# Patient Record
Sex: FEMALE | Race: WHITE | NOT HISPANIC OR LATINO | Employment: OTHER | URBAN - METROPOLITAN AREA
[De-identification: names, ages, dates, MRNs, and addresses within clinical notes are randomized per-mention and may not be internally consistent; named-entity substitution may affect disease eponyms.]

---

## 2017-02-20 ENCOUNTER — LAB (OUTPATIENT)
Dept: LAB | Facility: CLINIC | Age: 82
End: 2017-02-20
Payer: MEDICARE

## 2017-02-20 ENCOUNTER — TRANSCRIBE ORDERS (OUTPATIENT)
Dept: LAB | Facility: CLINIC | Age: 82
End: 2017-02-20

## 2017-02-20 DIAGNOSIS — I48.91 ATRIAL FIBRILLATION, UNSPECIFIED TYPE (HCC): Primary | ICD-10-CM

## 2017-02-20 DIAGNOSIS — I25.110 ATHEROSCLEROSIS OF NATIVE CORONARY ARTERY WITH UNSTABLE ANGINA PECTORIS, UNSPECIFIED WHETHER NATIVE OR TRANSPLANTED HEART (HCC): ICD-10-CM

## 2017-02-20 DIAGNOSIS — I63.9 IMPENDING CEREBROVASCULAR ACCIDENT (HCC): ICD-10-CM

## 2017-02-20 DIAGNOSIS — I50.9 HEART FAILURE, UNSPECIFIED (HCC): ICD-10-CM

## 2017-02-20 DIAGNOSIS — J45.40 ASTHMATIC BRONCHITIS, MODERATE PERSISTENT, UNCOMPLICATED: ICD-10-CM

## 2017-02-20 DIAGNOSIS — I48.91 ATRIAL FIBRILLATION, UNSPECIFIED TYPE (HCC): ICD-10-CM

## 2017-02-20 LAB
ALBUMIN SERPL BCP-MCNC: 3.8 G/DL (ref 3.5–5)
ALP SERPL-CCNC: 116 U/L (ref 46–116)
ALT SERPL W P-5'-P-CCNC: 47 U/L (ref 12–78)
ANION GAP SERPL CALCULATED.3IONS-SCNC: 6 MMOL/L (ref 4–13)
AST SERPL W P-5'-P-CCNC: 41 U/L (ref 5–45)
BASOPHILS # BLD AUTO: 0.03 THOUSANDS/ΜL (ref 0–0.1)
BASOPHILS NFR BLD AUTO: 1 % (ref 0–1)
BILIRUB SERPL-MCNC: 0.9 MG/DL (ref 0.2–1)
BUN SERPL-MCNC: 38 MG/DL (ref 5–25)
CALCIUM SERPL-MCNC: 9.2 MG/DL (ref 8.3–10.1)
CHLORIDE SERPL-SCNC: 105 MMOL/L (ref 100–108)
CO2 SERPL-SCNC: 34 MMOL/L (ref 21–32)
CREAT SERPL-MCNC: 1.44 MG/DL (ref 0.6–1.3)
DIGOXIN SERPL-MCNC: 0.9 NG/ML (ref 0.8–2)
EOSINOPHIL # BLD AUTO: 0.2 THOUSAND/ΜL (ref 0–0.61)
EOSINOPHIL NFR BLD AUTO: 3 % (ref 0–6)
ERYTHROCYTE [DISTWIDTH] IN BLOOD BY AUTOMATED COUNT: 14.9 % (ref 11.6–15.1)
ERYTHROCYTE [SEDIMENTATION RATE] IN BLOOD: 10 MM/HOUR (ref 0–20)
GFR SERPL CREATININE-BSD FRML MDRD: 34.3 ML/MIN/1.73SQ M
GLUCOSE SERPL-MCNC: 89 MG/DL (ref 65–140)
HCT VFR BLD AUTO: 44.1 % (ref 34.8–46.1)
HGB BLD-MCNC: 13.8 G/DL (ref 11.5–15.4)
LDLC SERPL DIRECT ASSAY-MCNC: 81 MG/DL (ref 0–100)
LYMPHOCYTES # BLD AUTO: 2.27 THOUSANDS/ΜL (ref 0.6–4.47)
LYMPHOCYTES NFR BLD AUTO: 35 % (ref 14–44)
MCH RBC QN AUTO: 30.6 PG (ref 26.8–34.3)
MCHC RBC AUTO-ENTMCNC: 31.3 G/DL (ref 31.4–37.4)
MCV RBC AUTO: 98 FL (ref 82–98)
MONOCYTES # BLD AUTO: 0.88 THOUSAND/ΜL (ref 0.17–1.22)
MONOCYTES NFR BLD AUTO: 14 % (ref 4–12)
NEUTROPHILS # BLD AUTO: 3.05 THOUSANDS/ΜL (ref 1.85–7.62)
NEUTS SEG NFR BLD AUTO: 47 % (ref 43–75)
NT-PROBNP SERPL-MCNC: 2516 PG/ML
PLATELET # BLD AUTO: 251 THOUSANDS/UL (ref 149–390)
PMV BLD AUTO: 10.7 FL (ref 8.9–12.7)
POTASSIUM SERPL-SCNC: 3.7 MMOL/L (ref 3.5–5.3)
PROT SERPL-MCNC: 7.5 G/DL (ref 6.4–8.2)
RBC # BLD AUTO: 4.51 MILLION/UL (ref 3.81–5.12)
SODIUM SERPL-SCNC: 145 MMOL/L (ref 136–145)
WBC # BLD AUTO: 6.43 THOUSAND/UL (ref 4.31–10.16)

## 2017-02-20 PROCEDURE — 83721 ASSAY OF BLOOD LIPOPROTEIN: CPT

## 2017-02-20 PROCEDURE — 80162 ASSAY OF DIGOXIN TOTAL: CPT

## 2017-02-20 PROCEDURE — 85652 RBC SED RATE AUTOMATED: CPT

## 2017-02-20 PROCEDURE — 36415 COLL VENOUS BLD VENIPUNCTURE: CPT

## 2017-02-20 PROCEDURE — 80053 COMPREHEN METABOLIC PANEL: CPT

## 2017-02-20 PROCEDURE — 83880 ASSAY OF NATRIURETIC PEPTIDE: CPT

## 2017-02-20 PROCEDURE — 85025 COMPLETE CBC W/AUTO DIFF WBC: CPT

## 2017-03-02 ENCOUNTER — APPOINTMENT (OUTPATIENT)
Dept: LAB | Facility: CLINIC | Age: 82
End: 2017-03-02
Payer: MEDICARE

## 2017-03-02 DIAGNOSIS — J45.40 ASTHMATIC BRONCHITIS, MODERATE PERSISTENT, UNCOMPLICATED: ICD-10-CM

## 2017-03-02 DIAGNOSIS — I63.9 IMPENDING CEREBROVASCULAR ACCIDENT (HCC): ICD-10-CM

## 2017-03-02 DIAGNOSIS — I50.9 HEART FAILURE, UNSPECIFIED (HCC): ICD-10-CM

## 2017-03-02 DIAGNOSIS — I48.91 ATRIAL FIBRILLATION, UNSPECIFIED TYPE (HCC): ICD-10-CM

## 2017-03-02 DIAGNOSIS — I25.110 ATHEROSCLEROSIS OF NATIVE CORONARY ARTERY WITH UNSTABLE ANGINA PECTORIS, UNSPECIFIED WHETHER NATIVE OR TRANSPLANTED HEART (HCC): ICD-10-CM

## 2017-03-02 LAB
ANION GAP SERPL CALCULATED.3IONS-SCNC: 7 MMOL/L (ref 4–13)
BUN SERPL-MCNC: 34 MG/DL (ref 5–25)
CALCIUM SERPL-MCNC: 9.1 MG/DL (ref 8.3–10.1)
CHLORIDE SERPL-SCNC: 104 MMOL/L (ref 100–108)
CO2 SERPL-SCNC: 33 MMOL/L (ref 21–32)
CREAT SERPL-MCNC: 1.23 MG/DL (ref 0.6–1.3)
GFR SERPL CREATININE-BSD FRML MDRD: 41.1 ML/MIN/1.73SQ M
GLUCOSE SERPL-MCNC: 82 MG/DL (ref 65–140)
NT-PROBNP SERPL-MCNC: 1842 PG/ML
POTASSIUM SERPL-SCNC: 3.7 MMOL/L (ref 3.5–5.3)
SODIUM SERPL-SCNC: 144 MMOL/L (ref 136–145)

## 2017-03-02 PROCEDURE — 36415 COLL VENOUS BLD VENIPUNCTURE: CPT

## 2017-03-02 PROCEDURE — 80048 BASIC METABOLIC PNL TOTAL CA: CPT

## 2017-03-02 PROCEDURE — 83880 ASSAY OF NATRIURETIC PEPTIDE: CPT

## 2017-03-10 ENCOUNTER — HOSPITAL ENCOUNTER (OUTPATIENT)
Dept: NON INVASIVE DIAGNOSTICS | Facility: CLINIC | Age: 82
Discharge: HOME | End: 2017-03-10
Payer: MEDICARE

## 2017-03-10 DIAGNOSIS — I48.91 ATRIAL FIBRILLATION (HCC): ICD-10-CM

## 2017-03-10 DIAGNOSIS — I10 ESSENTIAL (PRIMARY) HYPERTENSION: ICD-10-CM

## 2017-03-10 DIAGNOSIS — I35.1 NONRHEUMATIC AORTIC VALVE INSUFFICIENCY: ICD-10-CM

## 2017-03-10 PROCEDURE — 93306 TTE W/DOPPLER COMPLETE: CPT

## 2017-03-28 ENCOUNTER — LAB REQUISITION (OUTPATIENT)
Dept: LAB | Facility: HOSPITAL | Age: 82
End: 2017-03-28
Payer: MEDICARE

## 2017-03-28 DIAGNOSIS — I48.20 CHRONIC ATRIAL FIBRILLATION (HCC): ICD-10-CM

## 2017-03-28 DIAGNOSIS — I50.9 HEART FAILURE (HCC): ICD-10-CM

## 2017-03-28 LAB
ANION GAP SERPL CALCULATED.3IONS-SCNC: 9 MMOL/L (ref 4–13)
BUN SERPL-MCNC: 34 MG/DL (ref 5–25)
CALCIUM SERPL-MCNC: 9.8 MG/DL (ref 8.3–10.1)
CHLORIDE SERPL-SCNC: 103 MMOL/L (ref 100–108)
CO2 SERPL-SCNC: 31 MMOL/L (ref 21–32)
CREAT SERPL-MCNC: 1.15 MG/DL (ref 0.6–1.3)
GFR SERPL CREATININE-BSD FRML MDRD: 44.4 ML/MIN/1.73SQ M
GLUCOSE P FAST SERPL-MCNC: 97 MG/DL (ref 65–99)
MAGNESIUM SERPL-MCNC: 2.2 MG/DL (ref 1.6–2.6)
POTASSIUM SERPL-SCNC: 4.3 MMOL/L (ref 3.5–5.3)
SODIUM SERPL-SCNC: 143 MMOL/L (ref 136–145)

## 2017-03-28 PROCEDURE — 80048 BASIC METABOLIC PNL TOTAL CA: CPT | Performed by: INTERNAL MEDICINE

## 2017-03-28 PROCEDURE — 83735 ASSAY OF MAGNESIUM: CPT | Performed by: INTERNAL MEDICINE

## 2017-05-05 ENCOUNTER — ALLSCRIPTS OFFICE VISIT (OUTPATIENT)
Dept: OTHER | Facility: OTHER | Age: 82
End: 2017-05-05

## 2017-07-10 DIAGNOSIS — R26.89 OTHER ABNORMALITIES OF GAIT AND MOBILITY: ICD-10-CM

## 2017-07-20 ENCOUNTER — GENERIC CONVERSION - ENCOUNTER (OUTPATIENT)
Dept: OTHER | Facility: OTHER | Age: 82
End: 2017-07-20

## 2017-07-20 ENCOUNTER — APPOINTMENT (OUTPATIENT)
Dept: PHYSICAL THERAPY | Facility: CLINIC | Age: 82
End: 2017-07-20
Payer: MEDICARE

## 2017-07-20 DIAGNOSIS — R26.89 OTHER ABNORMALITIES OF GAIT AND MOBILITY: ICD-10-CM

## 2017-07-20 PROCEDURE — 97110 THERAPEUTIC EXERCISES: CPT

## 2017-07-20 PROCEDURE — G8991 OTHER PT/OT GOAL STATUS: HCPCS

## 2017-07-20 PROCEDURE — 97162 PT EVAL MOD COMPLEX 30 MIN: CPT

## 2017-07-20 PROCEDURE — G8990 OTHER PT/OT CURRENT STATUS: HCPCS

## 2017-07-25 ENCOUNTER — APPOINTMENT (OUTPATIENT)
Dept: PHYSICAL THERAPY | Facility: CLINIC | Age: 82
End: 2017-07-25
Payer: MEDICARE

## 2017-07-25 PROCEDURE — 97110 THERAPEUTIC EXERCISES: CPT

## 2017-07-25 PROCEDURE — 97112 NEUROMUSCULAR REEDUCATION: CPT

## 2017-08-01 ENCOUNTER — APPOINTMENT (OUTPATIENT)
Dept: PHYSICAL THERAPY | Facility: CLINIC | Age: 82
End: 2017-08-01
Payer: MEDICARE

## 2017-08-01 PROCEDURE — 97112 NEUROMUSCULAR REEDUCATION: CPT

## 2017-08-01 PROCEDURE — 97110 THERAPEUTIC EXERCISES: CPT

## 2017-08-04 ENCOUNTER — APPOINTMENT (OUTPATIENT)
Dept: PHYSICAL THERAPY | Facility: CLINIC | Age: 82
End: 2017-08-04
Payer: MEDICARE

## 2017-08-04 PROCEDURE — 97112 NEUROMUSCULAR REEDUCATION: CPT

## 2017-08-04 PROCEDURE — 97530 THERAPEUTIC ACTIVITIES: CPT

## 2017-08-08 ENCOUNTER — APPOINTMENT (OUTPATIENT)
Dept: PHYSICAL THERAPY | Facility: CLINIC | Age: 82
End: 2017-08-08
Payer: MEDICARE

## 2017-08-08 PROCEDURE — 97112 NEUROMUSCULAR REEDUCATION: CPT

## 2017-08-08 PROCEDURE — 97110 THERAPEUTIC EXERCISES: CPT

## 2017-08-10 ENCOUNTER — APPOINTMENT (OUTPATIENT)
Dept: PHYSICAL THERAPY | Facility: CLINIC | Age: 82
End: 2017-08-10
Payer: MEDICARE

## 2017-08-10 PROCEDURE — 97110 THERAPEUTIC EXERCISES: CPT

## 2017-08-10 PROCEDURE — 97112 NEUROMUSCULAR REEDUCATION: CPT

## 2017-08-11 ENCOUNTER — APPOINTMENT (OUTPATIENT)
Dept: PHYSICAL THERAPY | Facility: CLINIC | Age: 82
End: 2017-08-11
Payer: MEDICARE

## 2017-08-14 ENCOUNTER — APPOINTMENT (OUTPATIENT)
Dept: PHYSICAL THERAPY | Facility: CLINIC | Age: 82
End: 2017-08-14
Payer: MEDICARE

## 2017-08-17 ENCOUNTER — APPOINTMENT (OUTPATIENT)
Dept: PHYSICAL THERAPY | Facility: CLINIC | Age: 82
End: 2017-08-17
Payer: MEDICARE

## 2017-08-21 ENCOUNTER — APPOINTMENT (OUTPATIENT)
Dept: PHYSICAL THERAPY | Facility: CLINIC | Age: 82
End: 2017-08-21
Payer: MEDICARE

## 2017-08-24 ENCOUNTER — APPOINTMENT (OUTPATIENT)
Dept: PHYSICAL THERAPY | Facility: CLINIC | Age: 82
End: 2017-08-24
Payer: MEDICARE

## 2017-08-28 ENCOUNTER — APPOINTMENT (OUTPATIENT)
Dept: PHYSICAL THERAPY | Facility: CLINIC | Age: 82
End: 2017-08-28
Payer: MEDICARE

## 2017-08-28 ENCOUNTER — GENERIC CONVERSION - ENCOUNTER (OUTPATIENT)
Dept: OTHER | Facility: OTHER | Age: 82
End: 2017-08-28

## 2017-08-28 PROCEDURE — G8991 OTHER PT/OT GOAL STATUS: HCPCS

## 2017-08-28 PROCEDURE — 97112 NEUROMUSCULAR REEDUCATION: CPT

## 2017-08-28 PROCEDURE — 97110 THERAPEUTIC EXERCISES: CPT

## 2017-08-28 PROCEDURE — G8990 OTHER PT/OT CURRENT STATUS: HCPCS

## 2017-09-05 ENCOUNTER — APPOINTMENT (OUTPATIENT)
Dept: PHYSICAL THERAPY | Facility: CLINIC | Age: 82
End: 2017-09-05
Payer: MEDICARE

## 2017-09-07 ENCOUNTER — APPOINTMENT (OUTPATIENT)
Dept: PHYSICAL THERAPY | Facility: CLINIC | Age: 82
End: 2017-09-07
Payer: MEDICARE

## 2017-09-11 ENCOUNTER — APPOINTMENT (OUTPATIENT)
Dept: PHYSICAL THERAPY | Facility: CLINIC | Age: 82
End: 2017-09-11
Payer: MEDICARE

## 2017-09-14 ENCOUNTER — APPOINTMENT (OUTPATIENT)
Dept: PHYSICAL THERAPY | Facility: CLINIC | Age: 82
End: 2017-09-14
Payer: MEDICARE

## 2017-09-18 ENCOUNTER — APPOINTMENT (OUTPATIENT)
Dept: PHYSICAL THERAPY | Facility: CLINIC | Age: 82
End: 2017-09-18
Payer: MEDICARE

## 2017-09-21 ENCOUNTER — APPOINTMENT (OUTPATIENT)
Dept: PHYSICAL THERAPY | Facility: CLINIC | Age: 82
End: 2017-09-21
Payer: MEDICARE

## 2017-09-25 ENCOUNTER — APPOINTMENT (OUTPATIENT)
Dept: PHYSICAL THERAPY | Facility: CLINIC | Age: 82
End: 2017-09-25
Payer: MEDICARE

## 2017-09-25 PROCEDURE — G8990 OTHER PT/OT CURRENT STATUS: HCPCS

## 2017-09-25 PROCEDURE — 97110 THERAPEUTIC EXERCISES: CPT

## 2017-09-25 PROCEDURE — G8991 OTHER PT/OT GOAL STATUS: HCPCS

## 2017-09-28 ENCOUNTER — APPOINTMENT (OUTPATIENT)
Dept: PHYSICAL THERAPY | Facility: CLINIC | Age: 82
End: 2017-09-28
Payer: MEDICARE

## 2017-10-03 ENCOUNTER — LAB REQUISITION (OUTPATIENT)
Dept: LAB | Facility: HOSPITAL | Age: 82
End: 2017-10-03
Payer: MEDICARE

## 2017-10-03 DIAGNOSIS — I48.20 CHRONIC ATRIAL FIBRILLATION (HCC): ICD-10-CM

## 2017-10-03 DIAGNOSIS — I50.9 HEART FAILURE (HCC): ICD-10-CM

## 2017-10-03 DIAGNOSIS — R53.83 OTHER FATIGUE: ICD-10-CM

## 2017-10-03 LAB
25(OH)D3 SERPL-MCNC: 48.2 NG/ML (ref 30–100)
ALBUMIN SERPL BCP-MCNC: 3.8 G/DL (ref 3.5–5)
ALP SERPL-CCNC: 110 U/L (ref 46–116)
ALT SERPL W P-5'-P-CCNC: 33 U/L (ref 12–78)
ANION GAP SERPL CALCULATED.3IONS-SCNC: 8 MMOL/L (ref 4–13)
AST SERPL W P-5'-P-CCNC: 38 U/L (ref 5–45)
BILIRUB SERPL-MCNC: 0.51 MG/DL (ref 0.2–1)
BUN SERPL-MCNC: 29 MG/DL (ref 5–25)
CALCIUM SERPL-MCNC: 9.2 MG/DL (ref 8.3–10.1)
CHLORIDE SERPL-SCNC: 105 MMOL/L (ref 100–108)
CO2 SERPL-SCNC: 29 MMOL/L (ref 21–32)
CREAT SERPL-MCNC: 1.05 MG/DL (ref 0.6–1.3)
ERYTHROCYTE [DISTWIDTH] IN BLOOD BY AUTOMATED COUNT: 14.8 % (ref 11.6–15.1)
GFR SERPL CREATININE-BSD FRML MDRD: 47 ML/MIN/1.73SQ M
GLUCOSE SERPL-MCNC: 101 MG/DL (ref 65–140)
HCT VFR BLD AUTO: 42.5 % (ref 34.8–46.1)
HGB BLD-MCNC: 14.2 G/DL (ref 11.5–15.4)
MAGNESIUM SERPL-MCNC: 2.4 MG/DL (ref 1.6–2.6)
MCH RBC QN AUTO: 33 PG (ref 26.8–34.3)
MCHC RBC AUTO-ENTMCNC: 33.4 G/DL (ref 31.4–37.4)
MCV RBC AUTO: 99 FL (ref 82–98)
PLATELET # BLD AUTO: 264 THOUSANDS/UL (ref 149–390)
PMV BLD AUTO: 11.2 FL (ref 8.9–12.7)
POTASSIUM SERPL-SCNC: 4.2 MMOL/L (ref 3.5–5.3)
PROT SERPL-MCNC: 7.3 G/DL (ref 6.4–8.2)
RBC # BLD AUTO: 4.3 MILLION/UL (ref 3.81–5.12)
SODIUM SERPL-SCNC: 142 MMOL/L (ref 136–145)
TSH SERPL DL<=0.05 MIU/L-ACNC: 2.9 UIU/ML (ref 0.36–3.74)
WBC # BLD AUTO: 7.51 THOUSAND/UL (ref 4.31–10.16)

## 2017-10-03 PROCEDURE — 80053 COMPREHEN METABOLIC PANEL: CPT | Performed by: INTERNAL MEDICINE

## 2017-10-03 PROCEDURE — 85027 COMPLETE CBC AUTOMATED: CPT | Performed by: INTERNAL MEDICINE

## 2017-10-03 PROCEDURE — 82306 VITAMIN D 25 HYDROXY: CPT | Performed by: INTERNAL MEDICINE

## 2017-10-03 PROCEDURE — 83735 ASSAY OF MAGNESIUM: CPT | Performed by: INTERNAL MEDICINE

## 2017-10-03 PROCEDURE — 84443 ASSAY THYROID STIM HORMONE: CPT | Performed by: INTERNAL MEDICINE

## 2017-11-27 ENCOUNTER — ALLSCRIPTS OFFICE VISIT (OUTPATIENT)
Dept: OTHER | Facility: OTHER | Age: 82
End: 2017-11-27

## 2017-11-28 ENCOUNTER — GENERIC CONVERSION - ENCOUNTER (OUTPATIENT)
Dept: OTHER | Facility: OTHER | Age: 82
End: 2017-11-28

## 2017-11-28 NOTE — PROGRESS NOTES
Assessment  Assessed    1  Atrial fibrillation (427 31) (I48 91)   2  Hypertension (401 9) (I10)   3  Nonrheumatic aortic valve insufficiency (424 1) (I35 1)    Plan  Atrial fibrillation    · EKG/ECG- POC; Status:Complete;   Done: 55KUU6627 10:45AM   Perform: In Office; Last Updated Kalyan Roque; 11/27/2017 10:45:41 AM;Ordered;fibrillation; Ordered By:Erick Lake; Atrial fibrillation, Hypertension, Nonrheumatic aortic valve insufficiency    · Follow-up visit in 6 months Evaluation and Treatment  Follow-up  Status: Hold For -Scheduling  Requested for: 27Nov2017   Ordered;Atrial fibrillation, Hypertension, Nonrheumatic aortic valve insufficiency; Ordered By: Herman Rater Performed:  Due: 81PUR1916    Discussion/Summary  Cardiology Discussion Summary Free Text Note Form [facility]:   I will continue her present medical regimen  She appears well compensated  I've asked her to call if there is a problem in the interim otherwise I will see her again in six months time  At her next visit I will likely schedule follow-up echocardiogram in reference to her aortic valve disease  Chief Complaint  Chief Complaint Free Text Note Form: 6 month F/U with EKG      History of Present Illness  Cardiology HPI Free Text Note Form [facility]: Patient is here for a follow-up visit  She was last seen by me in May 2017  Since that time she has been well  She has had no chest pain or significant dyspnea  Her blood pressure is up a little bit today but in general she tells me it has been well controlled  She does have a white coat component  Her EKG today demonstrates atrial fibrillation with a controlled ventricular response with voltage for LVH  There was no change compared to a prior EKG done November 2016   Aortic Regurgitation (Brief): The patient is being seen for a routine clinic follow-up of aortic valve regurgitation  The patient is currently asymptomatic  Atrial Fibrillation (Follow-Up):  The patient presents with permanent atrial fibrillation  The treatment strategy for this patient is rate control  She states her atrial fibrillation has been well controlled since the last visit  Symptoms:   Hypertension (Follow-Up): The patient presents for follow-up of essential hypertension  The patient states she has been doing well with her blood pressure control since the last visit  Symptoms:      Review of Systems  Cardiology Female ROS:    Cardiac: No complaints of chest pain, no palpitations, no fainting  Skin: No complaints of nonhealing sores or skin rash  Genitourinary: No complaints of recurrent urinary tract infections, frequent urination at night, difficult urination, blood in urine, kidney stones, loss of bladder control, kidney problems, denies any birth control or hormone replacement, is not post menopausal, not currently pregnant  Psychological: No complaints of feeling depressed, anxiety, panic attacks, or difficulty concentrating  General: No complaints of trouble sleeping, lack of energy, fatigue, appetite changes, weight changes, fever, frequent infections, or night sweats  Respiratory: No complaints of shortness of breath, cough with sputum, or wheezing  HEENT: No complaints of serious problems, hearing problems, nose problems, throat problems, or snoring  Gastrointestinal: No complaints of liver problems, nausea, vomiting, heartburn, constipation, bloody stools, diarrhea, problems swallowing, adbominal pain, or rectal bleeding  Hematologic: No complaints of bleeding disorders, anemia, blood clots, or excessive brusing  Neurological: No complaints of numbness, tingling, dizziness, weakness, seizures, headaches, syncope or fainting, AM fatigue, daytime sleepiness, no witnessed apnea episodes  -- Memory difficulty  Musculoskeletal: No complaints of arthritis, back pain, or painfull swelling  Active Problems  Problems    1  Abnormal blood sugar (790 29) (R73 09)   2  Anxiety (300 00) (F41 9)   3  Atrial fibrillation (427 31) (I48 91)   4  Balance problem (781 99) (R26 89)   5  Cerebrovascular accident, 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)   6  Concussion without loss of consciousness (850 0) (S06 0X0A)   7  Dementia without behavioral disturbance (294 20) (F03 90)   8  Flashing lights seen (368 8) (H53 8)   9  Heart disorder (429 9) (I51 9)   10  History of hypertension (V12 59) (Z86 79)   11  Hypertension (401 9) (I10)   12  Localized Primary Osteoarthritis Of Right Thumb (715 14)   13  Low vitamin D level (268 9) (E55 9)   14  Mild cognitive impairment (331 83) (G31 84)   15  Nonrheumatic aortic valve insufficiency (424 1) (I35 1)   16  Primary osteoarthritis of right shoulder (715 11) (M19 011)   17  Vascular dementia without behavioral disturbance (290 40) (F01 50)   18  Visual disturbances (368 9) (H53 9)    Past Medical History  Problems    1  History of Arthritis (V13 4)   2  History of cardiac disorder (V12 50) (Z86 79)   3  History of hypertension (V12 59) (Z86 79)  Active Problems And Past Medical History Reviewed: The active problems and past medical history were reviewed and updated today  Surgical History  Problems    1  History of Cath Stent Placement   2  History of Previous Stent Placement   3  History of Thyroid Surgery    Family History  Mother    1  No pertinent family history  Family History    2  Family history of Arthritis (V17 7)   3  Family history of Lung Cancer (V16 1)    Social History  Problems    · Daily Coffee Consumption (___ Cups/Day)   · Never A Smoker    Current Meds   1  Caltrate 600 TABS; Therapy: (Recorded:38Plg2367) to Recorded   2  Coenzyme Q10 100 MG Oral Capsule; TAKE CAPSULE Daily; Therapy: (Recorded:23Nov2015) to Recorded   3  Digoxin 125 MCG Oral Tablet; Take 1 tablet daily; Therapy: 82HQL2686 to (Last Rx:09Sep2017)  Requested for: 09Sep2017 Ordered   4  Donepezil HCl - 10 MG Oral Tablet; TAKE 1 TABLET DAILY;  Therapy: 37NVD6126 to (Evaluate:04Nov2017)  Requested for: 07KUD2669; Last Rx:09Nov2016 Ordered   5  Eliquis 2 5 MG Oral Tablet; take 1 tablet twice a day; Therapy: 88BWW8175 to (Last Rx:74Phh6459)  Requested for: 29Jtr2054 Ordered   6  Furosemide 40 MG Oral Tablet; TAKE 1 TABLET DAILY; Therapy: (Recorded:23Nov2015) to Recorded   7  Imdur 30 MG TB24; TAKE 1 TABLET DAILY; Therapy: (Recorded:23Nov2015) to Recorded   8  Ipratropium Bromide 0 06 % Nasal Solution; USE 2 SPRAYS IN EACH NOSTRIL 2 TO 3 TIMES DAILY; Therapy: 48TTB2914 to Recorded   9  Lipitor 20 MG Oral Tablet; TAKE 1 TABLET DAILY; Therapy: (Recorded:23Nov2015) to Recorded   10  Magnesium 200 MG Oral Tablet; Take 1 tablet daily; Therapy: (Recorded:10Nov2016) to Recorded   11  Metoprolol Tartrate 25 MG Oral Tablet; TAKE 1 TABLET AT NOON AND 2 TABLETS ( 50 MG  )  IN THE EVENING; Therapy: 89OVG8374 to (Last Rx:99Yrw0806)  Requested for: 28Dec2015 Ordered   12  Mirtazapine 15 MG Oral Tablet; Take 1 tablet daily; Therapy: (Recorded:05May2017) to Recorded   13  Multivitamins CAPS; Therapy: (Recorded:18May2015) to Recorded   14  Pantoprazole Sodium 40 MG Oral Tablet Delayed Release; TAKE 1 TABLET DAILY; Therapy: (Recorded:23Nov2015) to Recorded  Medication List Reviewed: The medication list was reviewed and updated today  Allergies  Medication    1  Aspirin TABS   2  Thiazide Diuretics  Non-Medication    3  IV Dye    Vitals  Vital Signs    Recorded: 23SFY9673 11:06AM   Heart Rate 84, Apical   Pulse Quality Irregular, Apical   Respiration 16   Systolic 829, RUE, Sitting   Diastolic 74, RUE, Sitting   Height 5 ft 2 in   Weight 111 lb 3 oz   BMI Calculated 20 34   BSA Calculated 1 49       Physical Exam   Constitutional  General appearance: No acute distress, well appearing and well nourished  Eyes  Conjunctiva and Sclera examination: Conjunctiva pink, sclera anicteric     Ears, Nose, Mouth, and Throat - Oropharynx: Clear, nares are clear, mucous membranes are moist   Neck  Neck and thyroid: Normal, supple, trachea midline, no thyromegaly  Pulmonary  Respiratory effort: No increased work of breathing or signs of respiratory distress  Auscultation of lungs: Clear to auscultation, no rales, no rhonchi, no wheezing, good air movement  Cardiovascular  Palpation of heart: Normal PMI, no thrills  Auscultation of heart: Normal rate and rhythm, normal S1 and S2, no murmurs  Carotid pulses: Normal, 2+ bilaterally  Pedal pulses: Normal, 2+ bilaterally  Examination of extremities for edema and/or varicosities: Normal    Chest - Chest: Normal   Musculoskeletal Gait and station: Normal gait  -- Digits and nails: Normal without clubbing or cyanosis  Skin - Skin and subcutaneous tissue: Normal without rashes or lesions  Skin is warm and well perfused, normal turgor  Neurologic - Speech: Normal    Psychiatric - Orientation to person, place, and time: Normal -- Mood and affect: Normal       Results/Data  ECG Report: Atrial fibrillation with LVH by voltage and nonspecific ST segment changes with no significant change compared to a prior study done November 2016      Future Appointments    Date/Time Provider Specialty Site   12/07/2017 10:00 AM CUONG Lamb  [address] [facility]    04/30/2018 10:00 AM CUONG Gardner   Family Medicine [address] [address]       Signatures   Electronically signed by : CUONG Cagle ; Nov 27 2017 11:31AM EST                       (Author)

## 2017-12-08 ENCOUNTER — ALLSCRIPTS OFFICE VISIT (OUTPATIENT)
Dept: OTHER | Facility: OTHER | Age: 82
End: 2017-12-08

## 2017-12-08 DIAGNOSIS — F03.90 DEMENTIA WITHOUT BEHAVIORAL DISTURBANCE (HCC): ICD-10-CM

## 2017-12-09 NOTE — PROGRESS NOTES
Assessment    1  Cerebrovascular accident, embolic (576 17) (Y17 9)   2  Dementia without behavioral disturbance (294 20) (F03 90)   3  Restless legs syndrome (333 94) (G25 81)    Discussion/Summary  Discussion Summary:   1) Dementia - likely vascular type, MOCA today 20/30 (previously 11/16 25/30, 6/16 20/30); reviewed MRI from 2015, acute infarct, global atrophy, not consistent with Alzheimer's; at this time no need for repeat head imaging  Will check B12 and Folate levels, advised she return to clinic for [facility] testing  TSH recently done wnl  Continue with Donepezil 10 mg at bedtime, tolerating well  Pt remains very functional   Lower ext cramping - likely 2/2 restless leg syndrome, will do trial of Requip at bedtime, 0 5 mg at bedtime for 2 weeks, if tolerating well increase to 2 tablets at bedtime (1mg); pt is currently on Furosemide 40 mg daily for lower ext edema, EF on recent ECHO 60%, advised monitoring of electrolytes and potassium levels  Allergies - recommend Flonase nasal spray and Loratadine 10 mg daily  Continue to monitor  On inhaler as prescribed by Pulmonology  Polypharmacy- medication list reviewed, followed by cardiology for A  fib on Eliquis, digoxin, furosemide, Imdur, metoprolol; she is allergic to ASA  Also followed by Neurology for CVA, on statin for CVA prevention, taking Coenzyme q 10  Medications appear to be appropriate at this time  Will discuss use of vitamins and supplements at family conference  in 2-3 weeks for family conference  Patient seen and examined with gerkrystalirc fellow, Dr Yoli Guthrie  I have reviewed the note and I am agreement in findings and plan  In addition to above I recommend PT evaluation and treatment  Counseling Documentation With Imm: The patient, patient's family was counseled regarding instructions for management,-- risk factor reductions,-- patient and family education,-- impressions,-- importance of compliance with treatment   total time of encounter was 60 minutes-- and-- 35 minutes was spent counseling  Chief Complaint  Chief Complaint Free Text Note Form: The patient is here for a geriatric evaluation with balance issues, falls, and cognitive decline of concern      History of Present Illness  HPI: Pt here with her grandson and  for evaluation of memory concerns  with grandson and  - leg cramps worse at night cant sleep, happening over a year  Tried molasses and topical cream, Co-enzyme Q10  Has allergies, uses nasal spray for that  Shes pulmonology, gets short of breath with short distances cant catch her breath  Heart doctor is Jacquie Hoff, for irregular heart beat  Less exercise tolerance  Memory not to bad for her age, fell 3 yrs ago, hit her head and since that fall had some peculiar behavior  Started on Donepezil 10 mg daily  Thinks she is on a lot of medications  Enjoys doing physical therapy, short term PT previously; if she doesnât do PT she remains sedentary at home  Last fall 2014  Has one bad knee  They have grab bars at home   cant pick her up if she falls  2015 findings: 1  Tiny acute cortical infarct high left parietal lobe  No hemorrhage or mass effect  Extensive advanced generalized volume loss consistent with global neuro degenerative process  Alzheimer's type dementia cannot be supported based upon this exam   with Ms Librado Mitchell - hard of hearing, grandson helps her a lot, set up caregiver to be with them, has a   Grandson wants them to move to [facility]  She lives in [facility]  States she is using a nasal spray for her allergies, Atrovent, has excessively runny nose  Thinks the care taker is a little overwhelming, she would rather she not be there  Has a rub to use on her legs, states it is helpful, she isn't taking any medication for her leg pain   She is independent for ADLs, still pays her own bills, they care of their finances, no mistakes, have not forgotten to pay any bills, no late charges  She has her niece come over to help with organizing her pill box  Caretaker does the grocery shopping, not out of necessity but because it has been tasked to the caretaker  Caretaker also does the laundry but the pt is concerned about it because she enjoys doing it   list: Digoxin, Metoprolol, Eliquis, Pantoprazole, Magnesium, Isosorbide, ipratropium, eye gtts, Lipitor, donepezil, chewable calcium, one a day multivitamin  Allergic to contrast, ASA, Sulfa  Review of Systems  Complete-Female:  Constitutional: no fever,-- no chills-- and-- no fatigue  Eyes: no eye pain-- and-- eyes not red  ENT: hearing loss,-- nasal congestion-- and-- nasal discharge, but-- no earache,-- no sneezing,-- no sore throat-- and-- no hoarseness  Cardiovascular: no chest pain,-- the heart is not racing-- and-- no lower extremity edema  Respiratory: no shortness of breath,-- no cough-- and-- no productive cough  Gastrointestinal: no abdominal pain,-- no nausea,-- no vomiting,-- no diarrhea-- and-- no constipation  Musculoskeletal: diffuse joint pain  Integumentary and Breasts: no rashes-- and-- no edema  Neurological: memory loss-- and-- bilateral lower ext cramping, but-- no headache,-- no confusion,-- no dizziness,-- no fainting,-- no paresthesias,-- no saddle paresthesia,-- no leg numbness,-- no leg weakness,-- no difficulty walking,-- no delusions-- and-- no hallucinations--   no falls  Psychiatric: no insomnia,-- no anxiety-- and-- no depression  Endocrine: no muscle weakness-- and-- no generalized weakness  Hematologic and Lymphatic: no tendency for easy bleeding-- and-- no tendency for easy bruising  Geriatric Syndrome Elly Lockharton:  the memory was impaired  --   has no depression  --   no falls  --   no recent weight loss  --   vision impairment  --   hearing impairment  --   gait is abnormal --   with no delirium  --   polypharmacy  Active Problems  1  Abnormal blood sugar (790 29) (R73 09)   2   Anxiety (300 00) (F41 9)   3  Atrial fibrillation (427 31) (I48 91)   4  Balance problem (781 99) (R26 89)   5  Cerebrovascular accident, embolic (259 91) (O58 2)   6  Concussion without loss of consciousness (850 0) (S06 0X0A)   7  Dementia without behavioral disturbance (294 20) (F03 90)   8  Flashing lights seen (368 8) (H53 8)   9  Heart disorder (429 9) (I51 9)   10  History of hypertension (V12 59) (Z86 79)   11  Hypertension (401 9) (I10)   12  Localized Primary Osteoarthritis Of Right Thumb (715 14)   13  Low vitamin D level (268 9) (E55 9)   14  Mild cognitive impairment (331 83) (G31 84)   15  Nonrheumatic aortic valve insufficiency (424 1) (I35 1)   16  Primary osteoarthritis of right shoulder (715 11) (M19 011)   17  Vascular dementia without behavioral disturbance (290 40) (F01 50)   18  Visual disturbances (368 9) (H53 9)    Past Medical History  1  History of Arthritis (V13 4)   2  History of cardiac disorder (V12 50) (Z86 79)   3  History of hypertension (V12 59) (Z86 79)    Surgical History  1  History of Cath Stent Placement   2  History of Previous Stent Placement   3  History of Thyroid Surgery    Family History  Mother    1  No pertinent family history  Family History    2  Family history of Arthritis (V17 7)   3  Family history of Lung Cancer (V16 1)    Social History     · Daily Coffee Consumption (___ Cups/Day)   · Never A Smoker    Current Meds   1  Caltrate 600 TABS; Therapy: (Recorded:31Seb8864) to Recorded   2  Coenzyme Q10 100 MG Oral Capsule; TAKE CAPSULE Daily; Therapy: (Recorded:23Nov2015) to Recorded   3  Digoxin 125 MCG Oral Tablet; Take 1 tablet daily; Therapy: 90SCU4330 to (Last Rx:09Sep2017)  Requested for: 09Sep2017 Ordered   4  Donepezil HCl - 10 MG Oral Tablet; TAKE 1 TABLET DAILY; Therapy: 34TLV5453 to (Uzair Person)  Requested for: 32OFA7419; Last Rx:09Nov2016 Ordered   5  Eliquis 2 5 MG Oral Tablet; take 1 tablet twice a day;  Therapy: 07DZS3410 to (Last Rx:09Sep2017)  Requested for: 54GKK3601 Ordered   6  Furosemide 40 MG Oral Tablet; TAKE 1 TABLET DAILY; Therapy: (Recorded:23Nov2015) to Recorded   7  Imdur 30 MG TB24; TAKE 1 TABLET DAILY; Therapy: (Recorded:23Nov2015) to Recorded   8  Ipratropium Bromide 0 06 % Nasal Solution; USE 2 SPRAYS IN EACH NOSTRIL 2 TO 3 TIMES DAILY; Therapy: 16RVN3974 to Recorded   9  Lipitor 20 MG Oral Tablet; TAKE 1 TABLET DAILY; Therapy: (Recorded:23Nov2015) to Recorded   10  Magnesium 200 MG Oral Tablet; Take 1 tablet daily; Therapy: (Recorded:10Nov2016) to Recorded   11  Metoprolol Tartrate 25 MG Oral Tablet; TAKE 1 TABLET AT NOON AND 2 TABLETS ( 50 MG  )  IN THE EVENING; Therapy: 80IPB2147 to (Last Rx:25Tpm1555)  Requested for: 28Dec2015 Ordered   12  Mirtazapine 15 MG Oral Tablet; Take 1 tablet daily; Therapy: (Recorded:93Atm6550) to Recorded   13  Multivitamins CAPS; Therapy: (Recorded:18May2015) to Recorded   14  Pantoprazole Sodium 40 MG Oral Tablet Delayed Release; TAKE 1 TABLET DAILY; Therapy: (Recorded:23Nov2015) to Recorded    Allergies  1  Aspirin TABS   2  Thiazide Diuretics    3  IV Dye    Vitals  Signs   Recorded: 10ZWD5451 30:81BP   Systolic: 503, LUE, Standing  Diastolic: 70, LUE, Standing  Recorded: 74OVG6680 01:27PM   Heart Rate: 78, R Radial  Respiration: 8  Systolic: 197, LUE, Sitting  Diastolic: 60, LUE, Sitting  Height: 5 ft   Weight: 111 lb 0 5 oz  BMI Calculated: 21 68  BSA Calculated: 1 45    Physical Exam   Constitutional  General appearance: No acute distress, well appearing and well nourished  Head and Face  Head and face: Normal    Eyes  Conjunctiva and lids: No erythema, swelling or discharge  Pupils and irises: Equal, round, reactive to light  -- wears glasses  --   EOM: normal   Ears, Nose, Mouth, and Throat  External inspection of ears and nose: Normal    Palpation of the face and sinuses: No sinus tenderness  Conversational Hearing: Abnormal  -- rhinorrhea  -- moist oral mucosa    Pulmonary  Respiratory effort: No increased work of breathing or signs of respiratory distress  Auscultation of lungs: Clear to auscultation  Cardiovascular irregular, normal rate  Examination of extremities for edema and/or varicosities: Normal    Musculoskeletal  Gait and station: Normal  -- TUGS 10 seconds  -- arthritic changes  -- arthritic changes, kyphosis  Range of motion: Normal    Stability: Normal    Skin age related skin changes  Neurologic  Cranial nerves: Cranial nerves 2-12 intact  Reflexes: 2+ and symmetric  Sensation: No sensory loss  Psychiatric  Orientation to person, place and time: Normal  -- MOCA 20/30  Mood and affect: Normal        Future Appointments    Date/Time Provider Specialty Site   04/30/2018 10:00 AM CUONG Parada   Family Medicine [address] [facility] [place]   12/20/2017 10:00  [address] [place] [address], Nurse Schedule  [address] [facility] OFFICE       Signatures   Electronically signed by : Anuj Beckett DO; Dec  8 2017  4:25PM EST                       (Author)

## 2017-12-13 ENCOUNTER — APPOINTMENT (OUTPATIENT)
Dept: LAB | Facility: CLINIC | Age: 82
End: 2017-12-13
Payer: MEDICARE

## 2017-12-13 DIAGNOSIS — F03.90 DEMENTIA WITHOUT BEHAVIORAL DISTURBANCE (HCC): ICD-10-CM

## 2017-12-13 LAB
FOLATE SERPL-MCNC: >20 NG/ML (ref 3.1–17.5)
VIT B12 SERPL-MCNC: 1123 PG/ML (ref 100–900)

## 2017-12-13 PROCEDURE — 36415 COLL VENOUS BLD VENIPUNCTURE: CPT

## 2017-12-13 PROCEDURE — 82746 ASSAY OF FOLIC ACID SERUM: CPT

## 2017-12-13 PROCEDURE — 82607 VITAMIN B-12: CPT

## 2017-12-31 ENCOUNTER — HOSPITAL ENCOUNTER (INPATIENT)
Facility: HOSPITAL | Age: 82
LOS: 4 days | Discharge: NURSING, REHAB OR LONG-TERM CARE | DRG: 194 | End: 2018-01-04
Attending: EMERGENCY MEDICINE | Admitting: FAMILY MEDICINE
Payer: MEDICARE

## 2017-12-31 ENCOUNTER — APPOINTMENT (EMERGENCY)
Dept: RADIOLOGY | Facility: HOSPITAL | Age: 82
DRG: 194 | End: 2017-12-31
Payer: MEDICARE

## 2017-12-31 DIAGNOSIS — B97.89 VIRAL RESPIRATORY ILLNESS: Primary | ICD-10-CM

## 2017-12-31 DIAGNOSIS — J44.1 COPD EXACERBATION (HCC): ICD-10-CM

## 2017-12-31 DIAGNOSIS — J98.8 VIRAL RESPIRATORY ILLNESS: Primary | ICD-10-CM

## 2017-12-31 PROBLEM — E78.5 HYPERLIPIDEMIA: Status: ACTIVE | Noted: 2017-12-31

## 2017-12-31 PROBLEM — I48.20 CHRONIC ATRIAL FIBRILLATION (HCC): Status: ACTIVE | Noted: 2017-12-31

## 2017-12-31 PROBLEM — I10 ESSENTIAL HYPERTENSION: Status: ACTIVE | Noted: 2017-12-31

## 2017-12-31 LAB
ALBUMIN SERPL BCP-MCNC: 3.7 G/DL (ref 3.5–5)
ALP SERPL-CCNC: 104 U/L (ref 46–116)
ALT SERPL W P-5'-P-CCNC: 48 U/L (ref 12–78)
ANION GAP SERPL CALCULATED.3IONS-SCNC: 10 MMOL/L (ref 4–13)
APTT PPP: 30 SECONDS (ref 23–35)
AST SERPL W P-5'-P-CCNC: 48 U/L (ref 5–45)
ATRIAL RATE: 300 BPM
BASOPHILS # BLD AUTO: 0.03 THOUSANDS/ΜL (ref 0–0.1)
BASOPHILS NFR BLD AUTO: 0 % (ref 0–1)
BILIRUB SERPL-MCNC: 1 MG/DL (ref 0.2–1)
BUN SERPL-MCNC: 23 MG/DL (ref 5–25)
CALCIUM SERPL-MCNC: 9 MG/DL (ref 8.3–10.1)
CHLORIDE SERPL-SCNC: 100 MMOL/L (ref 100–108)
CO2 SERPL-SCNC: 29 MMOL/L (ref 21–32)
CREAT SERPL-MCNC: 1.03 MG/DL (ref 0.6–1.3)
DIGOXIN SERPL-MCNC: 1.4 NG/ML (ref 0.8–2)
EOSINOPHIL # BLD AUTO: 0.03 THOUSAND/ΜL (ref 0–0.61)
EOSINOPHIL NFR BLD AUTO: 0 % (ref 0–6)
ERYTHROCYTE [DISTWIDTH] IN BLOOD BY AUTOMATED COUNT: 14.5 % (ref 11.6–15.1)
GFR SERPL CREATININE-BSD FRML MDRD: 48 ML/MIN/1.73SQ M
GLUCOSE SERPL-MCNC: 120 MG/DL (ref 65–140)
HCT VFR BLD AUTO: 44.3 % (ref 34.8–46.1)
HGB BLD-MCNC: 14.3 G/DL (ref 11.5–15.4)
INR PPP: 1.04 (ref 0.86–1.16)
LYMPHOCYTES # BLD AUTO: 1.49 THOUSANDS/ΜL (ref 0.6–4.47)
LYMPHOCYTES NFR BLD AUTO: 19 % (ref 14–44)
MCH RBC QN AUTO: 31.7 PG (ref 26.8–34.3)
MCHC RBC AUTO-ENTMCNC: 32.3 G/DL (ref 31.4–37.4)
MCV RBC AUTO: 98 FL (ref 82–98)
MONOCYTES # BLD AUTO: 0.89 THOUSAND/ΜL (ref 0.17–1.22)
MONOCYTES NFR BLD AUTO: 12 % (ref 4–12)
NEUTROPHILS # BLD AUTO: 5.26 THOUSANDS/ΜL (ref 1.85–7.62)
NEUTS SEG NFR BLD AUTO: 69 % (ref 43–75)
PLATELET # BLD AUTO: 258 THOUSANDS/UL (ref 149–390)
PMV BLD AUTO: 10.9 FL (ref 8.9–12.7)
POTASSIUM SERPL-SCNC: 4 MMOL/L (ref 3.5–5.3)
PROT SERPL-MCNC: 7.6 G/DL (ref 6.4–8.2)
PROTHROMBIN TIME: 13.9 SECONDS (ref 12.1–14.4)
QRS AXIS: -15 DEGREES
QRSD INTERVAL: 90 MS
QT INTERVAL: 396 MS
QTC INTERVAL: 408 MS
RBC # BLD AUTO: 4.51 MILLION/UL (ref 3.81–5.12)
SODIUM SERPL-SCNC: 139 MMOL/L (ref 136–145)
T WAVE AXIS: 11 DEGREES
TROPONIN I SERPL-MCNC: 0.02 NG/ML
VENTRICULAR RATE: 64 BPM
WBC # BLD AUTO: 7.7 THOUSAND/UL (ref 4.31–10.16)

## 2017-12-31 PROCEDURE — 94760 N-INVAS EAR/PLS OXIMETRY 1: CPT

## 2017-12-31 PROCEDURE — 87798 DETECT AGENT NOS DNA AMP: CPT | Performed by: EMERGENCY MEDICINE

## 2017-12-31 PROCEDURE — 93005 ELECTROCARDIOGRAM TRACING: CPT | Performed by: EMERGENCY MEDICINE

## 2017-12-31 PROCEDURE — 94644 CONT INHLJ TX 1ST HOUR: CPT

## 2017-12-31 PROCEDURE — 85610 PROTHROMBIN TIME: CPT | Performed by: EMERGENCY MEDICINE

## 2017-12-31 PROCEDURE — 85730 THROMBOPLASTIN TIME PARTIAL: CPT | Performed by: EMERGENCY MEDICINE

## 2017-12-31 PROCEDURE — 94150 VITAL CAPACITY TEST: CPT

## 2017-12-31 PROCEDURE — 85025 COMPLETE CBC W/AUTO DIFF WBC: CPT | Performed by: EMERGENCY MEDICINE

## 2017-12-31 PROCEDURE — 93005 ELECTROCARDIOGRAM TRACING: CPT

## 2017-12-31 PROCEDURE — 80162 ASSAY OF DIGOXIN TOTAL: CPT | Performed by: EMERGENCY MEDICINE

## 2017-12-31 PROCEDURE — 80053 COMPREHEN METABOLIC PANEL: CPT | Performed by: EMERGENCY MEDICINE

## 2017-12-31 PROCEDURE — 36415 COLL VENOUS BLD VENIPUNCTURE: CPT | Performed by: EMERGENCY MEDICINE

## 2017-12-31 PROCEDURE — 84484 ASSAY OF TROPONIN QUANT: CPT | Performed by: EMERGENCY MEDICINE

## 2017-12-31 PROCEDURE — 71020 HB X-RAY EXAM CHEST 2 VIEWS: CPT

## 2017-12-31 PROCEDURE — 99285 EMERGENCY DEPT VISIT HI MDM: CPT

## 2017-12-31 PROCEDURE — 94664 DEMO&/EVAL PT USE INHALER: CPT

## 2017-12-31 PROCEDURE — 87633 RESP VIRUS 12-25 TARGETS: CPT | Performed by: FAMILY MEDICINE

## 2017-12-31 PROCEDURE — 94640 AIRWAY INHALATION TREATMENT: CPT

## 2017-12-31 RX ORDER — METOPROLOL TARTRATE 50 MG/1
50 TABLET, FILM COATED ORAL EVERY EVENING
Status: DISCONTINUED | OUTPATIENT
Start: 2017-12-31 | End: 2018-01-04 | Stop reason: HOSPADM

## 2017-12-31 RX ORDER — METHYLPREDNISOLONE SODIUM SUCCINATE 40 MG/ML
40 INJECTION, POWDER, LYOPHILIZED, FOR SOLUTION INTRAMUSCULAR; INTRAVENOUS EVERY 8 HOURS
Status: DISCONTINUED | OUTPATIENT
Start: 2017-12-31 | End: 2018-01-02

## 2017-12-31 RX ORDER — ISOSORBIDE DINITRATE 30 MG/1
30 TABLET ORAL DAILY
COMMUNITY
End: 2020-01-01 | Stop reason: ALTCHOICE

## 2017-12-31 RX ORDER — PANTOPRAZOLE SODIUM 40 MG/1
40 TABLET, DELAYED RELEASE ORAL
Status: DISCONTINUED | OUTPATIENT
Start: 2018-01-01 | End: 2018-01-04 | Stop reason: HOSPADM

## 2017-12-31 RX ORDER — DONEPEZIL HYDROCHLORIDE 5 MG/1
10 TABLET, FILM COATED ORAL
Status: DISCONTINUED | OUTPATIENT
Start: 2017-12-31 | End: 2018-01-04 | Stop reason: HOSPADM

## 2017-12-31 RX ORDER — LEVALBUTEROL 1.25 MG/.5ML
1.25 SOLUTION, CONCENTRATE RESPIRATORY (INHALATION)
Status: DISCONTINUED | OUTPATIENT
Start: 2017-12-31 | End: 2018-01-01

## 2017-12-31 RX ORDER — DIGOXIN 125 MCG
125 TABLET ORAL DAILY
COMMUNITY
End: 2020-01-01 | Stop reason: HOSPADM

## 2017-12-31 RX ORDER — MIRTAZAPINE 7.5 MG/1
7.5 TABLET, FILM COATED ORAL
COMMUNITY
End: 2020-01-01 | Stop reason: HOSPADM

## 2017-12-31 RX ORDER — ATORVASTATIN CALCIUM 20 MG/1
20 TABLET, FILM COATED ORAL DAILY
COMMUNITY
End: 2020-01-01 | Stop reason: HOSPADM

## 2017-12-31 RX ORDER — MIRTAZAPINE 15 MG/1
7.5 TABLET, FILM COATED ORAL
Status: DISCONTINUED | OUTPATIENT
Start: 2017-12-31 | End: 2018-01-04 | Stop reason: HOSPADM

## 2017-12-31 RX ORDER — DIGOXIN 125 MCG
125 TABLET ORAL DAILY
Status: DISCONTINUED | OUTPATIENT
Start: 2018-01-01 | End: 2018-01-04 | Stop reason: HOSPADM

## 2017-12-31 RX ORDER — SODIUM CHLORIDE FOR INHALATION 0.9 %
3 VIAL, NEBULIZER (ML) INHALATION
Status: DISCONTINUED | OUTPATIENT
Start: 2017-12-31 | End: 2018-01-01

## 2017-12-31 RX ORDER — PREDNISONE 20 MG/1
40 TABLET ORAL ONCE
Status: COMPLETED | OUTPATIENT
Start: 2017-12-31 | End: 2017-12-31

## 2017-12-31 RX ORDER — ATORVASTATIN CALCIUM 20 MG/1
20 TABLET, FILM COATED ORAL
Status: DISCONTINUED | OUTPATIENT
Start: 2017-12-31 | End: 2018-01-04 | Stop reason: HOSPADM

## 2017-12-31 RX ORDER — ISOSORBIDE MONONITRATE 30 MG/1
30 TABLET, EXTENDED RELEASE ORAL DAILY
Status: DISCONTINUED | OUTPATIENT
Start: 2018-01-01 | End: 2018-01-04 | Stop reason: HOSPADM

## 2017-12-31 RX ORDER — DONEPEZIL HYDROCHLORIDE 10 MG/1
10 TABLET, FILM COATED ORAL
COMMUNITY
End: 2020-01-01 | Stop reason: HOSPADM

## 2017-12-31 RX ORDER — LEVOFLOXACIN 500 MG/1
500 TABLET, FILM COATED ORAL EVERY 24 HOURS
Status: DISCONTINUED | OUTPATIENT
Start: 2017-12-31 | End: 2018-01-04 | Stop reason: HOSPADM

## 2017-12-31 RX ORDER — SODIUM CHLORIDE 9 MG/ML
20 INJECTION, SOLUTION INTRAVENOUS CONTINUOUS
Status: DISCONTINUED | OUTPATIENT
Start: 2017-12-31 | End: 2018-01-02

## 2017-12-31 RX ORDER — METOPROLOL TARTRATE 50 MG/1
50 TABLET, FILM COATED ORAL EVERY EVENING
COMMUNITY
End: 2020-01-01 | Stop reason: HOSPADM

## 2017-12-31 RX ORDER — PANTOPRAZOLE SODIUM 40 MG/1
40 TABLET, DELAYED RELEASE ORAL DAILY
COMMUNITY
End: 2020-01-01 | Stop reason: ALTCHOICE

## 2017-12-31 RX ORDER — MAGNESIUM OXIDE/MAG AA CHELATE 133 MG
1 TABLET ORAL DAILY
COMMUNITY
End: 2020-01-01 | Stop reason: HOSPADM

## 2017-12-31 RX ORDER — SODIUM CHLORIDE FOR INHALATION 0.9 %
3 VIAL, NEBULIZER (ML) INHALATION ONCE
Status: COMPLETED | OUTPATIENT
Start: 2017-12-31 | End: 2017-12-31

## 2017-12-31 RX ADMIN — LEVOFLOXACIN 500 MG: 500 TABLET, FILM COATED ORAL at 16:15

## 2017-12-31 RX ADMIN — DONEPEZIL HYDROCHLORIDE 10 MG: 5 TABLET, FILM COATED ORAL at 21:27

## 2017-12-31 RX ADMIN — ISODIUM CHLORIDE 3 ML: 0.03 SOLUTION RESPIRATORY (INHALATION) at 12:30

## 2017-12-31 RX ADMIN — APIXABAN 2.5 MG: 2.5 TABLET, FILM COATED ORAL at 18:15

## 2017-12-31 RX ADMIN — SODIUM CHLORIDE 20 ML/HR: 0.9 INJECTION, SOLUTION INTRAVENOUS at 17:43

## 2017-12-31 RX ADMIN — ALBUTEROL SULFATE 10 MG: 2.5 SOLUTION RESPIRATORY (INHALATION) at 12:29

## 2017-12-31 RX ADMIN — LEVALBUTEROL 1.25 MG: 1.25 SOLUTION, CONCENTRATE RESPIRATORY (INHALATION) at 20:09

## 2017-12-31 RX ADMIN — IPRATROPIUM BROMIDE 1 MG: 0.5 SOLUTION RESPIRATORY (INHALATION) at 12:29

## 2017-12-31 RX ADMIN — ISODIUM CHLORIDE 3 ML: 0.03 SOLUTION RESPIRATORY (INHALATION) at 20:09

## 2017-12-31 RX ADMIN — ATORVASTATIN CALCIUM 20 MG: 20 TABLET, FILM COATED ORAL at 18:15

## 2017-12-31 RX ADMIN — METOPROLOL TARTRATE 50 MG: 50 TABLET ORAL at 18:15

## 2017-12-31 RX ADMIN — PREDNISONE 40 MG: 20 TABLET ORAL at 16:15

## 2017-12-31 RX ADMIN — MIRTAZAPINE 7.5 MG: 15 TABLET, FILM COATED ORAL at 21:28

## 2017-12-31 RX ADMIN — METHYLPREDNISOLONE SODIUM SUCCINATE 40 MG: 40 INJECTION, POWDER, FOR SOLUTION INTRAMUSCULAR; INTRAVENOUS at 16:15

## 2017-12-31 NOTE — ED NOTES
Walked through the department with myself, O2 on dropped to 88-89 occasionally  When we returned to room patient felt slightly out of breath        Silvestre Dey  12/31/17 2003

## 2017-12-31 NOTE — PROGRESS NOTES
Patient's grandson Ronen Acosta) requested me to call the doctor to see if we could get the patient ordered something other than Remeron for sleep  SLIM pavan  [facility]) advised that we will keep her on her normal routine   If she has trouble sleeping, I can page later tonight to get something else ordered

## 2017-12-31 NOTE — ED PROCEDURE NOTE
PROCEDURE  ECG 12 Lead Documentation  Date/Time: 12/31/2017 12:52 PM  Performed by: Melissa Stevenson  Authorized by: Huang Case D     Indications / Diagnosis:  Sob  ECG reviewed by me, the ED Provider: yes    Patient location:  ED and bedside  Previous ECG:     Previous ECG:  Unavailable  Interpretation:     Interpretation: non-specific    Rate:     ECG rate:  64    ECG rate assessment: normal    Rhythm:     Rhythm: atrial fibrillation    Ectopy:     Ectopy: none    QRS:     QRS axis:  Normal    QRS intervals:  Normal  Conduction:     Conduction: normal    ST segments:     ST segments:  Normal  T waves:     T waves: flattening      Flattening:  III, aVF, V1, V5 and V6  Q waves:     Q waves:  III, aVF, V1 and V3  Other findings:     Other findings: poor R wave progression and U wave    Comments:      No ecg signs of ischemia/ injury

## 2017-12-31 NOTE — ASSESSMENT & PLAN NOTE
Positive physical exam for acute COPD exacerbation  Negative chest x-ray  un documented fever,  Fail p o amoxicillin prescribed by PCP  PCR influenza  Respiratory pathogen profile   Start on IV steroid  Schedule nebulizer   Incentive spirometry

## 2017-12-31 NOTE — ED PROVIDER NOTES
History  Chief Complaint   Patient presents with    Cough     cough x1wk, no other complaints     80 yr  female with hx of copd- does not take inhalers as per grandson  1 week of midl ansal cognestion- productive cough and wheezing/sob/metzger- husbadn has flu-- no other comps- pt has been on oral antibx for last several days -         History provided by:  Patient   used: No        Prior to Admission Medications   Prescriptions Last Dose Informant Patient Reported? Taking?    Multiple Vitamins-Minerals (PRESERVISION AREDS 2+MULTI VIT PO)   Yes Yes   Sig: Take 1 capsule by mouth 2 (two) times a day   Specialty Vitamins Products (MAGNESIUM, AMINO ACID CHELATE,) 133 MG tablet   Yes Yes   Sig: Take 1 tablet by mouth daily   apixaban (ELIQUIS) 2 5 mg   Yes Yes   Sig: Take 2 5 mg by mouth 2 (two) times a day   atorvastatin (LIPITOR) 20 mg tablet   Yes Yes   Sig: Take 20 mg by mouth daily   digoxin (LANOXIN) 0 125 mg tablet   Yes Yes   Sig: Take 125 mcg by mouth daily   donepezil (ARICEPT) 10 mg tablet   Yes Yes   Sig: Take 10 mg by mouth daily at bedtime   ipratropium-albuterol (COMBIVENT)  mcg/act inhaler   Yes Yes   Sig: Inhale 1 puff 2 (two) times a day   isosorbide dinitrate (ISORDIL) 30 mg tablet   Yes Yes   Sig: Take 30 mg by mouth daily   metoprolol tartrate (LOPRESSOR) 25 mg tablet   Yes Yes   Sig: Take 25 mg by mouth every morning   metoprolol tartrate (LOPRESSOR) 50 mg tablet   Yes Yes   Sig: Take 50 mg by mouth every evening   mirtazapine (REMERON) 7 5 MG tablet   Yes Yes   Sig: Take 7 5 mg by mouth daily at bedtime   pantoprazole (PROTONIX) 40 mg tablet   Yes Yes   Sig: Take 40 mg by mouth daily      Facility-Administered Medications: None       Past Medical History:   Diagnosis Date    Arrhythmia     Asthma     Atrial fibrillation (HCC)     COPD (chronic obstructive pulmonary disease) (HCC)     Dementia     Depression     GERD (gastroesophageal reflux disease)     History of heart artery stent     Hyperlipidemia     Hypertension        History reviewed  No pertinent surgical history  History reviewed  No pertinent family history  I have reviewed and agree with the history as documented  Social History   Substance Use Topics    Smoking status: Never Smoker    Smokeless tobacco: Never Used    Alcohol use No        Review of Systems   Constitutional: Negative  HENT: Positive for congestion  Negative for dental problem, drooling, ear discharge, ear pain, facial swelling, hearing loss, mouth sores, nosebleeds, postnasal drip, rhinorrhea, sinus pain, sinus pressure, sneezing, sore throat, tinnitus, trouble swallowing and voice change  Eyes: Negative  Respiratory: Positive for cough, shortness of breath and wheezing  Negative for apnea, choking, chest tightness and stridor  Cardiovascular: Negative  Gastrointestinal: Negative  Endocrine: Negative  Genitourinary: Negative  Musculoskeletal: Negative  Skin: Negative  Allergic/Immunologic: Negative  Neurological: Negative  Hematological: Negative  Psychiatric/Behavioral: Negative  Physical Exam  ED Triage Vitals   Temperature Pulse Respirations Blood Pressure SpO2   12/31/17 1136 12/31/17 1115 12/31/17 1115 12/31/17 1115 12/31/17 1115   98 4 °F (36 9 °C) 63 18 (!) 236/103 95 %      Temp Source Heart Rate Source Patient Position - Orthostatic VS BP Location FiO2 (%)   12/31/17 1136 12/31/17 1115 12/31/17 1115 12/31/17 1115 --   Oral Monitor Lying Right arm       Pain Score       12/31/17 1115       No Pain           Orthostatic Vital Signs  Vitals:    12/31/17 1115 12/31/17 1131 12/31/17 1230   BP: (!) 236/103 (!) 190/78 (!) 200/81   Pulse: 63 76 86   Patient Position - Orthostatic VS: Lying Lying Sitting       Physical Exam   Constitutional: She is oriented to person, place, and time  No distress     htnsive- pulse ox 94 % on ra- intepretation is low- normal-  Pt given enb tx    HENT: Head: Normocephalic and atraumatic  Right Ear: External ear normal    Left Ear: External ear normal    Nose: Nose normal    Mouth/Throat: Oropharynx is clear and moist  No oropharyngeal exudate  Eyes: Conjunctivae and EOM are normal  Pupils are equal, round, and reactive to light  Right eye exhibits no discharge  Left eye exhibits no discharge  No scleral icterus  Mm pink   Neck: Normal range of motion  Neck supple  No JVD present  No tracheal deviation present  No thyromegaly present  Cardiovascular: Normal heart sounds and intact distal pulses  Exam reveals no gallop and no friction rub  No murmur heard  ireg irreg    Pulmonary/Chest: No stridor  No respiratory distress  She has wheezes  She has no rales  She exhibits no tenderness  tachpneic- bialterl;arhonci/expir wheezes   Abdominal: Soft  Bowel sounds are normal  She exhibits no distension and no mass  There is no tenderness  There is no rebound and no guarding  No hernia  Musculoskeletal: She exhibits edema  She exhibits no tenderness or deformity  Trace ble pretibial edema- no assym / erythema/ tenderness- equal bilateral radial/dp pulses   Lymphadenopathy:     She has no cervical adenopathy  Neurological: She is alert and oriented to person, place, and time  No cranial nerve deficit or sensory deficit  She exhibits normal muscle tone  Coordination normal    Skin: Skin is warm  Capillary refill takes less than 2 seconds  No rash noted  She is not diaphoretic  No erythema  No pallor  Psychiatric: She has a normal mood and affect  Her behavior is normal  Judgment and thought content normal    Nursing note and vitals reviewed        ED Medications  Medications   albuterol inhalation solution 10 mg (10 mg Nebulization Given 12/31/17 1229)   ipratropium (ATROVENT) 0 02 % inhalation solution 1 mg (1 mg Nebulization Given 12/31/17 1229)   sodium chloride 0 9 % inhalation solution 3 mL (3 mL Nebulization Given 12/31/17 1230)       Diagnostic Studies  Results Reviewed     Procedure Component Value Units Date/Time    Digoxin level [[id number]] Collected:  12/31/17 1134    Lab Status: In process Specimen:  Blood from Arm, Right Updated:  12/31/17 1246    Troponin I [[id number]]  (Normal) Collected:  12/31/17 1134    Lab Status:  Final result Specimen:  Blood from Arm, Right Updated:  12/31/17 1203     Troponin I 0 02 ng/mL     Narrative:         [employer] Chemistry analyzer 99% cutoff is > 0 04 ng/mL in network labs    o cTnI 99% cutoff is useful only when applied to patients in the clinical setting of myocardial ischemia  o cTnI 99% cutoff should be interpreted in the context of clinical history, ECG findings and possibly cardiac imaging to establish correct diagnosis  o cTnI 99% cutoff may be suggestive but clearly not indicative of a coronary event without the clinical setting of myocardial ischemia  Comprehensive metabolic panel [[id number]]  (Abnormal) Collected:  12/31/17 1134    Lab Status:  Final result Specimen:  Blood from Arm, Right Updated:  12/31/17 1201     Sodium 139 mmol/L      Potassium 4 0 mmol/L      Chloride 100 mmol/L      CO2 29 mmol/L      Anion Gap 10 mmol/L      BUN 23 mg/dL      Creatinine 1 03 mg/dL      Glucose 120 mg/dL      Calcium 9 0 mg/dL      AST 48 (H) U/L      ALT 48 U/L      Alkaline Phosphatase 104 U/L      Total Protein 7 6 g/dL      Albumin 3 7 g/dL      Total Bilirubin 1 00 mg/dL      eGFR 48 ml/min/1 73sq m     Narrative:         National Kidney Disease Education Program recommendations are as follows:  GFR calculation is accurate only with a steady state creatinine  Chronic Kidney disease less than 60 ml/min/1 73 sq  meters  Kidney failure less than 15 ml/min/1 73 sq  meters      Protime-INR [[id number]]  (Normal) Collected:  12/31/17 1134    Lab Status:  Final result Specimen:  Blood from Arm, Right Updated:  12/31/17 1155     Protime 13 9 seconds      INR 1 04    APTT [[id number]]  (Normal) Collected:  12/31/17 1134 Lab Status:  Final result Specimen:  Blood from Arm, Right Updated:  12/31/17 1155     PTT 30 seconds     Narrative:          Therapeutic Heparin Range = 60-90 seconds    CBC and differential [[id number]]  (Normal) Collected:  12/31/17 1134    Lab Status:  Final result Specimen:  Blood from Arm, Right Updated:  12/31/17 1145     WBC 7 70 Thousand/uL      RBC 4 51 Million/uL      Hemoglobin 14 3 g/dL      Hematocrit 44 3 %      MCV 98 fL      MCH 31 7 pg      MCHC 32 3 g/dL      RDW 14 5 %      MPV 10 9 fL      Platelets 860 Thousands/uL      Neutrophils Relative 69 %      Lymphocytes Relative 19 %      Monocytes Relative 12 %      Eosinophils Relative 0 %      Basophils Relative 0 %      Neutrophils Absolute 5 26 Thousands/µL      Lymphocytes Absolute 1 49 Thousands/µL      Monocytes Absolute 0 89 Thousand/µL      Eosinophils Absolute 0 03 Thousand/µL      Basophils Absolute 0 03 Thousands/µL                  X-ray chest 2 views   ED Interpretation by Madan Suggs MD (12/31 1250)   No change from previous                 Procedures  Procedures       Phone Contacts  ED Phone Contact    ED Course  ED Course as of Dec 31 1532   Sun Dec 31, 2017   1221 Er md note- recent labs reviewed by er md    56 Cxr pa/lat-  no sign changes from previous 8/16/15-- cardiomegaly -- no change in mediastinum- no evidence of infiltrate/ ptx/ pulm edema/ pleural effusion/ free/sq air    1526 Er md note- pt did tolerate ambulation - btu did desat into high 80's-- disposition d/w pt and grandson -- both feel that pt should be admitted for breathing/copd- aware of lkely influenza as  currently tested pos for flu- also aware that there is no tx at this time for flu with 5-7 days of symptoms-- pt and gs agree to hosp admit                                MDM  The patient presented with a condition in which there was a high probability of imminent or life-threatening deterioration, and critical care services (excluding separately billable procedures) totalled 30-74 minutes  Disposition  Final diagnoses:   None     ED Disposition     None      Follow-up Information    None       Patient's Medications   Discharge Prescriptions    No medications on file     No discharge procedures on file      ED Provider  Electronically Signed by           Abel Xiao MD  12/31/17 6770

## 2017-12-31 NOTE — H&P
H&P- [place] 1/15/1928, 80 y o  female MRN: [id number]    Unit/Bed#: ED 23 Encounter: [id number]    Primary Care Provider: Lynn Rodriguez MD   Date and time admitted to hospital: 12/31/2017 11:02 AM        * COPD with acute exacerbation [facility])   Assessment & Plan    Positive physical exam for acute COPD exacerbation  Negative chest x-ray  un documented fever,  Fail p o amoxicillin prescribed by PCP  Start on IV steroid  Schedule nebulizer   Incentive spirometry          Hyperlipidemia   Assessment & Plan    Continue statin        Chronic atrial fibrillation (HCC)   Assessment & Plan    Stable  Continue Eliquis   Continue digoxin  Continue beta-blocker        Essential hypertension   Assessment & Plan    Elevated  Start on home medication              VTE Prophylaxis: Apixaban (Eliquis)  / sequential compression device   Code Status:  Full code  POLST: There is no POLST form on file for this patient (pre-hospital)    Anticipated Length of Stay:  Patient will be admitted on an Inpatient basis with an anticipated length of stay of  >2 midnights  Justification for Hospital Stay:  Acute COPD exacerbation  Total Time for Visit, including Counseling / Coordination of Care: 45 minutes  Greater than 50% of this total time spent on direct patient counseling and coordination of care  Chief Complaint:   Short of breath  History of Present Illness:    [place] is a 80 y o  female significant past medical history of COPD, Atrial  fibrillation, HTN, who was brought today by his granddaughter because patient has been complaining short of breath which is getting worse for the last 24 hours, patient reported it started 3 days ago reason why she went to the PCP who prescribed her amoxicillin but patient did not improved reason why the grandson brought her here to the ED patient reports short of breath accompanied with dry cough and wheezing   Patient has been in contact with  which is in a nursing home with the flu  Patient denied any chest pain, palpitation, abdominal pain, urinary changes  ADLS independent   Review of Systems:    Review of Systems   Constitutional: Negative for activity change, appetite change and chills  HENT: Negative for congestion, dental problem and drooling  Eyes: Negative for discharge and itching  Respiratory: Positive for cough, shortness of breath and wheezing  Negative for apnea, choking, chest tightness and stridor  Gastrointestinal: Negative for abdominal distention, abdominal pain, anal bleeding and blood in stool  Endocrine: Negative for cold intolerance and heat intolerance  Genitourinary: Negative for difficulty urinating, dyspareunia and dysuria  Musculoskeletal: Negative for arthralgias, back pain and gait problem  Skin: Negative for color change, pallor and rash  Neurological: Negative for dizziness, facial asymmetry and headaches  Psychiatric/Behavioral: Negative for agitation and behavioral problems  Past Medical and Surgical History:     Past Medical History:   Diagnosis Date    Arrhythmia     Asthma     Atrial fibrillation ([facility] )     COPD (chronic obstructive pulmonary disease) ([facility] )     Dementia     Depression     GERD (gastroesophageal reflux disease)     History of heart artery stent     Hyperlipidemia     Hypertension        History reviewed  No pertinent surgical history  Meds/Allergies:    Prior to Admission medications    Medication Sig Start Date End Date Taking?  Authorizing Provider   apixaban (ELIQUIS) 2 5 mg Take 2 5 mg by mouth 2 (two) times a day   Yes Historical Provider, MD   atorvastatin (LIPITOR) 20 mg tablet Take 20 mg by mouth daily   Yes Historical Provider, MD   digoxin (LANOXIN) 0 125 mg tablet Take 125 mcg by mouth daily   Yes Historical Provider, MD   donepezil (ARICEPT) 10 mg tablet Take 10 mg by mouth daily at bedtime   Yes Historical Provider, MD   ipratropium-albuterol (COMBIVENT)  mcg/act inhaler Inhale 1 puff 2 (two) times a day   Yes Historical Provider, MD   isosorbide dinitrate (ISORDIL) 30 mg tablet Take 30 mg by mouth daily   Yes Historical Provider, MD   metoprolol tartrate (LOPRESSOR) 25 mg tablet Take 25 mg by mouth every morning   Yes Historical Provider, MD   metoprolol tartrate (LOPRESSOR) 50 mg tablet Take 50 mg by mouth every evening   Yes Historical Provider, MD   mirtazapine (REMERON) 7 5 MG tablet Take 7 5 mg by mouth daily at bedtime   Yes Historical Provider, MD   Multiple Vitamins-Minerals (PRESERVISION AREDS 2+MULTI VIT PO) Take 1 capsule by mouth 2 (two) times a day   Yes Historical Provider, MD   pantoprazole (PROTONIX) 40 mg tablet Take 40 mg by mouth daily   Yes Historical Provider, MD   Specialty Vitamins Products (MAGNESIUM, AMINO ACID CHELATE,) 133 MG tablet Take 1 tablet by mouth daily   Yes Historical Provider, MD     I have reviewed home medications with patient personally  Allergies: Allergies   Allergen Reactions    Asa [Aspirin]     Shellfish-Derived Products      IV dye    Thiazide-Type Diuretics        Social History:     Marital Status: /Civil Union   Occupation:   Patient Pre-hospital Living Situation:   Patient Pre-hospital Level of Mobility:   Patient Pre-hospital Diet Restrictions:   Substance Use History:   History   Alcohol Use No     History   Smoking Status    Never Smoker   Smokeless Tobacco    Never Used     History   Drug Use No       Family History:    non-contributory    Physical Exam:     Vitals:   Blood Pressure: 151/72 (12/31/17 1417)  Pulse: 96 (12/31/17 1417)  Temperature: 98 4 °F (36 9 °C) (12/31/17 1136)  Temp Source: Oral (12/31/17 1136)  Respirations: 18 (12/31/17 1417)  Weight - Scale: 51 8 kg (114 lb 3 2 oz) (12/31/17 1115)  SpO2: 91 % (12/31/17 1417)    Physical Exam   Constitutional: She is oriented to person, place, and time  No distress  Neck: No JVD present  No tracheal deviation present  No thyromegaly present     Cardiovascular: Normal rate and regular rhythm  Exam reveals no gallop and no friction rub  No murmur heard  Pulmonary/Chest: No respiratory distress  She has wheezes  She has no rales  She exhibits no tenderness  Lymphadenopathy:     She has no cervical adenopathy  Neurological: She is alert and oriented to person, place, and time  She has normal reflexes  She displays normal reflexes  No cranial nerve deficit  She exhibits normal muscle tone  Coordination normal    Skin: Skin is warm  She is not diaphoretic  Psychiatric: She has a normal mood and affect  Additional Data:     Lab Results: I have personally reviewed pertinent reports  Results from last 7 days  Lab Units 12/31/17  1134   WBC Thousand/uL 7 70   HEMOGLOBIN g/dL 14 3   HEMATOCRIT % 44 3   PLATELETS Thousands/uL 258   NEUTROS PCT % 69   LYMPHS PCT % 19   MONOS PCT % 12   EOS PCT % 0       Results from last 7 days  Lab Units 12/31/17  1134   SODIUM mmol/L 139   POTASSIUM mmol/L 4 0   CHLORIDE mmol/L 100   CO2 mmol/L 29   BUN mg/dL 23   CREATININE mg/dL 1 03   CALCIUM mg/dL 9 0   TOTAL PROTEIN g/dL 7 6   BILIRUBIN TOTAL mg/dL 1 00   ALK PHOS U/L 104   ALT U/L 48   AST U/L 48*   GLUCOSE RANDOM mg/dL 120       Results from last 7 days  Lab Units 12/31/17  1134   INR  1 04       Imaging: I have personally reviewed pertinent reports  X-ray Chest 2 Views    Result Date: 12/31/2017  Narrative: CHEST - DUAL ENERGY INDICATION:  Cough and congestion COMPARISON:  Chest radiograph 8/16/15 VIEWS:  PA (including soft tissue/bone algorithms) and lateral projections IMAGES:  4 FINDINGS:     Heart shadow is enlarged but unchanged from prior exam  No focal consolidation  Mild blunting of the left costophrenic angle  No pneumothorax  Visualized osseous structures again demonstrate multiple thoracic compression deformities  Impression: 1  Stable cardiomegaly  2   Blunting of the left costophrenic angle compatible with small effusion versus scarring  Workstation performed: [id number]       EKG, Pathology, and Other Studies Reviewed on Admission:   · EKG:    Allscripts / Epic Records Reviewed: Yes     ** Please Note: This note has been constructed using a voice recognition system   **

## 2017-12-31 NOTE — ED PROCEDURE NOTE
PROCEDURE  CriticalCare Time  Performed by: Izabel Rios  Authorized by: Izabel Rios     Critical care provider statement:     Critical care time (minutes):  35    Critical care start time:  12/31/2017 2:15 PM    Critical care end time:  12/31/2017 2:50 PM    Critical care time was exclusive of:  Separately billable procedures and treating other patients and teaching time    Critical care was time spent personally by me on the following activities:  Examination of patient, development of treatment plan with patient or surrogate, obtaining history from patient or surrogate, evaluation of patient's response to treatment, review of old charts, re-evaluation of patient's condition, ordering and review of radiographic studies and ordering and review of laboratory studies    I assumed direction of critical care for this patient from another provider in my specialty: no

## 2017-12-31 NOTE — SEPSIS NOTE
Sepsis Note   [place] 80 y o  female MRN: [id number]  Unit/Bed#: ED 23 Encounter: [id number]            Initial Sepsis Screening     Row Name 12/31/17 2845                Is the patient's history suggestive of a new or worsening infection? (!)  Yes (Proceed)  -MB        Suspected source of infection pneumonia  -MB        Are two or more of the following signs & symptoms of infection both present and new to the patient? No  -MB        Indicate SIRS criteria Tachycardia > 90 bpm  -MB        If the answer is yes to both questions, suspicion of sepsis is present          If severe sepsis is present AND tissue hypoperfusion perists in the hour after fluid resuscitation or lactate > 4, the patient meets criteria for SEPTIC SHOCK          Are any of the following organ dysfunction criteria present within 6 hours of suspected infection and SIRS criteria that are NOT considered to be chronic conditions?         Organ dysfunction          Date of presentation of severe sepsis          Time of presentation of severe sepsis          Tissue hypoperfusion persists in the hour after crystalloid fluid administration, evidenced, by either:          Was hypotension present within one hour of the conclusion of crystalloid fluid administration?           Date of presentation of septic shock          Time of presentation of septic shock            User Key  (r) = Recorded By, (t) = Taken By, (c) = Cosigned By    [address] Name Provider Type    RAMÍREZ Hayes MD Physician

## 2018-01-01 PROBLEM — A41.9 SEPSIS (HCC): Status: ACTIVE | Noted: 2018-01-01

## 2018-01-01 PROBLEM — A41.9 SEPSIS (HCC): Status: RESOLVED | Noted: 2018-01-01 | Resolved: 2018-01-01

## 2018-01-01 PROBLEM — J10.1 INFLUENZA A H1N1 INFECTION: Status: ACTIVE | Noted: 2018-01-01

## 2018-01-01 LAB
ADENOVIRUS: NOT DETECTED
ANION GAP SERPL CALCULATED.3IONS-SCNC: 10 MMOL/L (ref 4–13)
BUN SERPL-MCNC: 19 MG/DL (ref 5–25)
C PNEUM DNA SPEC QL NAA+PROBE: NOT DETECTED
CALCIUM SERPL-MCNC: 8.8 MG/DL (ref 8.3–10.1)
CHLORIDE SERPL-SCNC: 101 MMOL/L (ref 100–108)
CO2 SERPL-SCNC: 28 MMOL/L (ref 21–32)
CREAT SERPL-MCNC: 0.98 MG/DL (ref 0.6–1.3)
ERYTHROCYTE [DISTWIDTH] IN BLOOD BY AUTOMATED COUNT: 14.6 % (ref 11.6–15.1)
FLUAV AG SPEC QL: DETECTED
FLUAV H1 RNA SPEC QL NAA+PROBE: NOT DETECTED
FLUAV H3 RNA SPEC QL NAA+PROBE: NOT DETECTED
FLUAV RNA SPEC QL NAA+PROBE: NOT DETECTED
FLUBV AG SPEC QL: ABNORMAL
FLUBV RNA SPEC QL NAA+PROBE: NOT DETECTED
GFR SERPL CREATININE-BSD FRML MDRD: 51 ML/MIN/1.73SQ M
GLUCOSE SERPL-MCNC: 149 MG/DL (ref 65–140)
HBOV DNA SPEC QL NAA+PROBE: NOT DETECTED
HCOV 229E RNA SPEC QL NAA+PROBE: NOT DETECTED
HCOV HKU1 RNA SPEC QL NAA+PROBE: NOT DETECTED
HCOV NL63 RNA SPEC QL NAA+PROBE: NOT DETECTED
HCOV OC43 RNA SPEC QL NAA+PROBE: NOT DETECTED
HCT VFR BLD AUTO: 45 % (ref 34.8–46.1)
HGB BLD-MCNC: 14.1 G/DL (ref 11.5–15.4)
HPIV1 RNA SPEC QL NAA+PROBE: NOT DETECTED
HPIV2 RNA SPEC QL NAA+PROBE: NOT DETECTED
HPIV3 RNA SPEC QL NAA+PROBE: NOT DETECTED
HPIV4 RNA SPEC QL NAA+PROBE: NOT DETECTED
M PNEUMO DNA SPEC QL NAA+PROBE: NOT DETECTED
MCH RBC QN AUTO: 30.8 PG (ref 26.8–34.3)
MCHC RBC AUTO-ENTMCNC: 31.3 G/DL (ref 31.4–37.4)
MCV RBC AUTO: 98 FL (ref 82–98)
METAPNEUMOVIRUS: NOT DETECTED
PLATELET # BLD AUTO: 290 THOUSANDS/UL (ref 149–390)
PMV BLD AUTO: 10.7 FL (ref 8.9–12.7)
POTASSIUM SERPL-SCNC: 3.9 MMOL/L (ref 3.5–5.3)
RBC # BLD AUTO: 4.58 MILLION/UL (ref 3.81–5.12)
RHINOVIRUS RNA SPEC QL NAA+PROBE: NOT DETECTED
RSV A RNA SPEC QL NAA+PROBE: NOT DETECTED
RSV B RNA SPEC QL NAA+PROBE: ABNORMAL
RSV B RNA SPEC QL NAA+PROBE: NOT DETECTED
SODIUM SERPL-SCNC: 139 MMOL/L (ref 136–145)
WBC # BLD AUTO: 3.55 THOUSAND/UL (ref 4.31–10.16)

## 2018-01-01 PROCEDURE — 94760 N-INVAS EAR/PLS OXIMETRY 1: CPT

## 2018-01-01 PROCEDURE — 85027 COMPLETE CBC AUTOMATED: CPT | Performed by: FAMILY MEDICINE

## 2018-01-01 PROCEDURE — 94640 AIRWAY INHALATION TREATMENT: CPT

## 2018-01-01 PROCEDURE — 80048 BASIC METABOLIC PNL TOTAL CA: CPT | Performed by: FAMILY MEDICINE

## 2018-01-01 RX ORDER — ALBUTEROL SULFATE 2.5 MG/3ML
2.5 SOLUTION RESPIRATORY (INHALATION) EVERY 6 HOURS PRN
Status: DISCONTINUED | OUTPATIENT
Start: 2018-01-01 | End: 2018-01-04 | Stop reason: HOSPADM

## 2018-01-01 RX ORDER — OSELTAMIVIR PHOSPHATE 30 MG/1
30 CAPSULE ORAL EVERY 12 HOURS SCHEDULED
Status: DISCONTINUED | OUTPATIENT
Start: 2018-01-01 | End: 2018-01-04 | Stop reason: HOSPADM

## 2018-01-01 RX ADMIN — LEVALBUTEROL 1.25 MG: 1.25 SOLUTION, CONCENTRATE RESPIRATORY (INHALATION) at 20:02

## 2018-01-01 RX ADMIN — PANTOPRAZOLE SODIUM 40 MG: 40 TABLET, DELAYED RELEASE ORAL at 06:00

## 2018-01-01 RX ADMIN — LEVOFLOXACIN 500 MG: 500 TABLET, FILM COATED ORAL at 17:27

## 2018-01-01 RX ADMIN — OSELTAMIVIR PHOSPHATE 30 MG: 30 CAPSULE ORAL at 21:12

## 2018-01-01 RX ADMIN — MIRTAZAPINE 7.5 MG: 15 TABLET, FILM COATED ORAL at 21:11

## 2018-01-01 RX ADMIN — APIXABAN 2.5 MG: 2.5 TABLET, FILM COATED ORAL at 17:27

## 2018-01-01 RX ADMIN — APIXABAN 2.5 MG: 2.5 TABLET, FILM COATED ORAL at 09:09

## 2018-01-01 RX ADMIN — ISODIUM CHLORIDE 3 ML: 0.03 SOLUTION RESPIRATORY (INHALATION) at 20:02

## 2018-01-01 RX ADMIN — DIGOXIN 125 MCG: 125 TABLET ORAL at 09:09

## 2018-01-01 RX ADMIN — ISODIUM CHLORIDE 3 ML: 0.03 SOLUTION RESPIRATORY (INHALATION) at 13:47

## 2018-01-01 RX ADMIN — OSELTAMIVIR PHOSPHATE 30 MG: 30 CAPSULE ORAL at 11:33

## 2018-01-01 RX ADMIN — ISOSORBIDE MONONITRATE 30 MG: 30 TABLET, EXTENDED RELEASE ORAL at 09:09

## 2018-01-01 RX ADMIN — ATORVASTATIN CALCIUM 20 MG: 20 TABLET, FILM COATED ORAL at 17:27

## 2018-01-01 RX ADMIN — DONEPEZIL HYDROCHLORIDE 10 MG: 5 TABLET, FILM COATED ORAL at 21:11

## 2018-01-01 RX ADMIN — ISODIUM CHLORIDE 3 ML: 0.03 SOLUTION RESPIRATORY (INHALATION) at 08:59

## 2018-01-01 RX ADMIN — METHYLPREDNISOLONE SODIUM SUCCINATE 40 MG: 40 INJECTION, POWDER, FOR SOLUTION INTRAMUSCULAR; INTRAVENOUS at 00:42

## 2018-01-01 RX ADMIN — LEVALBUTEROL 1.25 MG: 1.25 SOLUTION, CONCENTRATE RESPIRATORY (INHALATION) at 13:47

## 2018-01-01 RX ADMIN — METHYLPREDNISOLONE SODIUM SUCCINATE 40 MG: 40 INJECTION, POWDER, FOR SOLUTION INTRAMUSCULAR; INTRAVENOUS at 09:09

## 2018-01-01 RX ADMIN — METOPROLOL TARTRATE 50 MG: 50 TABLET ORAL at 17:27

## 2018-01-01 RX ADMIN — METOPROLOL TARTRATE 25 MG: 25 TABLET ORAL at 09:09

## 2018-01-01 RX ADMIN — LEVALBUTEROL 1.25 MG: 1.25 SOLUTION, CONCENTRATE RESPIRATORY (INHALATION) at 08:59

## 2018-01-01 RX ADMIN — METHYLPREDNISOLONE SODIUM SUCCINATE 40 MG: 40 INJECTION, POWDER, FOR SOLUTION INTRAMUSCULAR; INTRAVENOUS at 17:28

## 2018-01-01 NOTE — PLAN OF CARE
Problem: Potential for Falls  Goal: Patient will remain free of falls  INTERVENTIONS:  - Assess patient frequently for physical needs  -  Identify cognitive and physical deficits and behaviors that affect risk of falls    -  [place] fall precautions as indicated by assessment   - Educate patient/family on patient safety including physical limitations  - Instruct patient to call for assistance with activity based on assessment  - Modify environment to reduce risk of injury  - Consider OT/PT consult to assist with strengthening/mobility   Outcome: Progressing

## 2018-01-01 NOTE — ASSESSMENT & PLAN NOTE
· Failed oral outpatient amoxicillin prescribed by PCP  · Still with bronchospasms today  · Respiratory protocol  · Continue IV steroids   · Schedule nebulizer   · Incentive spirometry

## 2018-01-01 NOTE — PROGRESS NOTES
Progress Note - [place] 1/15/1928, 80 y o  female MRN: [id number]    Unit/Bed#: -01 Encounter: [id number]    Primary Care Provider: Perry Boss MD   Date and time admitted to hospital: 12/31/2017 11:02 AM        * COPD with acute exacerbation ([facility] [address] )   Assessment & Plan    · Failed oral outpatient amoxicillin prescribed by PCP  · Still with bronchospasms today  · Respiratory protocol  · Continue IV steroids   · Schedule nebulizer   · Incentive spirometry          Influenza A H1N1 infection   Assessment & Plan    · Will start tamiflu today  · supportive care         Chronic atrial fibrillation (HCC)   Assessment & Plan    · Stable  · Continue Eliquis   · Continue current medical treatment         Essential hypertension   Assessment & Plan    · Continue blood pressure medications as outlined  · Blood pressure is acceptable         Hyperlipidemia   Assessment & Plan    · Continue statin          VTE Pharmacologic Prophylaxis:   Pharmacologic: Apixaban (Eliquis)  Mechanical VTE Prophylaxis in Place: Yes    Patient Centered Rounds: I have performed bedside rounds with nursing staff today  Discussions with Specialists or Other Care Team Provider:  Primary RN    Education and Discussions with Family / Patient:  Patient and her family    Time Spent for Care: 20 minutes  More than 50% of total time spent on counseling and coordination of care as described above  Current Length of Stay: 1 day(s)    Current Patient Status: Inpatient   Certification Statement: The patient will continue to require additional inpatient hospital stay due to Bronchospasm and COPD exacerbation secondary to the flu  Discharge Plan / Estimated Discharge Date:  Possibly tomorrow      Code Status: Level 1 - Full Code      Subjective:   Complains of a cough  Was not productive  No chest pain or shortness of breath at rest   Does get winded when she is up walking around  Does not wear oxygen at home  No nausea vomiting  Fever has improved  Is overall feeling better  Objective:     Vitals:   Temp (24hrs), Av 1 °F (36 7 °C), Min:97 9 °F (36 6 °C), Max:98 4 °F (36 9 °C)    HR:  [] 81  Resp:  [16-18] 18  BP: (132-236)/() 171/80  SpO2:  [91 %-100 %] 97 %  Body mass index is 22 3 kg/m²  Input and Output Summary (last 24 hours):     No intake or output data in the 24 hours ending 18 1030    Physical Exam:     Physical Exam   Constitutional: She is oriented to person, place, and time  She appears well-nourished  No distress  HENT:   Head: Normocephalic  Eyes: Pupils are equal, round, and reactive to light  Neck: Normal range of motion  Neck supple  Cardiovascular: Regular rhythm  Tachycardic   Pulmonary/Chest:   Very decreased bilaterally with scattered wheezing  Mild conversational dyspnea with exertion  Abdominal: Soft  Bowel sounds are normal    Musculoskeletal: Normal range of motion  She exhibits no edema  Neurological: She is alert and oriented to person, place, and time  Skin: Skin is warm  Psychiatric: She has a normal mood and affect  Her behavior is normal  Thought content normal    Nursing note and vitals reviewed        Additional Data:     Labs:      Results from last 7 days  Lab Units 18  0603 17  1134   WBC Thousand/uL 3 55* 7 70   HEMOGLOBIN g/dL 14 1 14 3   HEMATOCRIT % 45 0 44 3   PLATELETS Thousands/uL 290 258   NEUTROS PCT %  --  69   LYMPHS PCT %  --  19   MONOS PCT %  --  12   EOS PCT %  --  0       Results from last 7 days  Lab Units 18  0603 17  1134   SODIUM mmol/L 139 139   POTASSIUM mmol/L 3 9 4 0   CHLORIDE mmol/L 101 100   CO2 mmol/L 28 29   BUN mg/dL 19 23   CREATININE mg/dL 0 98 1 03   CALCIUM mg/dL 8 8 9 0   TOTAL PROTEIN g/dL  --  7 6   BILIRUBIN TOTAL mg/dL  --  1 00   ALK PHOS U/L  --  104   ALT U/L  --  48   AST U/L  --  48*   GLUCOSE RANDOM mg/dL 149* 120       Results from last 7 days  Lab Units 17  1134   INR  1 04       * I Have Reviewed All Lab Data Listed Above  Imaging:    Imaging Reports Reviewed Today Include:  Chest x-ray      Recent Cultures (last 7 days):       Results from last 7 days  Lab Units 12/31/17  1600   INFLUENZA A PCR  Detected*   INFLUENZA B PCR  None Detected   RSV PCR  None Detected       Last 24 Hours Medication List:     apixaban 2 5 mg Oral BID   atorvastatin 20 mg Oral Daily With Dinner   digoxin 125 mcg Oral Daily   donepezil 10 mg Oral HS   isosorbide mononitrate 30 mg Oral Daily   levalbuterol 1 25 mg Nebulization TID   And      sodium chloride 3 mL Nebulization TID   levofloxacin 500 mg Oral Q24H   methylPREDNISolone sodium succinate 40 mg Intravenous Q8H   metoprolol tartrate 25 mg Oral QAM   metoprolol tartrate 50 mg Oral QPM   mirtazapine 7 5 mg Oral HS   pantoprazole 40 mg Oral Daily Before Breakfast        Today, Patient Was Seen By: JUAN Steen    ** Please Note: Dragon 360 Dictation voice to text software may have been used in the creation of this document   **

## 2018-01-02 PROCEDURE — 97167 OT EVAL HIGH COMPLEX 60 MIN: CPT

## 2018-01-02 PROCEDURE — 97163 PT EVAL HIGH COMPLEX 45 MIN: CPT

## 2018-01-02 PROCEDURE — G8979 MOBILITY GOAL STATUS: HCPCS

## 2018-01-02 PROCEDURE — G8988 SELF CARE GOAL STATUS: HCPCS

## 2018-01-02 PROCEDURE — G8987 SELF CARE CURRENT STATUS: HCPCS

## 2018-01-02 PROCEDURE — 97116 GAIT TRAINING THERAPY: CPT

## 2018-01-02 PROCEDURE — G8978 MOBILITY CURRENT STATUS: HCPCS

## 2018-01-02 RX ORDER — ACETAMINOPHEN 325 MG/1
650 TABLET ORAL EVERY 6 HOURS PRN
Status: DISCONTINUED | OUTPATIENT
Start: 2018-01-02 | End: 2018-01-04 | Stop reason: HOSPADM

## 2018-01-02 RX ORDER — METHYLPREDNISOLONE SODIUM SUCCINATE 40 MG/ML
40 INJECTION, POWDER, LYOPHILIZED, FOR SOLUTION INTRAMUSCULAR; INTRAVENOUS DAILY
Status: DISCONTINUED | OUTPATIENT
Start: 2018-01-03 | End: 2018-01-04 | Stop reason: HOSPADM

## 2018-01-02 RX ADMIN — MIRTAZAPINE 7.5 MG: 15 TABLET, FILM COATED ORAL at 21:26

## 2018-01-02 RX ADMIN — LEVOFLOXACIN 500 MG: 500 TABLET, FILM COATED ORAL at 15:37

## 2018-01-02 RX ADMIN — ISOSORBIDE MONONITRATE 30 MG: 30 TABLET, EXTENDED RELEASE ORAL at 09:20

## 2018-01-02 RX ADMIN — DONEPEZIL HYDROCHLORIDE 10 MG: 5 TABLET, FILM COATED ORAL at 21:26

## 2018-01-02 RX ADMIN — APIXABAN 2.5 MG: 2.5 TABLET, FILM COATED ORAL at 18:26

## 2018-01-02 RX ADMIN — METHYLPREDNISOLONE SODIUM SUCCINATE 40 MG: 40 INJECTION, POWDER, FOR SOLUTION INTRAMUSCULAR; INTRAVENOUS at 09:20

## 2018-01-02 RX ADMIN — PANTOPRAZOLE SODIUM 40 MG: 40 TABLET, DELAYED RELEASE ORAL at 06:35

## 2018-01-02 RX ADMIN — APIXABAN 2.5 MG: 2.5 TABLET, FILM COATED ORAL at 09:20

## 2018-01-02 RX ADMIN — ATORVASTATIN CALCIUM 20 MG: 20 TABLET, FILM COATED ORAL at 15:36

## 2018-01-02 RX ADMIN — DIGOXIN 125 MCG: 125 TABLET ORAL at 09:21

## 2018-01-02 RX ADMIN — METOPROLOL TARTRATE 25 MG: 25 TABLET ORAL at 09:21

## 2018-01-02 RX ADMIN — METHYLPREDNISOLONE SODIUM SUCCINATE 40 MG: 40 INJECTION, POWDER, FOR SOLUTION INTRAMUSCULAR; INTRAVENOUS at 00:04

## 2018-01-02 RX ADMIN — OSELTAMIVIR PHOSPHATE 30 MG: 30 CAPSULE ORAL at 09:21

## 2018-01-02 RX ADMIN — SODIUM CHLORIDE 20 ML/HR: 0.9 INJECTION, SOLUTION INTRAVENOUS at 09:25

## 2018-01-02 RX ADMIN — OSELTAMIVIR PHOSPHATE 30 MG: 30 CAPSULE ORAL at 21:26

## 2018-01-02 RX ADMIN — ACETAMINOPHEN 650 MG: 325 TABLET, FILM COATED ORAL at 21:26

## 2018-01-02 RX ADMIN — METOPROLOL TARTRATE 50 MG: 50 TABLET ORAL at 18:26

## 2018-01-02 NOTE — PLAN OF CARE
Problem: OCCUPATIONAL THERAPY ADULT  Goal: Performs self-care activities at highest level of function for planned discharge setting  See evaluation for individualized goals  Treatment Interventions: ADL retraining, Functional transfer training, Endurance training, Cognitive reorientation, Patient/family training, Continued evaluation          See flowsheet documentation for full assessment, interventions and recommendations  Limitation: Decreased ADL status, Decreased Safe judgement during ADL, Decreased cognition, Decreased high-level ADLs     Assessment: Pt is an 79 yo female [facility]  Pt presents w/ COPD w/ acute exacerbation and significant PMH impacting her occupational performance including a-fib, HTN, dementia  Pt reports living in 1 [facility] w/ 3 REINIER PTA  Pt reports living w/ her  and having caretaker few days a week  Pt added that her  had a fall recently and in not home  Pt questionable historian upon evaluation, and oriented to person  Pt able to report her birthday, and aware that it is coming up  Pt able to report month and year, but unable to recall recent events  Pt disoriented to situation  Pt completed ADL's w/ S and VC to increase insight / awareness after set- up  Pt engaged in functional mobility w/ out AD and S  Pt presents w/ decreased recall, decreased orientation, decreased dynamic standing balance, and decreased endurance impacting her I w/ dressing, bathing, functional mobility, functional transfers, clothing mgmt, food prep / clean up  Pt would benefit from OT while in acute care to address deficits  From an OT perspective, pt woudl benefit from post acute rehab when medically stable for discharge from acute care  Anticiapte that pt will need additional supervision / assistance in order to safely return to OF  Will continue to follow pt while in acute care  Recommend continued evalution functional cognitive skills next session       OT Discharge Recommendation: Other (Comment) (post acute rehab)  OT - OK to Discharge:  (to post acute rehab when medically stable)

## 2018-01-02 NOTE — PROGRESS NOTES
Received report on patient, patient awake and alert  No complaints of pain or discomfort at this time  Call bell within reach, bed alarm on  Continue to monitor

## 2018-01-02 NOTE — PLAN OF CARE
DISCHARGE PLANNING     Discharge to home or other facility with appropriate resources Progressing        DISCHARGE PLANNING - CARE MANAGEMENT     Discharge to post-acute care or home with appropriate resources Progressing        INFECTION - ADULT     Absence or prevention of progression during hospitalization Progressing        Knowledge Deficit     Patient/family/caregiver demonstrates understanding of disease process, treatment plan, medications, and discharge instructions Progressing        PAIN - ADULT     Verbalizes/displays adequate comfort level or baseline comfort level Progressing        Potential for Falls     Patient will remain free of falls Progressing        RESPIRATORY - ADULT     Achieves optimal ventilation and oxygenation Progressing        SAFETY ADULT     Maintain or return to baseline ADL function Progressing     Maintain or return mobility status to optimal level Progressing

## 2018-01-02 NOTE — PLAN OF CARE
Problem: PHYSICAL THERAPY ADULT  Goal: Performs mobility at highest level of function for planned discharge setting  See evaluation for individualized goals  Treatment/Interventions: Functional transfer training, LE strengthening/ROM, Elevations, Therapeutic exercise, Endurance training, Patient/family training, Equipment eval/education, Bed mobility, Gait training, Spoke to nursing, OT          See flowsheet documentation for full assessment, interventions and recommendations  Prognosis: Good  Problem List: Decreased strength, Impaired balance, Decreased endurance, Decreased mobility, Decreased cognition, Decreased safety awareness  Assessment: pt is an 88y/o f who presents to [facility] c COPD exacerbation  currently on droplet precautions to r/o influenza  also on 1L O2 which pt does not require at bseline  PMH significant for HTN, a-fib, hyperlipdemia, + dementia  at baseline, pt lives alone in ranch style home c 2-3 REINIER  pt's spouse currently residing at [facility] s/p fall  pt reports being (I) c functional mobility  oriented only to person during PT session  curerntly presents c deficits in strength, balance, gait quality, cognition + activity tolerance noted in PT exam above  Barthel Index 55/100  ambulated 20' c (S) c min verbal cues for safety 2* multiple lines  able to sit OOB in chair at end of session c chair alarm activated  would benefit from skilled PT to maximize functional mobility + improve quality of life  upon d/c, recommend STR 2* pt living alone + high fall risk  PT eval of high complexity 2* unstable med status c pt requiring ongoing medical management 2* COPD exacerbation  remains on droplet precautions + 1 L O2  presents c multiple co-morbidities + mobility deficits noted above  pt lives alone in ranch home c 2-3 REINIER + at high risk for falls    Barriers to Discharge: Inaccessible home environment, Decreased caregiver support     Recommendation: Short-term skilled PT     PT - OK to Discharge: Yes (to STR )    See flowsheet documentation for full assessment

## 2018-01-02 NOTE — OCCUPATIONAL THERAPY NOTE
[address] Evaluation     Patient Name: Emelyn KEATING Date: 1/2/2018  Problem List  Patient Active Problem List   Diagnosis    [facility] exacerbation ([facility]    Essential hypertension    Chronic atrial fibrillation ([facility] )    Hyperlipidemia    Influenza A H1N1 infection     Past Medical History  Past Medical History:   Diagnosis Date    Arrhythmia     Asthma     Atrial fibrillation ([facility] )     COPD (chronic obstructive pulmonary disease) ([facility] )     Dementia     Depression     GERD (gastroesophageal reflux disease)     History of heart artery stent     Hyperlipidemia     Hypertension      Past Surgical History  History reviewed  No pertinent surgical history  01/02/18 0823   Note Type   Note type Eval/Treat   Restrictions/Precautions   Weight Bearing Precautions Per Order No   Other Precautions Droplet precautions;O2;Fall Risk;Multiple lines;Cognitive; Chair Alarm   Pain Assessment   Pain Assessment No/denies pain   Pain Score No Pain   Home Living   Type of Home House  (3 REINIER)   Home Layout One level   Bathroom Shower/Tub Tub/shower unit   Bathroom Toilet Standard   Bathroom Equipment Shower chair;Grab bars in shower   Bathroom Accessibility Accessible   Home Equipment Walker;Grab bars; Life alert; Other (Comment)  (pt reports walker if her husbands)   Additional Comments Pt reports living in [facility] w/ 3 REINIER  Pt reports living w/ her  and having caretaker a few days a week to assist w/ IADL  Pt questionable historian upon evaluation  Per chart, pt's  in SNF  Pt reports that her hsuband had a fall about one week ago   Prior Function   Level of [place] Independent with ADLs and functional mobility   Lives With [employer] Help From Family; Other (Comment)  (caretaker few days a week)   ADL Assistance Independent   IADLs Needs assistance   Falls in the last 6 months 0   Vocational Retired   Comments Pt reports no O2 use at home, and I w/ ADL PTA   Pt reports ambulating w/ out AD  Lifestyle   Autonomy Pt reports I w/ ADL PTA, and basic IADL  Pt does not drive   Reciprocal Relationships Pt reports living w/  in 1 SH  Per chart, pt's  at SNF  Pt reports caretaker a few days a week   Service to Others Pt reports retired, and added that she worked in [employer] [place] [facility] [place] [employer] [employer] Pt reports enjoying watching her soap operas, and cleaning the house   ADL   Eating Assistance 6  Modified independent   Eating Deficit Setup   Grooming Assistance 5  Supervision/Setup   Grooming Deficit Setup;Supervision/safety   [id number] 5  Supervision/Setup   LB [address]  [address]  6  Modified independent   [facility] 5  Supervision/Setup   LB Dressing Deficit Setup   Additional Comments on 1L O2   Bed Mobility   Supine to Sit Unable to assess   Sit to Supine Unable to assess   Additional Comments Pt in the bathroom upon therapist arrival w/ O2 twisted around IV pole, and NC not in place   Transfers   Sit to Stand 5  Supervision   Additional items Assist x 1; Armrests; Verbal cues   Stand to Sit 5  Supervision   Additional items Verbal cues;Armrests;Assist x 1   Functional Mobility   Functional Mobility 5  Supervision   Additional Comments benefits from VC's for obstacle negotiation, and safety   Additional items (no AD)   Balance   Static Sitting Good   Dynamic Sitting Fair +   Static Standing Fair   Ambulatory Fair   Activity Tolerance   Activity Tolerance Patient limited by fatigue; Other (Comment)  (decreased cognition)   Medical Staff Made Aware spoke to Clarence CHRIS   Nurse Made Aware spoke to RN, Agustín Luevano   RUE Assessment   RUE Assessment X  (able to complete ADL, limited end range shoulder)   RUE Strength   RUE Overall Strength Within Functional Limits - able to perform ADL tasks with strength; Other (Comment)  (shoulder 3/5)   RNAI Assessment   LUE Assessment X  (able to complete ADL, limited end range shoulder)   LUE Strength   LUE Overall Strength Within Functional Limits - able to perform ADL tasks with strength; Other (Comment)  (shoulder 3/5)   Hand Function   Fine Motor Coordination Functional   Vision-Basic Assessment   Current Vision Wears glasses only for reading   Cognition   Overall Cognitive Status Impaired   Arousal/Participation Alert; Cooperative   Attention Attends with cues to redirect  (hard of hearing)   Orientation Level Oriented to person;Disoriented to situation;Disoriented to time;Disoriented to place   Memory Decreased recall of recent events;Decreased short term memory;Decreased recall of biographical information   Following Commands Follows one step commands with increased time or repetition   Comments Pt agreed to participate in OT eval  Pt able to provide limited social history (? accuracy)  Pt oriented to person, and disoriented to time, place, and situation  Pt benefits from consistent eye contact, and minimal distractions due to hard of hearing  Assessment   Limitation Decreased ADL status; Decreased Safe judgement during ADL;Decreased cognition;Decreased high-level ADLs   Assessment Pt is an 81 yo female [facility] on 12/[facility]2017  Pt presents w/ COPD w/ acute exacerbation and significant PMH impacting her occupational performance including a-fib, HTN, dementia  Pt reports living in [facility] w/ 3 REINIER PTA  Pt reports living w/ her  and having caretaker few days a week  Pt added that her  had a fall recently and in not home  Pt questionable historian upon evaluation, and oriented to person  Pt able to report her birthday, and aware that it is coming up  Pt able to report month and year, but unable to recall recent events  Pt disoriented to situation  Pt completed ADL's w/ S and VC to increase insight / awareness after set- up  Pt engaged in functional mobility w/ out AD and S   Pt presents w/ decreased recall, decreased orientation, decreased dynamic standing balance, and decreased endurance impacting her I w/ dressing, bathing, functional mobility, functional transfers, clothing mgmt, food prep / clean up  Pt would benefit from OT while in acute care to address deficits  From an OT perspective, pt woudl benefit from post acute rehab when medically stable for discharge from acute care  Anticiapte that pt will need additional supervision / assistance in order to safely return to PLOF  Will continue to follow pt while in acute care  Recommend continued evalution functional cognitive skills next session  Goals   Patient Goals to go home   Plan   Treatment Interventions ADL retraining;Functional transfer training; Endurance training;Cognitive reorientation;Patient/family training;Continued evaluation   Goal Expiration Date 01/09/18   OT Frequency 2-3x/wk   Recommendation   OT Discharge Recommendation Other (Comment)  (post acute rehab)   OT - OK to Discharge (to post acute rehab when medically stable)   Barthel Index   Feeding 10   Bathing 0   Grooming Score 5   Dressing Score 5   Bladder Score 10   Bowels Score 10   Toilet Use Score 10   Transfers (Bed/Chair) Score 10   Mobility (Level Surface) Score 0   Stairs Score 0   Barthel Index Score 60   Pt goals to be met in 7 days:  1  Pt will complete functional transfers to bed, chair, and toilet w/ mod I  2  Pt will demonstrate good attention and participation in continued evaluation to assess functional cognitive skills to assist in safe discharge planning  3  Pt will demonstrate good attention and verbalize understanding of safety precautions for ADL performance  4  Pt will consistently demonstrate understanding of safety precautions during ADL performance w/ min VC's  5  Pt will complete UB ADL w/ mod I after set- up  6  Pt will complete LB ADL w/ mod I after set- up  7  Pt will complete bed mobility supine <> sit w/ mod I  8   Pt will demonstrate good attention and participation in continued evaluation to assess for DME needs upon discharge from acute care  9   Pt will complete grooming standing at sink w/ mod I after set- up

## 2018-01-02 NOTE — CASE MANAGEMENT
Initial Clinical Review    Admission: Date/Time/Statement: 12/31/17 @ 1534     Orders Placed This Encounter   Procedures    Inpatient Admission (expected length of stay for this patient is greater than two midnights)     Standing Status:   Standing     Number of Occurrences:   1     Order Specific Question:   Admitting Physician     Answer:   Thien Gutierrez [[id number]]     Order Specific Question:   Level of Care     Answer:   Med Surg [16]     Order Specific Question:   Estimated length of stay     Answer:   More than 2 Midnights     Order Specific Question:   Certification     Answer:   I certify that inpatient services are medically necessary for this patient for a duration of greater than two midnights  See H&P and MD Progress Notes for additional information about the patient's course of treatment  ED: Date/Time/Mode of Arrival:   ED Arrival Information     Expected Arrival Acuity Means of Arrival Escorted By Service Admission Type    - 12/31/2017 11:02 Urgent Ambulance [facility] General Medicine Urgent    Arrival Complaint    -          Chief Complaint:   Chief Complaint   Patient presents with    Cough     cough x1wk, no other complaints       History of Illness: 80 y o  female significant past medical history of COPD, Atrial  fibrillation, HTN, who was brought today by his granddaughter because patient has been complaining short of breath which is getting worse for the last 24 hours, patient reported it started 3 days ago reason why she went to the PCP who prescribed her amoxicillin but patient did not improved reason why the grandson brought her here to the ED patient reports short of breath accompanied with dry cough and wheezing  Patient has been in contact with  which is in a nursing home with the flu  Patient denied any chest pain, palpitation, abdominal pain, urinary changes   ADLS independent     ED Vital Signs:   ED Triage Vitals   Temperature Pulse Respirations Blood Pressure SpO2   12/31/17 1136 12/31/17 1115 12/31/17 1115 12/31/17 1115 12/31/17 1115   98 4 °F (36 9 °C) 63 18 (!) 236/103 95 %      Temp Source Heart Rate Source Patient Position - Orthostatic VS BP Location FiO2 (%)   12/31/17 1136 12/31/17 1115 12/31/17 1115 12/31/17 1115 --   Oral Monitor Lying Right arm       Pain Score       12/31/17 1115       No Pain        Wt Readings from Last 1 Encounters:   12/31/17 51 8 kg (114 lb 3 2 oz)       Vital Signs (abnormal): /103- 197/85    Abnormal Labs/Diagnostic Test Results:   influ a detected  ast 48  CxR-    Stable cardiomegaly  2   Blunting of the left costophrenic angle compatible with small effusion versus scarring  ED Treatment: oxygen 3 liters  Medication Administration from 12/31/2017 1102 to 12/31/2017 1632       Date/Time Order Dose Route Action Action by Comments     12/31/2017 1229 albuterol inhalation solution 10 mg 10 mg Nebulization Given Jensen Pruitt, RT      12/31/2017 1229 ipratropium (ATROVENT) 0 02 % inhalation solution 1 mg 1 mg Nebulization Given Jensen Pruitt, RT      12/31/2017 1230 sodium chloride 0 9 % inhalation solution 3 mL 3 mL Nebulization Given Jensen Pruitt, RT      12/31/2017 1615 predniSONE tablet 40 mg 40 mg Oral Given Aleks Felix RN      12/31/2017 1615 methylPREDNISolone sodium succinate (Solu-MEDROL) injection 40 mg 40 mg Intravenous Given Aleks Felix RN      12/31/2017 1615 levofloxacin (LEVAQUIN) tablet 500 mg 500 mg Oral Given Aleks Felix RN           Past Medical/Surgical History:    Active Ambulatory Problems     Diagnosis Date Noted    No Active Ambulatory Problems     Resolved Ambulatory Problems     Diagnosis Date Noted    No Resolved Ambulatory Problems     Past Medical History:   Diagnosis Date    Arrhythmia     Asthma     [facility]     COPD (chronic obstructive pulmonary disease) (HCC)     Dementia     Depression     GERD (gastroesophageal reflux disease)     History of heart artery stent     Hyperlipidemia     Hypertension        Admitting Diagnosis: Weakness [R53 1]  COPD exacerbation (HCC) [J44 1]  Viral respiratory illness [J98 8, B97 89]    Age/Sex: 80 y o  female    Assessment/Plan:   COPD with acute exacerbation ([facility] [address] )   Assessment & Plan     Positive physical exam for acute COPD exacerbation  Negative chest x-ray  un documented fever,  Fail p o amoxicillin prescribed by PCP  Start on IV steroid  Schedule nebulizer   Incentive spirometry          Hyperlipidemia   Assessment & Plan     Continue statin       Chronic atrial fibrillation (HCC)   Assessment & Plan     Stable  Continue Eliquis   Continue digoxin  Continue beta-blocker       Essential hypertension   Assessment & Plan     Elevated  Start on home medication         Admission Orders:  12/31/2017  1535 INPATIENT   Scheduled Meds:   apixaban 2 5 mg Oral BID   atorvastatin 20 mg Oral Daily With Dinner   digoxin 125 mcg Oral Daily   donepezil 10 mg Oral HS   isosorbide mononitrate 30 mg Oral Daily   levofloxacin 500 mg Oral Q24H   methylPREDNISolone sodium succinate 40 mg Intravenous Q8H   metoprolol tartrate 25 mg Oral QAM   metoprolol tartrate 50 mg Oral QPM   mirtazapine 7 5 mg Oral HS   oseltamivir 30 mg Oral Q12H DANITA   pantoprazole 40 mg Oral Daily Before Breakfast     Continuous Infusions:   sodium chloride 20 mL/hr Last Rate: 20 mL/hr (01/02/18 0981)     PRN Meds:   albuterol- not used    OTHER ORDERS: scds  Respiratory protocol- on simple face mask  PT/OT

## 2018-01-02 NOTE — PLAN OF CARE
Problem: DISCHARGE PLANNING - CARE MANAGEMENT  Goal: Discharge to post-acute care or home with appropriate resources  INTERVENTIONS:  - Conduct assessment to determine patient/family and health care team treatment goals, and need for post-acute services based on payer coverage, community resources, and patient preferences, and barriers to discharge  - Address psychosocial, clinical, and financial barriers to discharge as identified in assessment in conjunction with the patient/family and health care team  - Arrange appropriate level of post-acute services according to patients   needs and preference and payer coverage in collaboration with the physician and health care team  - Communicate with and update the patient/family, physician, and health care team regarding progress on the discharge plan  - Arrange appropriate transportation to post-acute venues  Outcome: Progressing  Referral sent to [facility], where pt's  is a resident

## 2018-01-02 NOTE — ASSESSMENT & PLAN NOTE
· Failed oral outpatient amoxicillin prescribed by PCP  · Still with bronchospasms today but improved overall  · Respiratory protocol  · Continue IV steroids but will decrease the amount and frequency   · Schedule nebulizer   · Incentive spirometry

## 2018-01-02 NOTE — PHYSICAL THERAPY NOTE
PT Evaluation (15min)  (7:55-8:10)    Past Medical History:   Diagnosis Date    Arrhythmia     Asthma     Atrial fibrillation (HCC)     COPD (chronic obstructive pulmonary disease) (HCC)     Dementia     Depression     GERD (gastroesophageal reflux disease)     History of heart artery stent     Hyperlipidemia     Hypertension         01/02/18 0810   Note Type   Note type Eval/Treat   Pain Assessment   Pain Assessment No/denies pain   Home Living   Type of [facility] One level;Stairs to enter without rails  (2-3 REINIER)   [address] chair   Home Equipment Other (Comment)  (rollator)   Prior Function   Level of [place] Independent with ADLs and functional mobility   Lives With Alone  (spouse currently lives at nursing home)   [address] [facility] in the last 6 months 0   Vocational Retired   Comments (-) drive   Restrictions/Precautions   Other Precautions Multiple lines;O2;Fall Risk;Cognitive; Chair Alarm; Bed Alarm;Droplet precautions   General   Additional Pertinent History pt presents to [facility] c SOB + cough  dx: COPD exacerbation  currently on 1L O2  PT consulted for mobility + d/c planning  up + OOB  Family/Caregiver Present No   Cognition   Orientation Level Oriented to person   RUE Assessment   RUE Assessment WFL   LUE Assessment   LUE Assessment WFL   RLE Assessment   RLE Assessment WFL  (4-/5)   LLE Assessment   LLE Assessment WFL  (4-/5)   Coordination   Sensation WFL   Transfers   Stand to Sit 5  Supervision   Additional items Armrests; Verbal cues   Ambulation/Elevation   Gait pattern Narrow DHARA; Decreased foot clearance   Gait Assistance 5  Supervision   Additional items Verbal cues   Assistive Device None   Distance 20'   Balance   Static Sitting Good   Dynamic Sitting Good   Static Standing Fair   Dynamic Standing Fair   Ambulatory Fair   Endurance Deficit   Endurance Deficit Yes   Endurance Deficit Description requires 1L O2   Activity Tolerance Activity Tolerance Patient limited by fatigue   Nurse Made Aware Agustín Luevano   Assessment   Prognosis Good   Problem List Decreased strength; Impaired balance;Decreased endurance;Decreased mobility; Decreased cognition;Decreased safety awareness   Assessment pt is an 89y/o f who presents to [facility] c COPD exacerbation  currently on droplet precautions to r/o influenza  also on 1L O2 which pt does not require at bseline  PMH significant for HTN, a-fib, hyperlipdemia, + dementia  at baseline, pt lives alone in ranch style home c 2-3 REINIER  pt's spouse currently residing at [facility] s/p fall  pt reports being (I) c functional mobility  oriented only to person during PT session  curerntly presents c deficits in strength, balance, gait quality, cognition + activity tolerance noted in PT exam above  Barthel Index 55/100  ambulated 20' c (S) c min verbal cues for safety 2* multiple lines  able to sit OOB in chair at end of session c chair alarm activated  would benefit from skilled PT to maximize functional mobility + improve quality of life  upon d/c, recommend STR 2* pt living alone + high fall risk  PT eval of high complexity 2* unstable med status c pt requiring ongoing medical management 2* COPD exacerbation  remains on droplet precautions + 1 L O2  presents c multiple co-morbidities + mobility deficits noted above  pt lives alone in ranch home c 2-3 REINIER + at high risk for falls  Barriers to Discharge Inaccessible home environment;Decreased caregiver support   Goals   Patient Goals "to go home"  [facility] Expiration Date 01/12/18   Short Term Goal #1 1  increase strength 1/2 grade to improve overall functional mobility, 2  perform transfers safely mod (I) to perform ADLs, 3  ambulate 300' mod (I)/(I) c no or least restrictive AD to safely navigate home environment, 4  negotiate 2 stairs mod (I) to safely enter home   Plan   Treatment/Interventions Functional transfer training;LE strengthening/ROM; Elevations; Therapeutic exercise; Endurance training;Patient/family training;Equipment eval/education; Bed mobility;Gait training;Spoke to nursing;OT   PT Frequency 5x/wk   Recommendation   Recommendation Short-term skilled PT   PT - OK to Discharge Yes  (to STR )   Barthel Index   Feeding 10   Bathing 0   Grooming Score 5   Dressing Score 5   Bladder Score 10   Bowels Score 10   Toilet Use Score 5   Transfers (Bed/Chair) Score 10   Mobility (Level Surface) Score 0   Stairs Score 0   Barthel Index Score 55     Candice Barrientos, PT

## 2018-01-02 NOTE — PROGRESS NOTES
Progress Note - Donnell 1/15/1928, 80 y o  female MRN: [id number]    Unit/Bed#: -01 Encounter: [id number]    Primary Care Provider: Yoli Valadez MD   Date and time admitted to hospital: 12/31/2017 11:02 AM        * COPD with acute exacerbation ([facility] [address] )   Assessment & Plan    · Failed oral outpatient amoxicillin prescribed by PCP  · Still with bronchospasms today but improved overall  · Respiratory protocol  · Continue IV steroids but will decrease the amount and frequency   · Schedule nebulizer   · Incentive spirometry          Influenza A H1N1 infection   Assessment & Plan    · Tamiflu started yesterday   · supportive care         Chronic atrial fibrillation (HCC)   Assessment & Plan    · Stable  · Continue Eliquis   · Continue current medical treatment         Essential hypertension   Assessment & Plan    · Continue blood pressure medications as outlined  · Blood pressure is acceptable         Hyperlipidemia   Assessment & Plan    · Continue statin          VTE Pharmacologic Prophylaxis:   Pharmacologic: Apixaban (Eliquis)  Mechanical VTE Prophylaxis in Place: Yes    Patient Centered Rounds: I have performed bedside rounds with nursing staff today  Discussions with Specialists or Other Care Team Provider:  Primary RN and case management    Education and Discussions with Family / Patient:  Patient and her grandson    Time Spent for Care: 30 minutes  More than 50% of total time spent on counseling and coordination of care as described above  Current Length of Stay: 2 day(s)    Current Patient Status: Inpatient   Certification Statement: The patient will continue to require additional inpatient hospital stay due to Bronchospasm and influenza  Discharge Plan / Estimated Discharge Date:  Hopefully within 48 hours    Code Status: Level 1 - Full Code      Subjective:   "Can't wait to home "  Offers no complaints of chest pain or shortness of breath  No nausea vomiting diarrhea constipation  Breathing is better  Objective:     Vitals:   Temp (24hrs), Av 9 °F (36 6 °C), Min:97 7 °F (36 5 °C), Max:98 °F (36 7 °C)    HR:  [] 83  Resp:  [17-18] 17  BP: (159-183)/(65-84) 183/76  SpO2:  [94 %-95 %] 95 %  Body mass index is 22 3 kg/m²  Input and Output Summary (last 24 hours): Intake/Output Summary (Last 24 hours) at 18 1455  Last data filed at 18 0925   Gross per 24 hour   Intake              960 ml   Output                0 ml   Net              960 ml       Physical Exam:     Physical Exam   Constitutional: She appears well-nourished  No distress  HENT:   Head: Normocephalic  Eyes: Pupils are equal, round, and reactive to light  Neck: Normal range of motion  Neck supple  Cardiovascular: Normal rate and regular rhythm  Pulmonary/Chest: Effort normal and breath sounds normal  No respiratory distress  Abdominal: Soft  Bowel sounds are normal    Musculoskeletal: Normal range of motion  She exhibits no edema  Neurological: She is alert  Skin: Skin is warm and dry  Psychiatric: She has a normal mood and affect  Her behavior is normal  Thought content normal    Nursing note and vitals reviewed          Additional Data:     Labs:      Results from last 7 days  Lab Units 18  0603 17  1134   WBC Thousand/uL 3 55* 7 70   HEMOGLOBIN g/dL 14 1 14 3   HEMATOCRIT % 45 0 44 3   PLATELETS Thousands/uL 290 258   NEUTROS PCT %  --  69   LYMPHS PCT %  --  19   MONOS PCT %  --  12   EOS PCT %  --  0       Results from last 7 days  Lab Units 18  0603 17  1134   SODIUM mmol/L 139 139   POTASSIUM mmol/L 3 9 4 0   CHLORIDE mmol/L 101 100   CO2 mmol/L 28 29   BUN mg/dL 19 23   CREATININE mg/dL 0 98 1 03   CALCIUM mg/dL 8 8 9 0   TOTAL PROTEIN g/dL  --  7 6   BILIRUBIN TOTAL mg/dL  --  1 00   ALK PHOS U/L  --  104   ALT U/L  --  48   AST U/L  --  48*   GLUCOSE RANDOM mg/dL 149* 120       Results from last 7 days  Lab Units 17  1134   INR  1 04       * I Have Reviewed All Lab Data Listed Above  Recent Cultures (last 7 days):       Results from last 7 days  Lab Units 12/31/17  1600   INFLUENZA A PCR  Detected*   INFLUENZA B PCR  None Detected   RSV PCR  None Detected       Last 24 Hours Medication List:     apixaban 2 5 mg Oral BID   atorvastatin 20 mg Oral Daily With Dinner   digoxin 125 mcg Oral Daily   donepezil 10 mg Oral HS   isosorbide mononitrate 30 mg Oral Daily   levofloxacin 500 mg Oral Q24H   methylPREDNISolone sodium succinate 40 mg Intravenous Q8H   metoprolol tartrate 25 mg Oral QAM   metoprolol tartrate 50 mg Oral QPM   mirtazapine 7 5 mg Oral HS   oseltamivir 30 mg Oral Q12H DANITA   pantoprazole 40 mg Oral Daily Before Breakfast        Today, Patient Was Seen By: JUAN Hewitt    ** Please Note: Dragon 360 Dictation voice to text software may have been used in the creation of this document   **

## 2018-01-02 NOTE — PHYSICAL THERAPY NOTE
PT Progress Note (12min)  (8:10-8:22)       01/02/18 0822   Pain Assessment   Pain Assessment No/denies pain   Restrictions/Precautions   Other Precautions Droplet precautions; Chair Alarm; Bed Alarm;Cognitive;Multiple lines;O2;Fall Risk   General   Chart Reviewed Yes   Response to Previous Treatment Patient with no complaints from previous session  Family/Caregiver Present No   Cognition   Orientation Level Oriented to person   Subjective   Subjective pt agreeable to PT session  "I'm looking for my clothes that my grandson brought me"  Transfers   Sit to Stand 5  Supervision   Additional items Armrests; Verbal cues   Stand to Sit 5  Supervision   Additional items Armrests; Verbal cues   Ambulation/Elevation   Gait pattern Narrow DHARA; Decreased foot clearance   Gait Assistance 5  Supervision   Additional items Verbal cues   Assistive Device None   Distance 40'; limited by fatigue   Balance   Static Sitting Good   Dynamic Sitting Good   Static Standing Fair   Dynamic Standing Fair   Ambulatory Fair   Endurance Deficit   Endurance Deficit Yes   Endurance Deficit Description requires 1L O2   Activity Tolerance   Activity Tolerance Patient limited by fatigue   Nurse Made Aware [place]   Exercises   Knee AROM Long Arc Quad Sitting;10 reps;AROM; Bilateral   Marching Sitting;10 reps;AROM; Bilateral   Assessment   Prognosis Good   Problem List Decreased strength;Decreased endurance; Impaired balance;Decreased mobility; Decreased cognition;Decreased safety awareness; Impaired hearing   Assessment pt demonstrating progress c PT  progressed ambulation distance to 40' c (S)  cont to require min verbal cues for safety 2* multiple lines to avoid loss of balance  initiated B/L LE ther ex in sitting x10 reps c AROM  remained OOB in chair at end of session  will cont skilled PT to further maximize functional mobility + return to PLOF  upon d/c, recommend STR 2* pt living alone + impaired cognitive status increasing risk for falls  Barriers to Discharge Inaccessible home environment;Decreased caregiver support   Goals   Patient Goals "to go home"  [facility] Expiration Date 01/12/18   Treatment Day 1   Plan   Treatment/Interventions Functional transfer training;Elevations;LE strengthening/ROM; Therapeutic exercise; Endurance training;Patient/family training;Equipment eval/education; Bed mobility;Gait training;Spoke to nursing;OT   Progress Progressing toward goals   PT Frequency 5x/wk   Recommendation   Recommendation Short-term skilled PT   PT - OK to Discharge Yes  (to STR )     Ashwin Wick PT

## 2018-01-02 NOTE — PLAN OF CARE
Problem: PHYSICAL THERAPY ADULT  Goal: Performs mobility at highest level of function for planned discharge setting  See evaluation for individualized goals  Treatment/Interventions: Functional transfer training, LE strengthening/ROM, Elevations, Therapeutic exercise, Endurance training, Patient/family training, Equipment eval/education, Bed mobility, Gait training, Spoke to nursing, OT          See flowsheet documentation for full assessment, interventions and recommendations  Outcome: Progressing  Prognosis: Good  Problem List: Decreased strength, Decreased endurance, Impaired balance, Decreased mobility, Decreased cognition, Decreased safety awareness, Impaired hearing  Assessment: pt demonstrating progress c PT  progressed ambulation distance to 40' c (S)  cont to require min verbal cues for safety 2* multiple lines to avoid loss of balance  initiated B/L LE ther ex in sitting x10 reps c AROM  remained OOB in chair at end of session  will cont skilled PT to further maximize functional mobility + return to PLOF  upon d/c, recommend STR 2* pt living alone + impaired cognitive status increasing risk for falls  Barriers to Discharge: Inaccessible home environment, Decreased caregiver support     Recommendation: Short-term skilled PT     PT - OK to Discharge: Yes (to STR )    See flowsheet documentation for full assessment

## 2018-01-02 NOTE — SOCIAL WORK
LOS 2 days  Not a bundle pt at this time and not a readmission  CM was notified that pt's grandson, Bernadette Arvizu, is very involved and was interested in discussing STR  CM called Bernadette Arvizu  He reported that pt lives alone, as  lives at [address]  Her home is a ranch style home with a few steps to enter  Has shower chair and RW  She is indp in ADLs, but Bernadette Arvizu reported that she's been more and more forgetful  She has been forgetting to take her medication and will put things in the toaster oven and forget about them, etc  She does have dementia and he thinks it is getting worse  Bernadette Arvizu would pt to go to rehab, which may turn into long term placement, and prefers [facility] to keep her with her   Bernadette Arvizu did bring in copy of pt's Durable POA, which CM put in medical records bin to be added to pt chart  Bernadette Arvizu is in the area for the time being, but lives in [place]  CM sent referral to [facility] and will follow for remainder of dcp

## 2018-01-03 RX ADMIN — APIXABAN 2.5 MG: 2.5 TABLET, FILM COATED ORAL at 08:17

## 2018-01-03 RX ADMIN — LEVOFLOXACIN 500 MG: 500 TABLET, FILM COATED ORAL at 17:17

## 2018-01-03 RX ADMIN — METOPROLOL TARTRATE 25 MG: 25 TABLET ORAL at 08:17

## 2018-01-03 RX ADMIN — DONEPEZIL HYDROCHLORIDE 10 MG: 5 TABLET, FILM COATED ORAL at 21:49

## 2018-01-03 RX ADMIN — DIGOXIN 125 MCG: 125 TABLET ORAL at 08:17

## 2018-01-03 RX ADMIN — OSELTAMIVIR PHOSPHATE 30 MG: 30 CAPSULE ORAL at 08:18

## 2018-01-03 RX ADMIN — METHYLPREDNISOLONE SODIUM SUCCINATE 40 MG: 40 INJECTION, POWDER, FOR SOLUTION INTRAMUSCULAR; INTRAVENOUS at 08:17

## 2018-01-03 RX ADMIN — MIRTAZAPINE 7.5 MG: 15 TABLET, FILM COATED ORAL at 21:50

## 2018-01-03 RX ADMIN — METOPROLOL TARTRATE 50 MG: 50 TABLET ORAL at 17:16

## 2018-01-03 RX ADMIN — APIXABAN 2.5 MG: 2.5 TABLET, FILM COATED ORAL at 17:17

## 2018-01-03 RX ADMIN — PANTOPRAZOLE SODIUM 40 MG: 40 TABLET, DELAYED RELEASE ORAL at 06:08

## 2018-01-03 RX ADMIN — ISOSORBIDE MONONITRATE 30 MG: 30 TABLET, EXTENDED RELEASE ORAL at 08:17

## 2018-01-03 RX ADMIN — OSELTAMIVIR PHOSPHATE 30 MG: 30 CAPSULE ORAL at 21:50

## 2018-01-03 RX ADMIN — ATORVASTATIN CALCIUM 20 MG: 20 TABLET, FILM COATED ORAL at 17:17

## 2018-01-03 NOTE — PROGRESS NOTES
Patient oob in chair  VSS, no signs of distress  Offering no complaints at this time  Call bell and comfort items within reach  Will continue to monitor

## 2018-01-03 NOTE — PHYSICIAN ADVISOR
Current patient class: Inpatient  The patient is currently on Hospital Day: 4      The patient was admitted to the hospital at [phone] on 12/31/17 for the following diagnosis:  Weakness [R53 1]  COPD exacerbation ([address] ) [J44 1]  Viral respiratory illness [J98 8, B97 89]       There is documentation in the medical record of an expected length of stay of at least 2 midnights  The patient is therefore expected to satisfy the 2 midnight benchmark and given the 2 midnight presumption is appropriate for INPATIENT ADMISSION  Given this expectation of a satisfying stay, CMS instructs us that the patient is most often appropriate for inpatient admission under part A provided medical necessity is documented in the chart  After review of the relevant documentation, labs, vital signs and test results, the patient is appropriate for INPATIENT ADMISSION  Admission to the hospital as an inpatient is a complex decision making process which requires the practitioner to consider the patients presenting complaint, history and physical examination and all relevant testing  With this in mind, in this case, the patient was deemed appropriate for INPATIENT ADMISSION  After review of the documentation and testing available at the time of the admission I concur with this clinical determination of medical necessity  Rationale is as follows:     The patient is a 80 yrs old Female who presented to the ED at 12/31/2017 11:02 AM with a chief complaint of Cough (cough x1wk, no other complaints)    The patients vitals on arrival were ED Triage Vitals   Temperature Pulse Respirations Blood Pressure SpO2   12/31/17 1136 12/31/17 1115 12/31/17 1115 12/31/17 1115 12/31/17 1115   98 4 °F (36 9 °C) 63 18 (!) 236/103 95 %      Temp Source Heart Rate Source Patient Position - Orthostatic VS BP Location FiO2 (%)   12/31/17 1136 12/31/17 1115 12/31/17 1115 12/31/17 1115 --   Oral Monitor Lying Right arm       Pain Score       12/31/17 1115 No Pain           Past Medical History:   Diagnosis Date    Arrhythmia     Asthma     Atrial fibrillation ([facility] [address] )     COPD (chronic obstructive pulmonary disease) (HCC)     Dementia     Depression     GERD (gastroesophageal reflux disease)     History of heart artery stent     Hyperlipidemia     Hypertension      History reviewed  No pertinent surgical history          Consults have been placed to:   None    Vitals:    01/01/18 2227 01/02/18 0755 01/02/18 1549 01/02/18 2357   BP: 159/65 (!) 183/76 160/84 150/68   Pulse: 80 83 84 66   Resp: 18 17 18 18   Temp: 97 7 °F (36 5 °C) 97 9 °F (36 6 °C) 97 6 °F (36 4 °C) 97 8 °F (36 6 °C)   TempSrc: Oral Oral Oral Oral   SpO2: 94% 95% 98% 96%   Weight:       Height:           Most recent labs:    Recent Labs      12/31/17   1134  01/01/18   0603   WBC  7 70  3 55*   HGB  14 3  14 1   HCT  44 3  45 0   PLT  258  290   K  4 0  3 9   NA  139  139   CALCIUM  9 0  8 8   BUN  23  19   CREATININE  1 03  0 98   INR  1 04   --    TROPONINI  0 02   --    AST  48*   --    ALT  48   --    ALKPHOS  104   --    BILITOT  1 00   --        Scheduled Meds:  apixaban 2 5 mg Oral BID   atorvastatin 20 mg Oral Daily With Dinner   digoxin 125 mcg Oral Daily   donepezil 10 mg Oral HS   isosorbide mononitrate 30 mg Oral Daily   levofloxacin 500 mg Oral Q24H   methylPREDNISolone sodium succinate 40 mg Intravenous Daily   metoprolol tartrate 25 mg Oral QAM   metoprolol tartrate 50 mg Oral QPM   mirtazapine 7 5 mg Oral HS   oseltamivir 30 mg Oral Q12H DANITA   pantoprazole 40 mg Oral Daily Before Breakfast     Continuous Infusions:   PRN Meds:   acetaminophen    albuterol    Surgical procedures (if appropriate):

## 2018-01-03 NOTE — PROGRESS NOTES
Progress Note - [place] 1/15/1928, 80 y o  female MRN: [id number]    Unit/Bed#: -01 Encounter: [id number]    Primary Care Provider: Rogelio Harry MD   Date and time admitted to hospital: 12/31/2017 11:02 AM        * COPD with acute exacerbation ([facility] [address] )   Assessment & Plan    · Failed oral outpatient amoxicillin prescribed by PCP  · Still with bronchospasms today but improved overall  · Respiratory protocol  · Continue IV steroids but will decreased over past 24 hours   · Schedule nebulizer   · Incentive spirometry          Influenza A H1N1 infection   Assessment & Plan    · Tamiflu started, complete 5 day course  · supportive care         Chronic atrial fibrillation (HCC)   Assessment & Plan    · Stable  · Continue Eliquis   · Continue current medical treatment         Essential hypertension   Assessment & Plan    · Continue blood pressure medications as outlined  · Blood pressure is acceptable         Hyperlipidemia   Assessment & Plan    · Continue statin          VTE Pharmacologic Prophylaxis:   Pharmacologic: Apixaban (Eliquis)  Mechanical VTE Prophylaxis in Place: Yes    Patient Centered Rounds: I have performed bedside rounds with nursing staff today  Discussions with Specialists or Other Care Team Provider:  Primary RN case manager    Education and Discussions with Family / Patient:  Patient, her grandson Micah Benoit has been updated thoroughly    Time Spent for Care: 20 minutes  More than 50% of total time spent on counseling and coordination of care as described above  Current Length of Stay: 3 day(s)    Current Patient Status: Inpatient   Certification Statement: The patient will continue to require additional inpatient hospital stay due to Bronchospasms secondary to COPD exacerbation caused by the flu  Discharge Plan / Estimated Discharge Date: Within the next 24 hours once a rehab bed is found      Code Status: Level 1 - Full Code      Subjective:   Offers no complaints    Overall feels better  Just wants to go home  No overnight events reported by nurse  She denies chest pain when asked  Ate her breakfast without any difficulty  No abdominal pain  Objective:     Vitals:   Temp (24hrs), Av 1 °F (36 7 °C), Min:97 6 °F (36 4 °C), Max:99 °F (37 2 °C)    HR:  [66-85] 85  Resp:  [15-18] 15  BP: (150-160)/(68-84) 158/78  SpO2:  [95 %-98 %] 95 %  Body mass index is 22 3 kg/m²  Input and Output Summary (last 24 hours): Intake/Output Summary (Last 24 hours) at 18 1127  Last data filed at 18 6453   Gross per 24 hour   Intake              120 ml   Output                0 ml   Net              120 ml       Physical Exam:     Physical Exam   Constitutional: She appears well-nourished  Out of bed to chair  HENT:   Head: Normocephalic and atraumatic  Neck: Normal range of motion  Neck supple  Cardiovascular: Normal rate and regular rhythm  Pulmonary/Chest: Effort normal  No respiratory distress  She has wheezes  Wheezing has improved however there are scant wheezes present  She is now off oxygen  Abdominal: Soft  Bowel sounds are normal    Musculoskeletal: Normal range of motion  She exhibits no edema  Neurological: She is alert  Short-term memory loss and periods of forgetfulness  She has been told she is going to rehab at discharge yet forgets and states she is going home  Skin: Skin is warm and dry  Psychiatric: She has a normal mood and affect  Her behavior is normal  Thought content normal    Nursing note and vitals reviewed          Additional Data:     Labs:      Results from last 7 days  Lab Units 18  0603 17  1134   WBC Thousand/uL 3 55* 7 70   HEMOGLOBIN g/dL 14 1 14 3   HEMATOCRIT % 45 0 44 3   PLATELETS Thousands/uL 290 258   NEUTROS PCT %  --  69   LYMPHS PCT %  --  19   MONOS PCT %  --  12   EOS PCT %  --  0       Results from last 7 days  Lab Units 18  0603 17  1134   SODIUM mmol/L 139 139   POTASSIUM mmol/L 3 9 4  0   CHLORIDE mmol/L 101 100   CO2 mmol/L 28 29   BUN mg/dL 19 23   CREATININE mg/dL 0 98 1 03   CALCIUM mg/dL 8 8 9 0   TOTAL PROTEIN g/dL  --  7 6   BILIRUBIN TOTAL mg/dL  --  1 00   ALK PHOS U/L  --  104   ALT U/L  --  48   AST U/L  --  48*   GLUCOSE RANDOM mg/dL 149* 120       Results from last 7 days  Lab Units 12/31/17  1134   INR  1 04       * I Have Reviewed All Lab Data Listed Above  *  Recent Cultures (last 7 days):       Results from last 7 days  Lab Units 12/31/17  1600   INFLUENZA A PCR  Detected*   INFLUENZA B PCR  None Detected   RSV PCR  None Detected       Last 24 Hours Medication List:     apixaban 2 5 mg Oral BID   atorvastatin 20 mg Oral Daily With Dinner   digoxin 125 mcg Oral Daily   donepezil 10 mg Oral HS   isosorbide mononitrate 30 mg Oral Daily   levofloxacin 500 mg Oral Q24H   methylPREDNISolone sodium succinate 40 mg Intravenous Daily   metoprolol tartrate 25 mg Oral QAM   metoprolol tartrate 50 mg Oral QPM   mirtazapine 7 5 mg Oral HS   oseltamivir 30 mg Oral Q12H DANITA   pantoprazole 40 mg Oral Daily Before Breakfast        Today, Patient Was Seen By: JUAN Daniel    ** Please Note: Dragon 360 Dictation voice to text software may have been used in the creation of this document   **

## 2018-01-03 NOTE — PLAN OF CARE
Problem: Potential for Falls  Goal: Patient will remain free of falls  INTERVENTIONS:  - Assess patient frequently for physical needs  -  Identify cognitive and physical deficits and behaviors that affect risk of falls    -  Bowman fall precautions as indicated by assessment   - Educate patient/family on patient safety including physical limitations  - Instruct patient to call for assistance with activity based on assessment  - Modify environment to reduce risk of injury  - Consider OT/PT consult to assist with strengthening/mobility   Outcome: Progressing      Problem: PAIN - ADULT  Goal: Verbalizes/displays adequate comfort level or baseline comfort level  Interventions:  - Encourage patient to monitor pain and request assistance  - Assess pain using appropriate pain scale  - Administer analgesics based on type and severity of pain and evaluate response  - Implement non-pharmacological measures as appropriate and evaluate response  - Consider cultural and social influences on pain and pain management  - Notify physician/advanced practitioner if interventions unsuccessful or patient reports new pain   Outcome: Progressing      Problem: INFECTION - ADULT  Goal: Absence or prevention of progression during hospitalization  INTERVENTIONS:  - Assess and monitor for signs and symptoms of infection  - Monitor lab/diagnostic results  - Monitor all insertion sites, i e  indwelling lines, tubes, and drains  - Monitor endotracheal (as able) and nasal secretions for changes in amount and color  - Bowman appropriate cooling/warming therapies per order  - Administer medications as ordered  - Instruct and encourage patient and family to use good hand hygiene technique  - Identify and instruct in appropriate isolation precautions for identified infection/condition   Outcome: Progressing

## 2018-01-03 NOTE — ASSESSMENT & PLAN NOTE
· Failed oral outpatient amoxicillin prescribed by PCP  · Still with bronchospasms today but improved overall  · Respiratory protocol  · Continue IV steroids but will decreased over past 24 hours   · Schedule nebulizer   · Incentive spirometry

## 2018-01-04 VITALS
HEIGHT: 60 IN | BODY MASS INDEX: 22.42 KG/M2 | SYSTOLIC BLOOD PRESSURE: 154 MMHG | RESPIRATION RATE: 19 BRPM | HEART RATE: 64 BPM | TEMPERATURE: 98 F | WEIGHT: 114.2 LBS | DIASTOLIC BLOOD PRESSURE: 70 MMHG | OXYGEN SATURATION: 94 %

## 2018-01-04 RX ORDER — PREDNISONE 10 MG/1
20 TABLET ORAL DAILY
Qty: 4 TABLET | Refills: 0 | Status: SHIPPED | OUTPATIENT
Start: 2018-01-07 | End: 2018-01-09

## 2018-01-04 RX ORDER — PREDNISONE 10 MG/1
40 TABLET ORAL DAILY
Qty: 8 TABLET | Refills: 0 | Status: SHIPPED | OUTPATIENT
Start: 2018-01-04 | End: 2018-01-06

## 2018-01-04 RX ORDER — OSELTAMIVIR PHOSPHATE 30 MG/1
30 CAPSULE ORAL EVERY 12 HOURS SCHEDULED
Qty: 3 CAPSULE | Refills: 0 | Status: SHIPPED | OUTPATIENT
Start: 2018-01-04 | End: 2018-01-06

## 2018-01-04 RX ORDER — PREDNISONE 10 MG/1
10 TABLET ORAL DAILY
Qty: 2 TABLET | Refills: 0 | Status: SHIPPED | OUTPATIENT
Start: 2018-01-10 | End: 2018-01-12

## 2018-01-04 RX ADMIN — ISOSORBIDE MONONITRATE 30 MG: 30 TABLET, EXTENDED RELEASE ORAL at 09:23

## 2018-01-04 RX ADMIN — APIXABAN 2.5 MG: 2.5 TABLET, FILM COATED ORAL at 09:24

## 2018-01-04 RX ADMIN — DIGOXIN 125 MCG: 125 TABLET ORAL at 09:24

## 2018-01-04 RX ADMIN — METOPROLOL TARTRATE 25 MG: 25 TABLET ORAL at 09:24

## 2018-01-04 RX ADMIN — METHYLPREDNISOLONE SODIUM SUCCINATE 40 MG: 40 INJECTION, POWDER, FOR SOLUTION INTRAMUSCULAR; INTRAVENOUS at 09:23

## 2018-01-04 RX ADMIN — PANTOPRAZOLE SODIUM 40 MG: 40 TABLET, DELAYED RELEASE ORAL at 06:50

## 2018-01-04 RX ADMIN — OSELTAMIVIR PHOSPHATE 30 MG: 30 CAPSULE ORAL at 09:24

## 2018-01-04 NOTE — PLAN OF CARE
DISCHARGE PLANNING     Discharge to home or other facility with appropriate resources Adequate for Discharge        INFECTION - ADULT     Absence or prevention of progression during hospitalization Adequate for Discharge        Knowledge Deficit     Patient/family/caregiver demonstrates understanding of disease process, treatment plan, medications, and discharge instructions Adequate for Discharge        PAIN - ADULT     Verbalizes/displays adequate comfort level or baseline comfort level Adequate for Discharge        Potential for Falls     Patient will remain free of falls Adequate for Discharge        RESPIRATORY - ADULT     Achieves optimal ventilation and oxygenation Adequate for Discharge        SAFETY ADULT     Maintain or return to baseline ADL function Adequate for Discharge     Maintain or return mobility status to optimal level Adequate for Discharge

## 2018-01-04 NOTE — DISCHARGE SUMMARY
Discharge- [place] 1/15/1928, 80 y o  female MRN: [id number]    Unit/Bed#: -01 Encounter: [id number]    Primary Care Provider: Bianca Archibald MD   Date and time admitted to hospital: 12/31/2017 11:02 AM        * COPD with acute exacerbation ([facility] )   Assessment & Plan    · Failed oral outpatient amoxicillin prescribed by PCP  · Required IV steroids for bronchospasm but will transition to a quick oral taper for discharge  · Respiratory protocol  · Scheduled nebulizer   · Incentive spirometry          Influenza A H1N1 infection   Assessment & Plan    · Tamiflu started, complete 5 day course- Will end on January 6, 2018  ·  supportive care         Chronic atrial fibrillation ([facility] )   Assessment & Plan    · Stable  · Continue Eliquis   · Continue current medical treatment         Essential hypertension   Assessment & Plan    · Continue blood pressure medications as outlined  · Blood pressure is acceptable         Hyperlipidemia   Assessment & Plan    · Continue statin          Discharging Physician / Practitioner: Devonte Booth [address] Tamm[address] Edmonds  PCP: Bianca Archibald MD  Admission Date: 12/31/2017  Discharge Date: 01/04/18    Disposition:      Short Term Rehab or SNF at [place] [id number] (see below)    For Discharges to [facility] SNF:   · [address], DO - sent a tiger text to the [facility] physician  Reason for Admission:  Cough and shortness of breath    Consultations During Hospital Stay:  · PT and OT    Procedures Performed:     · None    Medication Adjustments and Discharge Medications:  · Summary of Medication Adjustments made as a result of this hospitalization:  Prednisone taper, Tamiflu for 2 more days  Date of completion January 6  · Medication Dosing Tapers - Please refer to Discharge Medication List for details on any medication dosing tapers (if applicable to patient)    · Medications being temporarily held (include recommended restart time):  None  · Discharge Medication List: See after visit summary for reconciled discharge medications  Wound Care Recommendations:  When applicable, please see wound care section of After Visit Summary  Diet Recommendations at Discharge:  Diet -        Diet Orders            Start     Ordered    12/31/17 1740  Room Service  Once     Question:  Type of Service  Answer:  Room Service - Appropriate with Assistance    12/31/17 1739    12/31/17 1530  Diet Regular; Regular House  Diet effective now     Question Answer Comment   Diet Type Regular    Regular Regular House    RD to adjust diet per protocol? Yes        12/31/17 1529          Instructions for any Catheters / Lines Present at Discharge (including removal date, if applicable):  No lines or catheters    Significant Findings / Test Results:     X-ray chest 2 views   ED Interpretation by Donald Wray MD (12/31 1250)   No change from previous      Final Result by Julisa Collins MD (12/31 1511)   1  Stable cardiomegaly  2   Blunting of the left costophrenic angle compatible with small effusion versus scarring  Workstation performed: [id number]           ·     Incidental Findings:   · None    Test Results Pending at Discharge (will require follow up): · None     Outpatient Tests Requested:  · None    Complications:  None    Hospital Course:     [place] is a 80 y o  female patient who originally presented to the hospital on 12/31/2017 due to cough and difficulty breathing  Patient was found have a bronchospasms secondary to influenza a  She was started on nebulizers, IV steroids, Tamiflu and Levaquin as she failed outpatient antibiotics  She had no pneumonia on exam or chest x-ray  She was afebrile  She will be taken off oral antibiotics and tapered quickly on p o  prednisone  Her Tamiflu will continue for 2 more days and stop on January 6, 2018    She is being transferred to [facility] for acute rehab but will likely be transitioned to long-term care as she has cognitive decline and her family does not want her living alone  Condition at Discharge: stable     Discharge Day Visit / Exam:     Subjective:  "Feels still very weak today "  Denies chest pain and breathing overall feels better  She is normally up and active at home and cannot believe how weeks she continues to feel  She has no abdominal pain, nausea, vomiting or chest pain  She has no fever or chills  And she had no overnight events  Vitals: Blood Pressure: 154/70 (01/04/18 0812)  Pulse: 64 (01/04/18 0812)  Temperature: 98 °F (36 7 °C) (01/04/18 0812)  Temp Source: Oral (01/04/18 2938)  Respirations: 19 (01/04/18 0812)  Height: 5' (152 4 cm) (12/31/17 1713)  Weight - Scale: 51 8 kg (114 lb 3 2 oz) (12/31/17 1713)  SpO2: 94 % (01/04/18 4035)  Exam:   Physical Exam   Constitutional: She is oriented to person, place, and time  She appears well-nourished  HENT:   Head: Normocephalic  Eyes: Pupils are equal, round, and reactive to light  Neck: Normal range of motion  Neck supple  Cardiovascular: Normal rate and regular rhythm  No murmur heard  Pulmonary/Chest: Effort normal and breath sounds normal  No respiratory distress  She has no wheezes  Abdominal: Soft  Bowel sounds are normal    Musculoskeletal: Normal range of motion  Neurological: She is alert and oriented to person, place, and time  Skin: Skin is warm and dry  Psychiatric: She has a normal mood and affect  Her behavior is normal  Thought content normal    Nursing note and vitals reviewed  Discussion with Family:  Grandson updated    Goals of Care Discussions:  · Code Status at Discharge: Level 1 - Full Code  · Were there any Goals of Care Discussions during Hospitalization?: No  · Results of any General Goals of Care Discussions:  Discussed that the patient will likely not be able to return home given her cognitive decline  Family is in agreement  Patient just wants to go home      · POLST Completed: No · If POLST Completed, Summary of POLST Agreement Provided Here:  No   · OK to Rehospitalize if Needed? Yes    Discharge instructions/Information to patient and family:   See after visit summary section titled Discharge Instructions for information provided to patient and family  Planned Readmission:  Not anticipated      Discharge Statement:  I spent 40 minutes discharging the patient  This time was spent on the day of discharge  I had direct contact with the patient on the day of discharge  Greater than 50% of the total time was spent examining patient, answering all patient questions, arranging and discussing plan of care with patient as well as directly providing post-discharge instructions  Additional time then spent on discharge activities      ** Please Note: This note has been constructed using a voice recognition system **

## 2018-01-04 NOTE — ASSESSMENT & PLAN NOTE
· Failed oral outpatient amoxicillin prescribed by PCP  · Required IV steroids for bronchospasm but will transition to a quick oral taper for discharge    · Respiratory protocol  · Scheduled nebulizer   · Incentive spirometry

## 2018-01-04 NOTE — SOCIAL WORK
Patient is medically stable for discharge to [facility] today  CM spoke with patient's emergency contact and reviewed DCP  [facility] transportation will be set up and he is aware of the out of pocket cost and agreeable  CM reviewed BPCI notice and the medicare bundle  IMM was reviewed with him  [address] transport was set up with  EMS for 2:00 PM   CM reviewed with patient that she will be followed by an outpatient care coordinator to assist with her care coordination after she leaves the hospital   CM reviewed the availability of treatment team to discuss questions or concerns patient and/or family may have regarding  understanding medications and recognizing signs and symptoms once discharged  CM also encouraged patient to follow up with all recommended appointments after discharge  Patient advised of importance for patient and family to participate in managing patients medical well being

## 2018-01-04 NOTE — PLAN OF CARE
Problem: DISCHARGE PLANNING - CARE MANAGEMENT  Goal: Discharge to post-acute care or home with appropriate resources  INTERVENTIONS:  - Conduct assessment to determine patient/family and health care team treatment goals, and need for post-acute services based on payer coverage, community resources, and patient preferences, and barriers to discharge  - Address psychosocial, clinical, and financial barriers to discharge as identified in assessment in conjunction with the patient/family and health care team  - Arrange appropriate level of post-acute services according to patients   needs and preference and payer coverage in collaboration with the physician and health care team  - Communicate with and update the patient/family, physician, and health care team regarding progress on the discharge plan  - Arrange appropriate transportation to post-acute venues   Outcome: Completed Date Met: 01/04/18  Patient is medically stable for discharge to [facility] today  CM spoke with patient's emergency contact and reviewed DCP  [facility] transportation will be set up and he is aware of the out of pocket cost and agreeable  CM reviewed BPCI notice and the medicare bundle  IMM was reviewed with him  [address] transport was set up with  EMS for 2:00 PM   CM reviewed with patient that she will be followed by an outpatient care coordinator to assist with her care coordination after she leaves the hospital   CM reviewed the availability of treatment team to discuss questions or concerns patient and/or family may have regarding  understanding medications and recognizing signs and symptoms once discharged  CM also encouraged patient to follow up with all recommended appointments after discharge  Patient advised of importance for patient and family to participate in managing patients medical well being

## 2018-01-14 VITALS
HEART RATE: 72 BPM | DIASTOLIC BLOOD PRESSURE: 76 MMHG | SYSTOLIC BLOOD PRESSURE: 166 MMHG | WEIGHT: 112 LBS | BODY MASS INDEX: 20.61 KG/M2 | HEIGHT: 62 IN

## 2018-01-14 VITALS
HEIGHT: 62 IN | RESPIRATION RATE: 16 BRPM | BODY MASS INDEX: 20.46 KG/M2 | WEIGHT: 111.19 LBS | DIASTOLIC BLOOD PRESSURE: 74 MMHG | SYSTOLIC BLOOD PRESSURE: 164 MMHG | HEART RATE: 84 BPM

## 2018-01-17 NOTE — PROCEDURES
Procedures by HERMILO Garcia at 10/6/2016  10:25 AM      Author:  HERMILO Garcia Service:  (none) Author Type:  Speech and Language Pathologist     Filed:  10/6/2016 12:23 PM Date of Service:  10/6/2016 10:25 AM Status:  Signed     :  HERMILO Garcia (Speech and Language Pathologist)         Pre-procedure Diagnoses:       1  Pharyngoesophageal dysphagia [R13 14]       2  Other symptoms and signs concerning food and fluid intake [R63 8]                Procedures:       1  FL BARIUM Breanna Lexie SPEECH [KNG185 (Custom)]                                                      Video Swallow Study      Patient Name: Adam Jeffries  [id number] Date: 10/6/2016  par  par  Past Medical History  Afib, CAD, s/p stent, CHF, mood, memory decline, dysphagia  Past Surgical History  No past surgical history on file  General Information:    79 yo female referred to [address] [place] [address]  for a VBS by Dr Lozada Organ for dysphagia w/  c/o coughing w/ liquids, especially water  Pt reported when she tries to drink a full glass  of water, it gets stuck, gurgles, and comes back up  Cognition:  Awake, alert, cooperative, interactive  Speech/Swallow Mech: Oral motor movements appeared  WNL; Dentition was upper plate and part natural w/ implants on lower gums; Respiratory Status: WFL on room air;   Current diet: regular w/ thin liquids  Prior VBS none  Pt stated she was  here for an UGI last week, but due to concern for aspiration w/ reports of dysphagia w/ liquids, radiologist suggested VBS  Pt was seen in radiology for a Video Barium Swallow Study, seated in the upright position and viewed laterally with the following consistencies: puree, soft, solid, HTL, NTL, thin liquids by cup and straw, and whole pill w/ water  Results are as follows:       Oral Stage:   adequate bolus retrieval, slow oral processing, decreased coordination w/ transfer of food and liquids  No oral residue                Pharyngeal Stage:   Swallow initiation was prompt w/ complete epiglottic and airway closure; no pharyngeal retention noted  Brief pill stasis in vallecula, but cleared w/o incident  Esophageal Stage:   Radiologist present to comment on findings: Full esophagus noted w/ successive sips of liquids; tertiary contractions, dysmotility, and intra-esophageal retropulsion noted to cervical esophagus  Mild stricture suspected at LES  Assessment Summary:   Mild oral dysphagia, pharyngeal stage appears WNL  Esophageal dysphagia w/ suspected mild stricture @ LES, also noted dysmotility and intra-esophageal retropulsion to cervical esophagus which increases risk for bottom-up aspiration       Diagnosis/Prognosis:            Recommendations:   cont regular diet as tolerated  meds as tolerated  Eat slowly, single sips of liquids  Consider GI consult- ? EGD/dilatation                             Received for:Provider  EPIC   Oct  6 2016 12:24PM Ethan Bakari Standard Time

## 2018-01-23 VITALS
RESPIRATION RATE: 8 BRPM | BODY MASS INDEX: 21.8 KG/M2 | SYSTOLIC BLOOD PRESSURE: 180 MMHG | DIASTOLIC BLOOD PRESSURE: 70 MMHG | HEIGHT: 60 IN | HEART RATE: 78 BPM | WEIGHT: 111.03 LBS

## 2018-02-16 ENCOUNTER — LAB REQUISITION (OUTPATIENT)
Dept: LAB | Facility: HOSPITAL | Age: 83
End: 2018-02-16
Payer: MEDICARE

## 2018-02-16 ENCOUNTER — OFFICE VISIT (OUTPATIENT)
Dept: CARDIOLOGY CLINIC | Facility: CLINIC | Age: 83
End: 2018-02-16
Payer: MEDICARE

## 2018-02-16 VITALS
HEIGHT: 60 IN | SYSTOLIC BLOOD PRESSURE: 140 MMHG | WEIGHT: 107 LBS | HEART RATE: 66 BPM | BODY MASS INDEX: 21.01 KG/M2 | DIASTOLIC BLOOD PRESSURE: 66 MMHG

## 2018-02-16 DIAGNOSIS — I10 ESSENTIAL (PRIMARY) HYPERTENSION: Primary | ICD-10-CM

## 2018-02-16 DIAGNOSIS — N39.0 URINARY TRACT INFECTION: ICD-10-CM

## 2018-02-16 DIAGNOSIS — E78.00 PURE HYPERCHOLESTEROLEMIA: ICD-10-CM

## 2018-02-16 DIAGNOSIS — I48.20 CHRONIC ATRIAL FIBRILLATION (HCC): ICD-10-CM

## 2018-02-16 LAB
BACTERIA UR QL AUTO: ABNORMAL /HPF
BILIRUB UR QL STRIP: NEGATIVE
CAOX CRY URNS QL MICRO: ABNORMAL /HPF
CLARITY UR: CLEAR
COLOR UR: YELLOW
GLUCOSE UR STRIP-MCNC: NEGATIVE MG/DL
HGB UR QL STRIP.AUTO: NEGATIVE
KETONES UR STRIP-MCNC: ABNORMAL MG/DL
LEUKOCYTE ESTERASE UR QL STRIP: NEGATIVE
MUCOUS THREADS UR QL AUTO: ABNORMAL
NITRITE UR QL STRIP: NEGATIVE
NON-SQ EPI CELLS URNS QL MICRO: ABNORMAL /HPF
PH UR STRIP.AUTO: 6 [PH] (ref 4.5–8)
PROT UR STRIP-MCNC: ABNORMAL MG/DL
RBC #/AREA URNS AUTO: ABNORMAL /HPF
SP GR UR STRIP.AUTO: >=1.03 (ref 1–1.03)
UROBILINOGEN UR QL STRIP.AUTO: 0.2 E.U./DL
WBC #/AREA URNS AUTO: ABNORMAL /HPF

## 2018-02-16 PROCEDURE — 81001 URINALYSIS AUTO W/SCOPE: CPT | Performed by: INTERNAL MEDICINE

## 2018-02-16 PROCEDURE — 99214 OFFICE O/P EST MOD 30 MIN: CPT | Performed by: INTERNAL MEDICINE

## 2018-02-16 NOTE — PROGRESS NOTES
Cardiology Follow Up    [place]  1/15/1928  [id number]  HEART & VASCULAR [facility]  [facility]  [address] [place] [address] [address]    No diagnosis found  Interval History:  Patient is here for a follow-up visit  She was last seen by me in November  Since that time she has done reasonably well  She had an echocardiogram done in March of last year which demonstrated preserved LV systolic function  She has biatrial cavity enlargement in the setting of atrial fibrillation  She has mild aortic valve stenosis with moderate regurgitation noted  As well she has severe tricuspid regurgitation with a mild to moderate elevation in pulmonary artery pressure  There was no significant change compared to a prior echocardiogram done February 2016  As usual she is here today with her   She has been doing reasonably well  She has occasional dyspnea related to her lung disease  She does appear more confused today and  has had issues per her  with progressive dementia  Patient Active Problem List   Diagnosis    COPD with acute exacerbation ([facility] [address] )    Essential hypertension    Chronic atrial fibrillation (HCC)    Hyperlipidemia    Influenza A H1N1 infection     Past Medical History:   Diagnosis Date    Arrhythmia     Asthma     [facility]     [facility] disease[facility]     Dementia     Depression     GERD (gastroesophageal reflux disease)     History of heart artery stent     Hyperlipidemia     Hypertension      Social History     Social History    Marital status: /Civil Union     Spouse name: N/A    Number of children: N/A    Years of education: N/A     Occupational History    Not on file       Social History Main Topics    Smoking status: Never Smoker    Smokeless tobacco: Never Used    Alcohol use No    Drug use: No    Sexual activity: Not on file     Other Topics Concern    Not on file     Social History Narrative    No narrative on file      No family history on file  No past surgical history on file  Current Outpatient Prescriptions:     apixaban (ELIQUIS) 2 5 mg, Take 2 5 mg by mouth 2 (two) times a day, Disp: , Rfl:     atorvastatin (LIPITOR) 20 mg tablet, Take 20 mg by mouth daily, Disp: , Rfl:     digoxin (LANOXIN) 0 125 mg tablet, Take 125 mcg by mouth daily, Disp: , Rfl:     donepezil (ARICEPT) 10 mg tablet, Take 10 mg by mouth daily at bedtime, Disp: , Rfl:     ipratropium-albuterol (COMBIVENT)  mcg/act inhaler, Inhale 1 puff 2 (two) times a day, Disp: , Rfl:     isosorbide dinitrate (ISORDIL) 30 mg tablet, Take 30 mg by mouth daily, Disp: , Rfl:     metoprolol tartrate (LOPRESSOR) 25 mg tablet, Take 25 mg by mouth every morning, Disp: , Rfl:     metoprolol tartrate (LOPRESSOR) 50 mg tablet, Take 50 mg by mouth every evening, Disp: , Rfl:     mirtazapine (REMERON) 7 5 MG tablet, Take 7 5 mg by mouth daily at bedtime, Disp: , Rfl:     Multiple Vitamins-Minerals (PRESERVISION AREDS 2+MULTI VIT PO), Take 1 capsule by mouth 2 (two) times a day, Disp: , Rfl:     pantoprazole (PROTONIX) 40 mg tablet, Take 40 mg by mouth daily, Disp: , Rfl:     Specialty Vitamins Products (MAGNESIUM, AMINO ACID CHELATE,) 133 MG tablet, Take 1 tablet by mouth daily, Disp: , Rfl:   Allergies   Allergen Reactions    Asa [Aspirin]     Shellfish-Derived Products      IV dye    Sulfa Antibiotics     Thiazide-Type Diuretics        Labs:not applicable  Imaging: No results found  Review of Systems:  Review of Systems   Constitutional:        Confused   All other systems reviewed and are negative  Physical Exam:  Physical Exam   Constitutional: She is oriented to person, place, and time  She appears well-developed and well-nourished  HENT:   Head: Normocephalic and atraumatic     Eyes: Conjunctivae and EOM are normal  Pupils are equal, round, and reactive to light  Neck: Normal range of motion  Neck supple  Cardiovascular: Normal rate and normal heart sounds  Pulmonary/Chest: Effort normal and breath sounds normal    Neurological: She is alert and oriented to person, place, and time  Skin: Skin is warm and dry  Psychiatric: She has a normal mood and affect  Vitals reviewed  Discussion/Summary:I will continue the patient's present medical regimen  The patient appears well compensated  I have asked the patient to call if there is a problem in the interim otherwise I will see the patient in six months time

## 2018-03-15 DIAGNOSIS — G25.81 RESTLESS LEGS: Primary | ICD-10-CM

## 2018-03-15 RX ORDER — ROPINIROLE 0.5 MG/1
TABLET, FILM COATED ORAL
Qty: 60 TABLET | Refills: 2 | Status: SHIPPED | OUTPATIENT
Start: 2018-03-15 | End: 2018-06-10 | Stop reason: SDUPTHER

## 2018-03-20 ENCOUNTER — PATIENT OUTREACH (OUTPATIENT)
Dept: OTHER | Facility: HOSPITAL | Age: 83
End: 2018-03-20

## 2018-03-20 NOTE — PROGRESS NOTES
Spoke to [place]'s  Annabel Little about her current health condition  He reports that they are "hanging in"  They have a caregiver coming out to their home 4 days a week for laundry, cooking and cleaning  It is private pay  They do not have any family support as his only child lives in [place]  He is constantly evaluating their situation and for the time being would like to stay in their home, but realizes that may change depending on the way her and his health progresses  He says that [place] "basically" eats pretty good and that she has the typical aches and pains from arthritis  She does not use any assistive device to ambulate  At this time he does not want to consider moving out of their home or increasing home services  He took down my contact information if anything changes, but otherwise he is happy with the current situation

## 2018-04-03 ENCOUNTER — TRANSCRIBE ORDERS (OUTPATIENT)
Dept: LAB | Facility: CLINIC | Age: 83
End: 2018-04-03

## 2018-04-03 ENCOUNTER — APPOINTMENT (OUTPATIENT)
Dept: LAB | Facility: CLINIC | Age: 83
End: 2018-04-03
Payer: MEDICARE

## 2018-04-03 ENCOUNTER — HOSPITAL ENCOUNTER (OUTPATIENT)
Dept: RADIOLOGY | Facility: HOSPITAL | Age: 83
Discharge: HOME | End: 2018-04-03
Payer: MEDICARE

## 2018-04-03 DIAGNOSIS — I11.0 BENIGN HYPERTENSIVE HEART DISEASE WITH CONGESTIVE HEART FAILURE (HCC): ICD-10-CM

## 2018-04-03 DIAGNOSIS — I25.119 ATHEROSCLEROSIS OF NATIVE CORONARY ARTERY WITH ANGINA PECTORIS, UNSPECIFIED WHETHER NATIVE OR TRANSPLANTED HEART (HCC): Primary | ICD-10-CM

## 2018-04-03 DIAGNOSIS — I51.9 MYXEDEMA HEART DISEASE: ICD-10-CM

## 2018-04-03 DIAGNOSIS — I25.119 ATHEROSCLEROSIS OF NATIVE CORONARY ARTERY WITH ANGINA PECTORIS, UNSPECIFIED WHETHER NATIVE OR TRANSPLANTED HEART (HCC): ICD-10-CM

## 2018-04-03 DIAGNOSIS — E03.9 MYXEDEMA HEART DISEASE: ICD-10-CM

## 2018-04-03 LAB
ALBUMIN SERPL BCP-MCNC: 3.3 G/DL (ref 3.5–5)
ALP SERPL-CCNC: 127 U/L (ref 46–116)
ALT SERPL W P-5'-P-CCNC: 38 U/L (ref 12–78)
ANION GAP SERPL CALCULATED.3IONS-SCNC: 8 MMOL/L (ref 4–13)
AST SERPL W P-5'-P-CCNC: 30 U/L (ref 5–45)
BASOPHILS # BLD AUTO: 0.02 THOUSANDS/ΜL (ref 0–0.1)
BASOPHILS NFR BLD AUTO: 0 % (ref 0–1)
BILIRUB SERPL-MCNC: 0.6 MG/DL (ref 0.2–1)
BUN SERPL-MCNC: 16 MG/DL (ref 5–25)
CALCIUM SERPL-MCNC: 8.7 MG/DL (ref 8.3–10.1)
CHLORIDE SERPL-SCNC: 106 MMOL/L (ref 100–108)
CO2 SERPL-SCNC: 29 MMOL/L (ref 21–32)
CREAT SERPL-MCNC: 0.85 MG/DL (ref 0.6–1.3)
EOSINOPHIL # BLD AUTO: 0.1 THOUSAND/ΜL (ref 0–0.61)
EOSINOPHIL NFR BLD AUTO: 2 % (ref 0–6)
ERYTHROCYTE [DISTWIDTH] IN BLOOD BY AUTOMATED COUNT: 14.2 % (ref 11.6–15.1)
GFR SERPL CREATININE-BSD FRML MDRD: 61 ML/MIN/1.73SQ M
GLUCOSE SERPL-MCNC: 106 MG/DL (ref 65–140)
HCT VFR BLD AUTO: 40.6 % (ref 34.8–46.1)
HGB BLD-MCNC: 13 G/DL (ref 11.5–15.4)
LYMPHOCYTES # BLD AUTO: 1.31 THOUSANDS/ΜL (ref 0.6–4.47)
LYMPHOCYTES NFR BLD AUTO: 20 % (ref 14–44)
MCH RBC QN AUTO: 31.3 PG (ref 26.8–34.3)
MCHC RBC AUTO-ENTMCNC: 32 G/DL (ref 31.4–37.4)
MCV RBC AUTO: 98 FL (ref 82–98)
MONOCYTES # BLD AUTO: 0.75 THOUSAND/ΜL (ref 0.17–1.22)
MONOCYTES NFR BLD AUTO: 11 % (ref 4–12)
NEUTROPHILS # BLD AUTO: 4.52 THOUSANDS/ΜL (ref 1.85–7.62)
NEUTS SEG NFR BLD AUTO: 67 % (ref 43–75)
NT-PROBNP SERPL-MCNC: 3260 PG/ML
PLATELET # BLD AUTO: 271 THOUSANDS/UL (ref 149–390)
PMV BLD AUTO: 11 FL (ref 8.9–12.7)
POTASSIUM SERPL-SCNC: 4.3 MMOL/L (ref 3.5–5.3)
PROT SERPL-MCNC: 6.2 G/DL (ref 6.4–8.2)
RBC # BLD AUTO: 4.15 MILLION/UL (ref 3.81–5.12)
SODIUM SERPL-SCNC: 143 MMOL/L (ref 136–145)
WBC # BLD AUTO: 6.7 THOUSAND/UL (ref 4.31–10.16)

## 2018-04-03 PROCEDURE — 71046 X-RAY EXAM CHEST 2 VIEWS: CPT

## 2018-04-03 PROCEDURE — 83880 ASSAY OF NATRIURETIC PEPTIDE: CPT

## 2018-04-03 PROCEDURE — 80053 COMPREHEN METABOLIC PANEL: CPT

## 2018-04-03 PROCEDURE — 85025 COMPLETE CBC W/AUTO DIFF WBC: CPT

## 2018-04-03 PROCEDURE — 36415 COLL VENOUS BLD VENIPUNCTURE: CPT

## 2018-04-28 ENCOUNTER — HOSPITAL ENCOUNTER (EMERGENCY)
Facility: HOSPITAL | Age: 83
Discharge: HOME | End: 2018-04-28
Attending: EMERGENCY MEDICINE | Admitting: EMERGENCY MEDICINE
Payer: MEDICARE

## 2018-04-28 ENCOUNTER — APPOINTMENT (EMERGENCY)
Dept: RADIOLOGY | Facility: HOSPITAL | Age: 83
End: 2018-04-28
Payer: MEDICARE

## 2018-04-28 VITALS
TEMPERATURE: 98.2 F | OXYGEN SATURATION: 97 % | DIASTOLIC BLOOD PRESSURE: 81 MMHG | HEART RATE: 80 BPM | RESPIRATION RATE: 18 BRPM | BODY MASS INDEX: 20.26 KG/M2 | WEIGHT: 103.17 LBS | SYSTOLIC BLOOD PRESSURE: 203 MMHG | HEIGHT: 60 IN

## 2018-04-28 DIAGNOSIS — S42.151A GLENOID FRACTURE OF SHOULDER, RIGHT, CLOSED, INITIAL ENCOUNTER: Primary | ICD-10-CM

## 2018-04-28 DIAGNOSIS — S42.141A GLENOID FRACTURE OF SHOULDER, RIGHT, CLOSED, INITIAL ENCOUNTER: Primary | ICD-10-CM

## 2018-04-28 PROCEDURE — 99283 EMERGENCY DEPT VISIT LOW MDM: CPT

## 2018-04-28 PROCEDURE — 73030 X-RAY EXAM OF SHOULDER: CPT

## 2018-04-28 RX ORDER — TRAMADOL HYDROCHLORIDE 50 MG/1
50 TABLET ORAL EVERY 6 HOURS PRN
Qty: 15 TABLET | Refills: 0 | Status: SHIPPED | OUTPATIENT
Start: 2018-04-28 | End: 2020-01-01 | Stop reason: ALTCHOICE

## 2018-04-28 RX ORDER — ACETAMINOPHEN 325 MG/1
650 TABLET ORAL ONCE
Status: COMPLETED | OUTPATIENT
Start: 2018-04-28 | End: 2018-04-28

## 2018-04-28 RX ADMIN — ACETAMINOPHEN 650 MG: 325 TABLET, FILM COATED ORAL at 19:03

## 2018-04-28 NOTE — DISCHARGE INSTRUCTIONS
Take acetaminophen 650 mg orally every 6 hours as needed for discomfort  Apply ice to the shoulder for 15 minutes intervals over the next couple of days to minimize pain  Use the sling during the day for support/comfort  You may try tramadol 50 mg if needed for severe pain not controlled by acetaminophen  Use this medication with caution as it may cause drowsiness  Schedule follow-up appointment for re-evaluation with Orthopedics in the next week  Call on Monday April 30th to schedule an appointment  Scapular Fracture   WHAT YOU NEED TO KNOW:   A scapular fracture is a break in the scapula (shoulder blade)  The scapula is a large, flat bone shaped like a triangle that is located on each side of your upper back  DISCHARGE INSTRUCTIONS:   Medicines:   · Pain medicine: You may be given a prescription medicine to decrease pain  Do not wait until the pain is severe before you ask for more medicine  If your pain medicine contains acetaminophen, do not take over-the-counter acetaminophen as well  Too much acetaminophen can damage your liver  · Take your medicine as directed  Contact your healthcare provider if you think your medicine is not helping or if you have side effects  Tell him of her if you are allergic to any medicine  Keep a list of the medicines, vitamins, and herbs you take  Include the amounts, and when and why you take them  Bring the list or the pill bottles to follow-up visits  Carry your medicine list with you in case of an emergency  Follow up with your healthcare provider or orthopedist within 2 to 3 days:  Write down your questions so you remember to ask them during your visits  Activity:  Talk to your healthcare provider or orthopedist before you start to exercise  Start slowly and do more as you get stronger  Exercise will help make your bones and muscles stronger  Do not play contact sports, such as football and wrestling, while your scapula is still healing   Ask when you can start playing contact sports  Physical therapy:  You may need physical therapy once your swelling and pain have improved  A physical therapist will teach you exercises to help improve movement and strength  Ice:  Ice helps decrease swelling and pain  Ice may also help prevent tissue damage  Use an ice pack, or put crushed ice in a plastic bag  Cover it with a towel and place it on your scapula for 15 to 20 minutes every hour or as directed  Sling care:  Healthcare providers may put your arm in a sling to support your scapula while it heals  Keep your sling clean, and adjust it so that your arm is comfortable  Rest:  Rest when you feel it is needed  Slowly start to do more each day  Return to your daily activities as directed  Contact your healthcare provider or orthopedist if:   · You have a fever  · You have more swelling than you did before your arm was put into the sling  · Your skin is itchy, swollen, or has a rash  · You have questions or concerns about your condition or care  Return to the emergency department if:   · You cannot move your fingers  · Any part of your arm becomes blue, pale, cold, or numb  · Your pain is not relieved or gets worse, even after you take medicine  · You suddenly feel lightheaded and short of breath  · You have chest pain when you take a deep breath or cough  You may cough up blood  · Your arm feels warm, tender, and painful  It may look swollen and red  © 2017 [address]  Information is for End User's use only and may not be sold, redistributed or otherwise used for commercial purposes  All illustrations and images included in CareNotes® are the copyrighted property of A D A M , Inc  or [employer]  The above information is an  only  It is not intended as medical advice for individual conditions or treatments   Talk to your doctor, nurse or pharmacist before following any medical regimen to see if it is safe and effective for you

## 2018-04-28 NOTE — ED PROVIDER NOTES
History  Chief Complaint   Patient presents with    Shoulder Injury     Pt reports falling into wall and hitting right shoulder approx 15 minutes ago  22-year-old right-hand-dominant female presents to the emergency department with right shoulder pain  She explains that she fell while navigating a step  She impacted the shoulder into the wall approximately 15 minutes ago  She has had ongoing discomfort since then  No head injury, chest discomfort or shortness of breath  No paresthesias or numbness distal to the site  No history of prior shoulder injury  Past medical history significant for hypertension, COPD, elevated cholesterol and atrial fibrillation for which she takes Eliquis  Prior to Admission Medications   Prescriptions Last Dose Informant Patient Reported? Taking?    Multiple Vitamins-Minerals (PRESERVISION AREDS 2+MULTI VIT PO)   Yes No   Sig: Take 1 capsule by mouth 2 (two) times a day   Specialty Vitamins Products (MAGNESIUM, AMINO ACID CHELATE,) 133 MG tablet   Yes No   Sig: Take 1 tablet by mouth daily   apixaban (ELIQUIS) 2 5 mg   Yes No   Sig: Take 2 5 mg by mouth 2 (two) times a day   atorvastatin (LIPITOR) 20 mg tablet   Yes No   Sig: Take 20 mg by mouth daily   digoxin (LANOXIN) 0 125 mg tablet   Yes No   Sig: Take 125 mcg by mouth daily   donepezil (ARICEPT) 10 mg tablet   Yes No   Sig: Take 10 mg by mouth daily at bedtime   ipratropium-albuterol (COMBIVENT)  mcg/act inhaler   Yes No   Sig: Inhale 1 puff 2 (two) times a day   isosorbide dinitrate (ISORDIL) 30 mg tablet   Yes No   Sig: Take 30 mg by mouth daily   metoprolol tartrate (LOPRESSOR) 25 mg tablet   Yes No   Sig: Take 25 mg by mouth every morning   metoprolol tartrate (LOPRESSOR) 50 mg tablet   Yes No   Sig: Take 50 mg by mouth every evening   mirtazapine (REMERON) 7 5 MG tablet   Yes No   Sig: Take 7 5 mg by mouth daily at bedtime   pantoprazole (PROTONIX) 40 mg tablet   Yes No   Sig: Take 40 mg by mouth daily   rOPINIRole (REQUIP) 0 5 mg tablet   No No   Sig: take 1 tablet by mouth at bedtime FOR 2 WEEKS THEN INCREASE TO 2 TABLETS FOR RESTLESS LEGS      Facility-Administered Medications: None       Past Medical History:   Diagnosis Date    Arrhythmia     Asthma     Atrial fibrillation ([address] )     Cardiac disease     COPD (chronic obstructive pulmonary disease) (HCC)     Dementia     Depression     GERD (gastroesophageal reflux disease)     History of heart artery stent     Hyperlipidemia     Hypertension        History reviewed  No pertinent surgical history  History reviewed  No pertinent family history  I have reviewed and agree with the history as documented  Social History   Substance Use Topics    Smoking status: Never Smoker    Smokeless tobacco: Never Used    Alcohol use No        Review of Systems   All other systems reviewed and are negative  Physical Exam  ED Triage Vitals   Temperature Pulse Respirations Blood Pressure SpO2   04/28/18 1937 04/28/18 1857 04/28/18 1857 04/28/18 1900 04/28/18 1857   98 2 °F (36 8 °C) 69 18 (!) 184/74 98 %      Temp Source Heart Rate Source Patient Position - Orthostatic VS BP Location FiO2 (%)   04/28/18 1937 04/28/18 1857 04/28/18 1900 04/28/18 1900 --   Oral Monitor Sitting Left arm       Pain Score       04/28/18 1857       Worst Possible Pain           Orthostatic Vital Signs  Vitals:    04/28/18 1857 04/28/18 1900 04/28/18 1937   BP:  (!) 184/74 (!) 203/81   Pulse: 69  80   Patient Position - Orthostatic VS:  Sitting Sitting       Physical Exam   Constitutional: She is oriented to person, place, and time  She appears well-developed and well-nourished  Patient uncomfortable appearing  Holds right shoulder much lower than the left  She keeps this adducted  Eyes: EOM are normal    Cardiovascular: Normal rate  Irregularly irregular   Pulmonary/Chest: Effort normal and breath sounds normal    Abdominal: Soft   Bowel sounds are normal  Musculoskeletal:   Patient with diffuse tenderness of the right shoulder  No clavicle tenderness  No tenderness of the right upper arm distal to the shoulder  Full range of motion at the right elbow, wrist and hand  Very limited movement of the right shoulder  Neurological: She is alert and oriented to person, place, and time  Sensation intact throughout the right upper extremity  5/5 strength with bilateral hand , finger abduction and pincer grasp  She is able to fully range the right wrist with dorsi and volar flexion  Unable to maintain this positioning with good strength-limited by discomfort  She is able to fully range the right elbow  Skin: Skin is warm and dry  Capillary refill takes less than 2 seconds  Psychiatric: She has a normal mood and affect  Her behavior is normal    Nursing note and vitals reviewed  ED Medications  Medications   acetaminophen (TYLENOL) tablet 650 mg (650 mg Oral Given 4/28/18 1903)       Diagnostic Studies  Results Reviewed     None                 XR shoulder 2+ views RIGHT   ED Interpretation by Doria Goldberg, MD (04/28 1932)   Humerus intact  Fracture appreciated of the glenoid-minimally displaced  Procedures  Procedures       Phone Contacts  ED Phone Contact    ED Course  ED Course as of Apr 28 2217   Sat Apr 28, 2018   5426 Findings reviewed with on-call orthopedic specialist-Dr Perez  Will apply sling and have patient follow up in the office  MDM  Number of Diagnoses or Management Options  Glenoid fracture of shoulder, right, closed, initial encounter:   Diagnosis management comments: Study results, treatment and follow-up plan reviewed with patient and visitor  Patient will stick with acetaminophen at this time  She has been provided with a prescription for tramadol which she may use if she has inadequate pain relief from acetaminophen alone    She is aware to use this with significant caution and that it may lead to some sedation  She does not anticipate using it   aware was reviewed prior to prescription  CritCare Time    Disposition  Final diagnoses:   Glenoid fracture of shoulder, right, closed, initial encounter     Time reflects when diagnosis was documented in both MDM as applicable and the Disposition within this note     Time User Action Codes Description Comment    4/28/2018  7:39 PM Diane ESCOBAR Add [V73 263Q,  S42 151A] Glenoid fracture of shoulder, right, closed, initial encounter       ED Disposition     ED Disposition Condition Comment    Discharge  Nick Mohr discharge to home/self care      Condition at discharge: Good        Follow-up Information     Follow up With Specialties Details Why [address] [facility] Schedule an appointment as soon as possible for a visit To arrange a follow-up appointment for this coming week [address] [place] [address]  [phone]        Discharge Medication List as of 4/28/2018  7:42 PM      START taking these medications    Details   traMADol (ULTRAM) 50 mg tablet Take 1 tablet (50 mg total) by mouth every 6 (six) hours as needed for severe pain, Starting Sat 4/28/2018, Print         CONTINUE these medications which have NOT CHANGED    Details   apixaban (ELIQUIS) 2 5 mg Take 2 5 mg by mouth 2 (two) times a day, Historical Med      atorvastatin (LIPITOR) 20 mg tablet Take 20 mg by mouth daily, Historical Med      digoxin (LANOXIN) 0 125 mg tablet Take 125 mcg by mouth daily, Historical Med      donepezil (ARICEPT) 10 mg tablet Take 10 mg by mouth daily at bedtime, Historical Med      ipratropium-albuterol (COMBIVENT)  mcg/act inhaler Inhale 1 puff 2 (two) times a day, Historical Med      isosorbide dinitrate (ISORDIL) 30 mg tablet Take 30 mg by mouth daily, Historical Med      !! metoprolol tartrate (LOPRESSOR) 25 mg tablet Take 25 mg by mouth every morning, Historical Med      !! metoprolol tartrate (LOPRESSOR) 50 mg tablet Take 50 mg by mouth every evening, Historical Med      mirtazapine (REMERON) 7 5 MG tablet Take 7 5 mg by mouth daily at bedtime, Historical Med      Multiple Vitamins-Minerals (PRESERVISION AREDS 2+MULTI VIT PO) Take 1 capsule by mouth 2 (two) times a day, Historical Med      pantoprazole (PROTONIX) 40 mg tablet Take 40 mg by mouth daily, Historical Med      rOPINIRole (REQUIP) 0 5 mg tablet take 1 tablet by mouth at bedtime FOR 2 WEEKS THEN INCREASE TO 2 TABLETS FOR RESTLESS LEGS, Normal      Specialty Vitamins Products (MAGNESIUM, AMINO ACID CHELATE,) 133 MG tablet Take 1 tablet by mouth daily, Historical Med       !! - Potential duplicate medications found  Please discuss with provider  No discharge procedures on file      ED Provider  Electronically Signed by           Anand Uribe MD  04/28/18 4389

## 2018-05-02 RX ORDER — IPRATROPIUM/ALBUTEROL SULFATE 20-100 MCG
MIST INHALER (GRAM) INHALATION
Refills: 1 | COMMUNITY
Start: 2018-02-19 | End: 2019-05-21 | Stop reason: SDUPTHER

## 2018-05-02 RX ORDER — IPRATROPIUM BROMIDE 42 UG/1
SPRAY, METERED NASAL
Refills: 1 | COMMUNITY
Start: 2018-04-21 | End: 2020-01-01 | Stop reason: HOSPADM

## 2018-05-02 RX ORDER — FUROSEMIDE 40 MG/1
40 TABLET ORAL 2 TIMES DAILY
Refills: 0 | COMMUNITY
Start: 2018-04-06 | End: 2020-01-01 | Stop reason: HOSPADM

## 2018-05-02 RX ORDER — MEMANTINE HYDROCHLORIDE 10 MG/1
10 TABLET ORAL DAILY
Refills: 0 | COMMUNITY
Start: 2018-04-04 | End: 2020-01-01 | Stop reason: HOSPADM

## 2018-05-03 ENCOUNTER — APPOINTMENT (OUTPATIENT)
Dept: RADIOLOGY | Facility: OTHER | Age: 83
End: 2018-05-03
Payer: MEDICARE

## 2018-05-03 ENCOUNTER — OFFICE VISIT (OUTPATIENT)
Dept: OBGYN CLINIC | Facility: OTHER | Age: 83
End: 2018-05-03
Payer: MEDICARE

## 2018-05-03 VITALS
WEIGHT: 104.8 LBS | HEART RATE: 76 BPM | SYSTOLIC BLOOD PRESSURE: 167 MMHG | BODY MASS INDEX: 20.47 KG/M2 | DIASTOLIC BLOOD PRESSURE: 67 MMHG

## 2018-05-03 DIAGNOSIS — S42.141A CLOSED FRACTURE OF GLENOID CAVITY AND NECK OF RIGHT SCAPULA, INITIAL ENCOUNTER: ICD-10-CM

## 2018-05-03 DIAGNOSIS — S42.151A CLOSED FRACTURE OF GLENOID CAVITY AND NECK OF RIGHT SCAPULA, INITIAL ENCOUNTER: ICD-10-CM

## 2018-05-03 DIAGNOSIS — M25.511 ACUTE PAIN OF RIGHT SHOULDER: Primary | ICD-10-CM

## 2018-05-03 DIAGNOSIS — M25.511 ACUTE PAIN OF RIGHT SHOULDER: ICD-10-CM

## 2018-05-03 PROCEDURE — 23570 CLTX SCAPULAR FX W/O MNPJ: CPT | Performed by: ORTHOPAEDIC SURGERY

## 2018-05-03 PROCEDURE — 99203 OFFICE O/P NEW LOW 30 MIN: CPT | Performed by: ORTHOPAEDIC SURGERY

## 2018-05-03 PROCEDURE — 73030 X-RAY EXAM OF SHOULDER: CPT

## 2018-05-03 NOTE — PROGRESS NOTES
Orthopaedic Surgery - Office Note  Maine (80 y o  female)   : 1/15/1928   MRN: [id number]  Encounter Date: 5/3/2018    Chief Complaint   Patient presents with    Right Shoulder - Pain       Assessment / Plan    Intra-articular glenoid fracture    · Continue with the sling  · NWB RUE  · Continue with tylenol as needed for pain  · May come out of sling for gentle ROM exercises of elbow and wrist  May do pendulums as tolerated  Return in about 4 weeks (around 2018)  will get repeat x-ray    History of Present Illness  Maine is a 80 y o  female who presents for right shoulder pain after a fall on 18  She states that she tripped on a step and fell into the wall, hitting her shoulder on the wall  She had increased shoulder pain and went to the ER  At the ER they did X-rays and put her in a sling for treatment of a glenoid and scapula fracture  She states her pain has gotten better since the fall  She complains of pain in the posterior shoulder  The pain does not radiate  She has been taking tylenol for the pain  The pain is worse with movement  She denies any numbness or tingling  Review of Systems  Const: weight loss  Eyes: positive for visual disturbance  HEENT: positive for hearing loss  Resp: positive for SOB  CV: positive for palpitations  GI: negative  :negative  Skin: negative  Heme/lymph: negative  MSK:see HPI  Neuro: see HPI  Psych: negative  Endo: negative  Allergy/Immune: negative    Physical Exam  /67 (BP Location: Left arm, Patient Position: Sitting, Cuff Size: Standard)   Pulse 76   Wt 47 5 kg (104 lb 12 8 oz)   BMI 20 47 kg/m²   Cons: Appears well  No apparent distress  Psych: Alert  Oriented x3  Mood and affect normal   Eyes: PERRLA, EOMI  Resp: Normal effort  No audible wheezing or stridor  CV: Palpable pulse  No discernable arrhythmia  No LE edema  Lymph:  No palpable cervical, axillary, or inguinal lymphadenopathy  Skin: Warm    No palpable masses  No visible lesions  Neuro: Normal muscle tone  Normal and symmetric DTR's  Right shoulder:  ROM limited due to pain  TTP over posterior shoulder, AC joint  Sensation median, ulnar, radial, axillary nerves intact  Brisk cap refill  Palpable radial pulse  No ecchymosis      Studies Reviewed  I have personally reviewed pertinent films in PACS  and I have personally reviewed pertinent films in PACS and my interpretation is intra-articular glenoid fracture   Procedures  No procedures today  Medical, Surgical, Family, and Social History  The patient's medical history, family history, and social history, were reviewed and updated as appropriate  Past Medical History:   Diagnosis Date    Arrhythmia     Asthma     [facility]     Cardiac disease     [facility]     Dementia     Depression     GERD (gastroesophageal reflux disease)     History of heart artery stent     Hyperlipidemia     Hypertension        History reviewed  No pertinent surgical history  History reviewed  No pertinent family history  Social History     Occupational History    Not on file       Social History Main Topics    Smoking status: Never Smoker    Smokeless tobacco: Never Used    Alcohol use No    Drug use: No    Sexual activity: Not on file       Allergies   Allergen Reactions    Asa [Aspirin]     Shellfish-Derived Products      IV dye    Sulfa Antibiotics     Thiazide-Type Diuretics          Current Outpatient Prescriptions:     apixaban (ELIQUIS) 2 5 mg, Take 2 5 mg by mouth 2 (two) times a day, Disp: , Rfl:     atorvastatin (LIPITOR) 20 mg tablet, Take 20 mg by mouth daily, Disp: , Rfl:     COMBIVENT RESPIMAT inhaler, inhale 1 puff by mouth four times a day, Disp: , Rfl: 1    digoxin (LANOXIN) 0 125 mg tablet, Take 125 mcg by mouth daily, Disp: , Rfl:     donepezil (ARICEPT) 10 mg tablet, Take 10 mg by mouth daily at bedtime, Disp: , Rfl:     furosemide (LASIX) 40 mg tablet, Take 40 mg by mouth daily, Disp: , Rfl: 0    ipratropium (ATROVENT) 0 06 % nasal spray, , Disp: , Rfl: 1    ipratropium-albuterol (COMBIVENT)  mcg/act inhaler, Inhale 1 puff 2 (two) times a day, Disp: , Rfl:     isosorbide dinitrate (ISORDIL) 30 mg tablet, Take 30 mg by mouth daily, Disp: , Rfl:     memantine (NAMENDA) 10 mg tablet, Take 10 mg by mouth daily, Disp: , Rfl: 0    metoprolol tartrate (LOPRESSOR) 25 mg tablet, Take 25 mg by mouth every morning, Disp: , Rfl:     metoprolol tartrate (LOPRESSOR) 50 mg tablet, Take 50 mg by mouth every evening, Disp: , Rfl:     mirtazapine (REMERON) 7 5 MG tablet, Take 7 5 mg by mouth daily at bedtime, Disp: , Rfl:     Multiple Vitamins-Minerals (PRESERVISION AREDS 2+MULTI VIT PO), Take 1 capsule by mouth 2 (two) times a day, Disp: , Rfl:     pantoprazole (PROTONIX) 40 mg tablet, Take 40 mg by mouth daily, Disp: , Rfl:     rOPINIRole (REQUIP) 0 5 mg tablet, take 1 tablet by mouth at bedtime FOR 2 WEEKS THEN INCREASE TO 2 TABLETS FOR RESTLESS LEGS, Disp: 60 tablet, Rfl: 2    Specialty Vitamins Products (MAGNESIUM, AMINO ACID CHELATE,) 133 MG tablet, Take 1 tablet by mouth daily, Disp: , Rfl:     traMADol (ULTRAM) 50 mg tablet, Take 1 tablet (50 mg total) by mouth every 6 (six) hours as needed for severe pain, Disp: 15 tablet, Rfl: 0      Socorro Reyna PA-C    Scribe Attestation    I,:    am acting as a scribe while in the presence of the attending physician :        I,:    personally performed the services described in this documentation    as scribed in my presence :

## 2018-05-15 ENCOUNTER — OFFICE VISIT (OUTPATIENT)
Dept: CARDIOLOGY CLINIC | Facility: CLINIC | Age: 83
End: 2018-05-15
Payer: MEDICARE

## 2018-05-15 VITALS
SYSTOLIC BLOOD PRESSURE: 134 MMHG | HEART RATE: 66 BPM | HEIGHT: 60 IN | BODY MASS INDEX: 20.42 KG/M2 | WEIGHT: 104 LBS | DIASTOLIC BLOOD PRESSURE: 70 MMHG

## 2018-05-15 DIAGNOSIS — I34.0 NON-RHEUMATIC MITRAL REGURGITATION: ICD-10-CM

## 2018-05-15 DIAGNOSIS — I48.91 ATRIAL FIBRILLATION, UNSPECIFIED TYPE (HCC): ICD-10-CM

## 2018-05-15 DIAGNOSIS — I10 ESSENTIAL (PRIMARY) HYPERTENSION: Primary | ICD-10-CM

## 2018-05-15 PROCEDURE — 99214 OFFICE O/P EST MOD 30 MIN: CPT | Performed by: INTERNAL MEDICINE

## 2018-05-15 NOTE — PROGRESS NOTES
Cardiology Follow Up    [place]  1/15/1928  [id number]  [address] [facility]  [address] [place] [address] [address]    1  Essential (primary) hypertension     2  Atrial fibrillation, unspecified type ([facility] )     3  Non-rheumatic mitral regurgitation         Interval History:  Patient is here for a follow-up visit  She was last seen by me in February  She was recently seen in the emergency room in April after a fall  Patient was noted to have a fracture of the glenoid and follow-up with Orthopedics was arranged  A non operative approach was selected and a sling was placed  Her most recent echocardiogram was done in March of last year demonstrated preserved LV systolic function with mild LVH  She was noted to have biatrial cavity enlargement  She was 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 patent foramen ovale  She had mild aortic valve stenosis with moderate regurgitation and severe tricuspid regurgitation with elevated pulmonary artery pressure  She has been doing well from a cardiac point of view  She has had no chest pain or significant dyspnea  Patient Active Problem List   Diagnosis    COPD with acute exacerbation ([facility] )    Essential hypertension    Chronic atrial fibrillation (HCC)    Hyperlipidemia    Influenza A H1N1 infection     Past Medical History:   Diagnosis Date    Arrhythmia     Asthma     [facility]     Cardiac disease     COPD (chronic obstructive pulmonary disease) ([facility] )     Dementia     Depression     GERD (gastroesophageal reflux disease)     History of heart artery stent     Hyperlipidemia     Hypertension      Social History     Social History    Marital status: /Civil Union     Spouse name: N/A    Number of children: N/A    Years of education: N/A     Occupational History    Not on file       Social History Main Topics    Smoking status: Never Smoker    Smokeless tobacco: Never Used    Alcohol use No    Drug use: No    Sexual activity: Not on file     Other Topics Concern    Not on file     Social History Narrative    No narrative on file      No family history on file  No past surgical history on file      Current Outpatient Prescriptions:     apixaban (ELIQUIS) 2 5 mg, Take 2 5 mg by mouth 2 (two) times a day, Disp: , Rfl:     atorvastatin (LIPITOR) 20 mg tablet, Take 20 mg by mouth daily, Disp: , Rfl:     COMBIVENT RESPIMAT inhaler, inhale 1 puff by mouth four times a day, Disp: , Rfl: 1    digoxin (LANOXIN) 0 125 mg tablet, Take 125 mcg by mouth daily, Disp: , Rfl:     donepezil (ARICEPT) 10 mg tablet, Take 10 mg by mouth daily at bedtime, Disp: , Rfl:     furosemide (LASIX) 40 mg tablet, Take 40 mg by mouth daily, Disp: , Rfl: 0    ipratropium (ATROVENT) 0 06 % nasal spray, , Disp: , Rfl: 1    ipratropium-albuterol (COMBIVENT)  mcg/act inhaler, Inhale 1 puff 2 (two) times a day, Disp: , Rfl:     isosorbide dinitrate (ISORDIL) 30 mg tablet, Take 30 mg by mouth daily, Disp: , Rfl:     memantine (NAMENDA) 10 mg tablet, Take 10 mg by mouth daily, Disp: , Rfl: 0    metoprolol tartrate (LOPRESSOR) 25 mg tablet, Take 25 mg by mouth every morning, Disp: , Rfl:     metoprolol tartrate (LOPRESSOR) 50 mg tablet, Take 50 mg by mouth every evening, Disp: , Rfl:     mirtazapine (REMERON) 7 5 MG tablet, Take 7 5 mg by mouth daily at bedtime, Disp: , Rfl:     Multiple Vitamins-Minerals (PRESERVISION AREDS 2+MULTI VIT PO), Take 1 capsule by mouth 2 (two) times a day, Disp: , Rfl:     pantoprazole (PROTONIX) 40 mg tablet, Take 40 mg by mouth daily, Disp: , Rfl:     rOPINIRole (REQUIP) 0 5 mg tablet, take 1 tablet by mouth at bedtime FOR 2 WEEKS THEN INCREASE TO 2 TABLETS FOR RESTLESS LEGS, Disp: 60 tablet, Rfl: 2    Specialty Vitamins Products (MAGNESIUM, AMINO ACID CHELATE,) 133 MG tablet, Take 1 tablet by mouth daily, Disp: , Rfl:     traMADol (ULTRAM) 50 mg tablet, Take 1 tablet (50 mg total) by mouth every 6 (six) hours as needed for severe pain, Disp: 15 tablet, Rfl: 0  Allergies   Allergen Reactions    Asa [Aspirin]     Shellfish-Derived Products      IV dye    Sulfa Antibiotics     Thiazide-Type Diuretics        Labs:not applicable  Imaging: Xr Shoulder 2+ Vw Right    Result Date: 5/8/2018  Narrative: RIGHT SHOULDER INDICATION:   M25 511: Pain in right shoulder  COMPARISON:  Right shoulder series of 4/28/2018 VIEWS:  XR SHOULDER 2+ VW RIGHT FINDINGS: Oblique comminuted fracture is again noted through the mid aspect of the glenoid probably overall better seen on the prior examination where the fracture line is seen extending to the inferior aspect of the scapular body  Proximal humerus is intact  No  dislocation  The acromioclavicular joint is maintained  The bones are demineralized  Multiple surgical clips again noted in the lower neck  Impression: Comminuted fracture again noted of the scapula extending to the glenoid  This is probably overall better seen on the prior exam due to differences in projection  No obvious change in the fracture appearance  Workstation performed: [id number]     Xr Shoulder 2+ Views Right    Result Date: 4/29/2018  Narrative: RIGHT SHOULDER INDICATION:   Pain s/p fall w/ impact of shoulder into wall  COMPARISON:  5/12/2014 VIEWS:  XR SHOULDER 2+ VW RIGHT FINDINGS: There is acute fracture of the scapula involving the scapular body and glenoid, with slight comminution and deformity evident  The humeral head is intact and remains normally aligned with the glenoid  There is perhaps slight elevation of the distal portion of the right clavicle with respect to the acromion which is new from the previous exam and might indicate acromioclavicular injury as well  No significant degenerative changes  There is generalized osteopenia  No lytic or blastic bone lesions are seen    There may be mild compression deformities of a couple thoracic vertebrae, not optimally demonstrated on this exam  There are surgical clips at the neck suggesting prior thyroid surgery  Impression: Acute comminuted scapular fracture  Possible acute cranial clavicular joint injury  Questionable age indeterminate thoracic vertebral compression deformities  The glenoid fracture was noted by the emergency department Workstation performed: [id number]       Review of Systems:  Review of Systems   Musculoskeletal:        Right shoulder pain   All other systems reviewed and are negative  Physical Exam:  Physical Exam   Constitutional: She is oriented to person, place, and time  She appears well-developed and well-nourished  HENT:   Head: Normocephalic and atraumatic  Eyes: Conjunctivae and EOM are normal  Pupils are equal, round, and reactive to light  Neck: Normal range of motion  Neck supple  Cardiovascular: Normal rate  Murmur heard  Pulmonary/Chest: Effort normal and breath sounds normal    Neurological: She is alert and oriented to person, place, and time  Skin: Skin is warm and dry  Psychiatric: She has a normal mood and affect  Vitals reviewed  Discussion/Summary:I will continue the patient's present medical regimen  The patient appears well compensated  I have asked the patient to call if there is a problem in the interim otherwise I will see the patient in six months time

## 2018-05-31 ENCOUNTER — APPOINTMENT (OUTPATIENT)
Dept: RADIOLOGY | Facility: OTHER | Age: 83
End: 2018-05-31
Payer: MEDICARE

## 2018-05-31 ENCOUNTER — OFFICE VISIT (OUTPATIENT)
Dept: OBGYN CLINIC | Facility: OTHER | Age: 83
End: 2018-05-31

## 2018-05-31 VITALS
HEART RATE: 83 BPM | SYSTOLIC BLOOD PRESSURE: 203 MMHG | BODY MASS INDEX: 26.17 KG/M2 | DIASTOLIC BLOOD PRESSURE: 69 MMHG | WEIGHT: 134 LBS

## 2018-05-31 DIAGNOSIS — S42.141A GLENOID FRACTURE OF SHOULDER, RIGHT, CLOSED, INITIAL ENCOUNTER: Primary | ICD-10-CM

## 2018-05-31 DIAGNOSIS — S42.151A GLENOID FRACTURE OF SHOULDER, RIGHT, CLOSED, INITIAL ENCOUNTER: ICD-10-CM

## 2018-05-31 DIAGNOSIS — S42.141A GLENOID FRACTURE OF SHOULDER, RIGHT, CLOSED, INITIAL ENCOUNTER: ICD-10-CM

## 2018-05-31 DIAGNOSIS — S42.151A GLENOID FRACTURE OF SHOULDER, RIGHT, CLOSED, INITIAL ENCOUNTER: Primary | ICD-10-CM

## 2018-05-31 PROCEDURE — 73030 X-RAY EXAM OF SHOULDER: CPT

## 2018-05-31 PROCEDURE — 99024 POSTOP FOLLOW-UP VISIT: CPT | Performed by: ORTHOPAEDIC SURGERY

## 2018-05-31 NOTE — PATIENT INSTRUCTIONS
Physical therapy as prescribed     Contact PCP regarded hypertension    Follow up in the office with Dr Olivia Black on an as needed basis

## 2018-05-31 NOTE — PROGRESS NOTES
Assessment  Diagnoses and all orders for this visit:    Glenoid fracture of shoulder, right, closed, initial encounter  -     XR shoulder 2+ vw right; Future  -     Ambulatory referral to Physical Therapy; Future      Discussion and Plan:    Discussion had with patient and  and both in agreement with treatment plan  Overall patient is symptoms have improved dramatically since her last office visit  At this point, patient may discontinue use of sling and will be provided with referral for physical therapy for right shoulder range of motion  Patient's blood pressure taken at today's visit was in excess of 293 mm hg systolic  Patient has a history of hypertension and states that she has been told the past that she has white coat syndrome  She was instructed to contact her primary care provider as soon as possible to discuss this with him for any change in her medication or additional treatment  Patient may follow up in office on an as-needed basis for her right shoulder pain if any problems should arise in the future  Subjective:   Patient ID: Oleg Bagley is a 80 y o  female      26-year-old female presents for evaluation of conservatively treated right intra-articular glenoid fracture  Patient states that her symptoms have significantly improved  She is able to move her right shoulder with minimal pain  She states that she has been doing some home exercises for range of motion right shoulder  She denies any numbness or tingling  Overall she is pleased with her progression thus far  The following portions of the patient's history were reviewed and updated as appropriate: allergies, current medications, past family history, past medical history, past social history, past surgical history and problem list     Review of Systems   Constitutional: Negative  HENT: Negative  Eyes: Negative  Respiratory: Negative  Cardiovascular: Negative  Gastrointestinal: Negative  Endocrine: Negative  Genitourinary: Negative  Musculoskeletal:        As stated in HPI   Skin: Negative  Allergic/Immunologic: Negative  Neurological:        As stated in HPI   Hematological: Negative  Psychiatric/Behavioral: Negative  Objective:  Ortho Exam  Right upper extremity  Skin intact, erythema, no ecchymosis  Minimal tenderness palpation over anterior aspect of shoulder  Able to forward flex actively to approximately 120°  4/5 strength with shoulder abduction external rotation strength test  Distal extremity warm sensate  Physical Exam   Constitutional: She is oriented to person, place, and time  She appears well-developed and well-nourished  HENT:   Head: Normocephalic and atraumatic  Eyes: Pupils are equal, round, and reactive to light  Cardiovascular: Normal rate  Pulmonary/Chest: Effort normal    Abdominal: Soft  Neurological: She is alert and oriented to person, place, and time  Skin: Skin is warm and dry  Psychiatric: She has a normal mood and affect  Her behavior is normal          I have personally reviewed pertinent films in PACS and my interpretation is as follows      X-rays right shoulder reveal minimally displaced right intra-articular glenoid fracture

## 2018-06-01 ENCOUNTER — LAB REQUISITION (OUTPATIENT)
Dept: LAB | Facility: HOSPITAL | Age: 83
End: 2018-06-01
Payer: MEDICARE

## 2018-06-01 DIAGNOSIS — N39.0 URINARY TRACT INFECTION: ICD-10-CM

## 2018-06-01 LAB
BILIRUB UR QL STRIP: NEGATIVE
CLARITY UR: CLEAR
COLOR UR: YELLOW
GLUCOSE UR STRIP-MCNC: NEGATIVE MG/DL
HGB UR QL STRIP.AUTO: NEGATIVE
KETONES UR STRIP-MCNC: NEGATIVE MG/DL
LEUKOCYTE ESTERASE UR QL STRIP: NEGATIVE
NITRITE UR QL STRIP: NEGATIVE
PH UR STRIP.AUTO: 6 [PH] (ref 4.5–8)
PROT UR STRIP-MCNC: NEGATIVE MG/DL
SP GR UR STRIP.AUTO: 1.01 (ref 1–1.03)
UROBILINOGEN UR QL STRIP.AUTO: 0.2 E.U./DL

## 2018-06-01 PROCEDURE — 87086 URINE CULTURE/COLONY COUNT: CPT | Performed by: INTERNAL MEDICINE

## 2018-06-01 PROCEDURE — 81003 URINALYSIS AUTO W/O SCOPE: CPT | Performed by: INTERNAL MEDICINE

## 2018-06-02 LAB — BACTERIA UR CULT: NORMAL

## 2018-06-10 DIAGNOSIS — G25.81 RESTLESS LEGS: ICD-10-CM

## 2018-06-13 ENCOUNTER — APPOINTMENT (OUTPATIENT)
Dept: LAB | Facility: CLINIC | Age: 83
End: 2018-06-13
Payer: MEDICARE

## 2018-06-13 ENCOUNTER — TRANSCRIBE ORDERS (OUTPATIENT)
Dept: LAB | Facility: CLINIC | Age: 83
End: 2018-06-13

## 2018-06-13 ENCOUNTER — EVALUATION (OUTPATIENT)
Dept: PHYSICAL THERAPY | Facility: CLINIC | Age: 83
End: 2018-06-13
Payer: MEDICARE

## 2018-06-13 DIAGNOSIS — M25.511 RIGHT SHOULDER PAIN, UNSPECIFIED CHRONICITY: ICD-10-CM

## 2018-06-13 DIAGNOSIS — I50.9 HEART FAILURE, UNSPECIFIED HF CHRONICITY, UNSPECIFIED HEART FAILURE TYPE (HCC): ICD-10-CM

## 2018-06-13 DIAGNOSIS — I48.20 CHRONIC ATRIAL FIBRILLATION (HCC): ICD-10-CM

## 2018-06-13 DIAGNOSIS — S42.151A GLENOID FRACTURE OF SHOULDER, RIGHT, CLOSED, INITIAL ENCOUNTER: Primary | ICD-10-CM

## 2018-06-13 DIAGNOSIS — S42.141A GLENOID FRACTURE OF SHOULDER, RIGHT, CLOSED, INITIAL ENCOUNTER: Primary | ICD-10-CM

## 2018-06-13 DIAGNOSIS — I48.20 CHRONIC ATRIAL FIBRILLATION (HCC): Primary | ICD-10-CM

## 2018-06-13 LAB
ANION GAP SERPL CALCULATED.3IONS-SCNC: 5 MMOL/L (ref 4–13)
BUN SERPL-MCNC: 39 MG/DL (ref 5–25)
CALCIUM SERPL-MCNC: 8.9 MG/DL (ref 8.3–10.1)
CHLORIDE SERPL-SCNC: 104 MMOL/L (ref 100–108)
CO2 SERPL-SCNC: 32 MMOL/L (ref 21–32)
CREAT SERPL-MCNC: 1.06 MG/DL (ref 0.6–1.3)
GFR SERPL CREATININE-BSD FRML MDRD: 46 ML/MIN/1.73SQ M
GLUCOSE SERPL-MCNC: 99 MG/DL (ref 65–140)
NT-PROBNP SERPL-MCNC: 2987 PG/ML
POTASSIUM SERPL-SCNC: 4.1 MMOL/L (ref 3.5–5.3)
SODIUM SERPL-SCNC: 141 MMOL/L (ref 136–145)

## 2018-06-13 PROCEDURE — 83880 ASSAY OF NATRIURETIC PEPTIDE: CPT

## 2018-06-13 PROCEDURE — G8985 CARRY GOAL STATUS: HCPCS | Performed by: PHYSICAL THERAPIST

## 2018-06-13 PROCEDURE — 36415 COLL VENOUS BLD VENIPUNCTURE: CPT

## 2018-06-13 PROCEDURE — 97162 PT EVAL MOD COMPLEX 30 MIN: CPT | Performed by: PHYSICAL THERAPIST

## 2018-06-13 PROCEDURE — 80048 BASIC METABOLIC PNL TOTAL CA: CPT

## 2018-06-13 PROCEDURE — G8984 CARRY CURRENT STATUS: HCPCS | Performed by: PHYSICAL THERAPIST

## 2018-06-13 PROCEDURE — 97110 THERAPEUTIC EXERCISES: CPT | Performed by: PHYSICAL THERAPIST

## 2018-06-13 NOTE — PROGRESS NOTES
PT Evaluation     Today's date: 2018  Patient name: Monty Montes De Oca  : 1/15/1928  MRN: [id number]  Referring provider: Marshall Vann MD  Dx:   Encounter Diagnosis     ICD-10-CM    1  Glenoid fracture of shoulder, right, closed, initial encounter S42 141A Ambulatory referral to Physical Therapy    S42 151A    2  Right shoulder pain, unspecified chronicity M25 511                   Assessment  Impairments: abnormal or restricted ROM, abnormal movement, activity intolerance, impaired physical strength, pain with function and poor posture     Assessment details: Pt presents with signs and symptoms synonymous of admitting medical diagnosis of R glenoid fracture  Pt presents with decrease postural awareness, pain with movement, decreased strength, decreased range, flexibility, as well as tolerance to [facility] activity  Pt would benefit skilled PT intervention in order to improve the described impairments and to be able to perform all desired activities with a decrease in symptom exacerbation           Understanding of Dx/Px/POC: good   Prognosis: good    Goals  STG 4 Weeks:  Decrease pain at worst to 4/10  Improve range to R Flex 130 Abd 110 Active  Assess strength at end of 4 weeks  Independent with HEP  LTG 8 Weeks:  Decrease pain at worst to minimal to nil  Improve range to Abd 125 active  Improve strength to equal to L shoulder  Able to perform all desired activities with minimal to nil symptom exacerbation      Plan  Planned modality interventions: cryotherapy and thermotherapy: hydrocollator packs  Planned therapy interventions: abdominal trunk stabilization, activity modification, joint mobilization, manual therapy, neuromuscular re-education, patient education, postural training, strengthening, stretching, therapeutic exercise, home exercise program, functional ROM exercises, flexibility and coordination  Frequency: 2x week  Duration in weeks: 6  Treatment plan discussed with: patient        Subjective Evaluation    History of Present Illness  Date of onset: 2018  Mechanism of injury: Pt is a 79yo female who is RHD and presents today stating that on  she was coming down the steps when she lost her balance and impacted her R shoulder into a wall  She immediately felt pain/discomfort and went to the ER on the same day as the fall  Pt met with Dr Efren Bynum on 5/3 and an x-ray was taken that presented with a glenoid fracture and was placed in a sling  On a follow up visit on , pt was advised to remove the sling and try PT for a few weeks  At the time of the fall and when she uses her arm for overhead activity her sharp pain reaches to a 6/10 at worst and is relieved when she is resting  She reports periods of no pain when her arm is relaxed at her side or when she takes Tramadol for severe pain  Pt denied numbness or tingling into fingers or hands  Pt reports that she wishes to be able to reduce the pain during movement, increase range, and improve overall shoulder function  Quality of life: good    Pain  Current pain ratin  At best pain ratin  At worst pain ratin  Quality: sharp  Relieving factors: rest, relaxation and medications  Aggravating factors: lifting and overhead activity  Progression: no change      Diagnostic Tests  X-ray: abnormal (R Glenoid Fracture)  Treatments  Current treatment: medication and physical therapy  Patient Goals  Patient goals for therapy: decreased pain, increased motion, increased strength and independence with ADLs/IADLs          Objective     Active Range of Motion   Left Shoulder   Flexion: 130 degrees   Abduction: 125 degrees   External rotation 0°: Left shoulder active external rotation at 0 degrees: back of head  Internal rotation 0°: Left shoulder active internal rotation at 0 degrees: T6      Right Shoulder   Flexion: 125 degrees   Abduction: 100 degrees   External rotation 0°: Right shoulder active external rotation at 0 degrees: Top of head  Internal rotation 0°: Right shoulder active internal rotation at 0 degrees: T8      Additional Active Range of Motion Details  Forward head, rounded shoulders  B/L Sensation intact to light touch C3,4,5,6,7,8,T1,T2   L/R Symmetrical No pain  UE Screen R not performed due to fx, pain with resisted elbow flexion L WFL Strong and painless  CS Screen [facility] no shoulder pain repr  Palpation Pain of R acromion  Passive  Flex R 135 L 150  Abd R 135 L 105  IR/ER R 60/55 L 65/60    Passive Range of Motion     Additional Passive Range of Motion Details                Precautions: Asthma, A-fib, COPD, Hx depression, HTN, Fall Risk    Daily Treatment Diary       Manual 6/13            PROM R shoulder                                                                  Exercise Diary             Scap Add 10"x10            Table Slides Flex,Abd, ER 6"x10            Scaption              Isometrics all directions             Pulleys             AAROM Wand Flex,Abd, ER             Bike                                                                                                                                                                                                   Modalities             HP/CP prn

## 2018-06-14 RX ORDER — ROPINIROLE 0.5 MG/1
TABLET, FILM COATED ORAL
Qty: 60 TABLET | Refills: 2 | Status: SHIPPED | OUTPATIENT
Start: 2018-06-14 | End: 2020-01-01 | Stop reason: HOSPADM

## 2018-06-19 ENCOUNTER — OFFICE VISIT (OUTPATIENT)
Dept: PHYSICAL THERAPY | Facility: CLINIC | Age: 83
End: 2018-06-19
Payer: MEDICARE

## 2018-06-19 DIAGNOSIS — S42.151A GLENOID FRACTURE OF SHOULDER, RIGHT, CLOSED, INITIAL ENCOUNTER: Primary | ICD-10-CM

## 2018-06-19 DIAGNOSIS — S42.141A GLENOID FRACTURE OF SHOULDER, RIGHT, CLOSED, INITIAL ENCOUNTER: Primary | ICD-10-CM

## 2018-06-19 DIAGNOSIS — M25.511 RIGHT SHOULDER PAIN, UNSPECIFIED CHRONICITY: ICD-10-CM

## 2018-06-19 PROCEDURE — 97112 NEUROMUSCULAR REEDUCATION: CPT

## 2018-06-19 PROCEDURE — 97140 MANUAL THERAPY 1/> REGIONS: CPT

## 2018-06-19 PROCEDURE — 97110 THERAPEUTIC EXERCISES: CPT

## 2018-06-19 NOTE — PROGRESS NOTES
Daily Note     Today's date: 2018  Patient name: Peri King  : 1/15/1928  MRN: [id number]  Referring provider: Lavern Banegas MD  Dx:   Encounter Diagnosis     ICD-10-CM    1  Glenoid fracture of shoulder, right, closed, initial encounter S42 141A     S42 151A    2  Right shoulder pain, unspecified chronicity M25 511                   Subjective: Patient states that she notices an improvement with her tolerance to daily activity  Objective: See treatment diary below  Precautions: Asthma, A-fib, COPD, Hx depression, HTN, Fall Risk    Daily Treatment Diary       Manual            PROM R shoulder   10 min                                                               Exercise Diary             Scap Add 10"x10 :10x10            Table Slides Flex,Abd, ER 6"x10 :05x10           Scaption   x10            Isometrics all directions             Pulleys  3'/3'           AAROM Wand Flex,Abd, ER  :05 x10            Treadmill  5 min                                                                                                                                                                                                 Modalities             HP/CP prn                            Assessment: Tolerated treatment well  Patient exhibited good technique with therapeutic exercises      Plan: Continue per plan of care

## 2018-06-21 ENCOUNTER — OFFICE VISIT (OUTPATIENT)
Dept: PHYSICAL THERAPY | Facility: CLINIC | Age: 83
End: 2018-06-21
Payer: MEDICARE

## 2018-06-21 DIAGNOSIS — S42.151A GLENOID FRACTURE OF SHOULDER, RIGHT, CLOSED, INITIAL ENCOUNTER: Primary | ICD-10-CM

## 2018-06-21 DIAGNOSIS — S42.141A GLENOID FRACTURE OF SHOULDER, RIGHT, CLOSED, INITIAL ENCOUNTER: Primary | ICD-10-CM

## 2018-06-21 DIAGNOSIS — M25.511 RIGHT SHOULDER PAIN, UNSPECIFIED CHRONICITY: ICD-10-CM

## 2018-06-21 PROCEDURE — 97110 THERAPEUTIC EXERCISES: CPT | Performed by: PHYSICAL THERAPIST

## 2018-06-21 PROCEDURE — 97140 MANUAL THERAPY 1/> REGIONS: CPT | Performed by: PHYSICAL THERAPIST

## 2018-06-21 NOTE — PROGRESS NOTES
Daily Note     Today's date: 2018  Patient name: Hali Payton  : 1/15/1928  MRN: [id number]  Referring provider: Tasha Frias MD  Dx:   Encounter Diagnosis     ICD-10-CM    1  Glenoid fracture of shoulder, right, closed, initial encounter S42 141A     S42 151A    2  Right shoulder pain, unspecified chronicity M25 511                   Subjective: Patient states that she feels better today and was able to put food up on the top shelf of her cabinet without pain  Pt reports that she was sore after last visit but went away later that night  She states that she is content with her progress and wants to continue to work hard to get better  Objective: See treatment diary below  Precautions: Asthma, A-fib, COPD, Hx depression, HTN, Fall Risk    Daily Treatment Diary       Manual           PROM R shoulder   10 min 10 mins                                                              Exercise Diary             Scap Add 10"x10 :10x10  10"x10          Table Slides Flex,Abd, ER 6"x10 :05x10 5"x10          Scaption   x10  x10          Isometrics all directions             Pulleys  3'/3' 3'/3'          AAROM Wand Flex,Abd, ER  :05 x10  5"x10          Treadmill  5 min 6 mins                                                                                                                                                                                                Modalities             HP/CP prn                            Assessment: Pt continued with outlined intervention without exacerbation of pain  Pt remains guarded due to pain at mid range of passive flexion  Consider progressing progress AROM nv upon pt tolerance  Plan: Continue per plan of care

## 2018-06-26 ENCOUNTER — APPOINTMENT (OUTPATIENT)
Dept: PHYSICAL THERAPY | Facility: CLINIC | Age: 83
End: 2018-06-26
Payer: MEDICARE

## 2018-06-28 ENCOUNTER — APPOINTMENT (OUTPATIENT)
Dept: PHYSICAL THERAPY | Facility: CLINIC | Age: 83
End: 2018-06-28
Payer: MEDICARE

## 2018-07-05 ENCOUNTER — APPOINTMENT (OUTPATIENT)
Dept: PHYSICAL THERAPY | Facility: CLINIC | Age: 83
End: 2018-07-05
Payer: MEDICARE

## 2018-07-10 ENCOUNTER — APPOINTMENT (OUTPATIENT)
Dept: PHYSICAL THERAPY | Facility: CLINIC | Age: 83
End: 2018-07-10
Payer: MEDICARE

## 2018-07-10 ENCOUNTER — EVALUATION (OUTPATIENT)
Dept: PHYSICAL THERAPY | Facility: CLINIC | Age: 83
End: 2018-07-10
Payer: MEDICARE

## 2018-07-10 DIAGNOSIS — S42.141A GLENOID FRACTURE OF SHOULDER, RIGHT, CLOSED, INITIAL ENCOUNTER: Primary | ICD-10-CM

## 2018-07-10 DIAGNOSIS — S42.151A GLENOID FRACTURE OF SHOULDER, RIGHT, CLOSED, INITIAL ENCOUNTER: Primary | ICD-10-CM

## 2018-07-10 DIAGNOSIS — M25.511 RIGHT SHOULDER PAIN, UNSPECIFIED CHRONICITY: ICD-10-CM

## 2018-07-10 PROCEDURE — 97110 THERAPEUTIC EXERCISES: CPT | Performed by: PHYSICAL THERAPIST

## 2018-07-10 PROCEDURE — 97140 MANUAL THERAPY 1/> REGIONS: CPT | Performed by: PHYSICAL THERAPIST

## 2018-07-10 PROCEDURE — G8984 CARRY CURRENT STATUS: HCPCS | Performed by: PHYSICAL THERAPIST

## 2018-07-10 PROCEDURE — G8985 CARRY GOAL STATUS: HCPCS | Performed by: PHYSICAL THERAPIST

## 2018-07-10 NOTE — PROGRESS NOTES
PT Re-Evaluation     Today's date: 7/10/2018  Patient name: Peri King  : 1/15/1928  MRN: [id number]  Referring provider: Lavern Banegas MD  Dx:   Encounter Diagnosis     ICD-10-CM    1  Glenoid fracture of shoulder, right, closed, initial encounter S42 141A     S42 151A    2  Right shoulder pain, unspecified chronicity M25 511                   Assessment  Impairments: abnormal or restricted ROM, abnormal movement, activity intolerance, impaired physical strength, pain with function and poor posture     Assessment details: Peri King has attended 4 visits since initiating skilled PT and has demonstrated overall improvement in mobility, strength, and function, with a reduction in pain  Currently, they have made steady progress towards their goals, but continue to remain limited with end ranges of motion and strength  At this time, skilled physical therapy is warranted in order to address their remaining impairments and aide in return to functional activities  It is recommended that they continue to be seen 2x/week for an additional 4 weeks   Thank you for this pleasant referral!       Understanding of Dx/Px/POC: good   Prognosis: good    Goals  STG 4 Weeks:  Decrease pain at worst to 4/10- NOT MET  Improve range to R Flex 130 Abd 110 Active- NOT MET  Assess strength at end of 4 weeks- MET  Independent with HEP- PARTIALLY MET  LTG 8 Weeks:  Decrease pain at worst to minimal to nil- NOT MET  Improve range to Abd 125 active- NOT MET  Improve strength to equal to L shoulder- NOT MET  Able to perform all desired activities with minimal to nil symptom exacerbation- NOT MET      Plan  Patient would benefit from: skilled physical therapy  Referral necessary: No  Planned modality interventions: cryotherapy, thermotherapy: hydrocollator packs and biofeedback  Planned therapy interventions: abdominal trunk stabilization, activity modification, joint mobilization, manual therapy, neuromuscular re-education, patient education, postural training, strengthening, stretching, therapeutic exercise, home exercise program, functional ROM exercises, flexibility and coordination  Frequency: 2x week  Duration in weeks: 4  Plan of Care beginning date: 7/10/2018  Plan of Care expiration date: 2018  Treatment plan discussed with: patient        Subjective Evaluation    History of Present Illness  Date of onset: 2018  Mechanism of injury: Pt notes that overall she feels that she has improved  She feels as though she can do more activities overall, and that she is better able to reach and grasp with her arm  She states that laying on her side does not bother her as much as it did before  Quality of life: good    Pain  Current pain ratin  At best pain ratin  At worst pain ratin  Quality: sharp  Relieving factors: rest, relaxation and medications  Aggravating factors: lifting and overhead activity  Progression: improved      Diagnostic Tests  X-ray: abnormal (R Glenoid Fracture)  Treatments  Current treatment: medication and physical therapy  Patient Goals  Patient goals for therapy: decreased pain, increased motion, increased strength and independence with ADLs/IADLs          Objective     Active Range of Motion   Left Shoulder   Flexion: 130 degrees   Abduction: 125 degrees   External rotation 0°: Left shoulder active external rotation at 0 degrees: back of head  Internal rotation 0°: Left shoulder active internal rotation at 0 degrees: T6      Right Shoulder   Flexion: 130 degrees   Abduction: 100 degrees   External rotation 0°: Right shoulder active external rotation at 0 degrees: back of head     Internal rotation 0°: Right shoulder active internal rotation at 0 degrees: T8      Additional Active Range of Motion Details  Forward head, rounded shoulders  B/L Sensation intact to light touch C3,4,5,6,7,8,T1,T2   L/R Symmetrical No pain  UE Screen R not performed due to fx, pain with resisted elbow flexion L WFL Strong and painless  CS Screen WFL no shoulder pain repr  Palpation Pain of R acromion      Passive Range of Motion     Right Shoulder   Flexion: 165 degrees   Abduction: 90 degrees   External rotation 45°: 45 degrees   Internal rotation 45°: 15 degrees     Additional Passive Range of Motion Details          Strength/Myotome Testing     Left Shoulder     Planes of Motion   Flexion: 5   Abduction: 5   External rotation at 0°: 5   Internal rotation at 0°: 5     Right Shoulder     Planes of Motion   Flexion: 3+   Abduction: 3+   External rotation at 0°: 3+   Internal rotation at 0°: 3+       Flowsheet Rows      Most Recent Value   PT/OT G-Codes   Current Score  62   Projected Score  62   FOTO information reviewed  Yes   Assessment Type  Re-evaluation   G code set  Carrying, Moving & Handling Objects   Carrying, Moving and Handling Objects Current Status ()  CJ   Carrying, Moving and Handling Objects Goal Status ()  CI          Precautions: Asthma, A-fib, COPD, Hx depression, HTN, Fall Risk    Daily Treatment Diary       Manual 6/13 6/19 6/21          PROM R shoulder   10 min 10 mins                                                              Exercise Diary             Scap Add 10"x10 :10x10  10"x10          Table Slides Flex,Abd, ER 6"x10 :05x10 5"x10          Scaption   x10  x10          Isometrics all directions             Pulleys  3'/3' 3'/3'          AAROM Wand Flex,Abd, ER  :05 x10  5"x10          Treadmill  5 min 6 mins                                                                                                                                                                                                Modalities             HP/CP prn

## 2018-07-12 ENCOUNTER — APPOINTMENT (OUTPATIENT)
Dept: PHYSICAL THERAPY | Facility: CLINIC | Age: 83
End: 2018-07-12
Payer: MEDICARE

## 2018-07-16 ENCOUNTER — APPOINTMENT (OUTPATIENT)
Dept: PHYSICAL THERAPY | Facility: CLINIC | Age: 83
End: 2018-07-16
Payer: MEDICARE

## 2018-07-17 ENCOUNTER — OFFICE VISIT (OUTPATIENT)
Dept: PHYSICAL THERAPY | Facility: CLINIC | Age: 83
End: 2018-07-17
Payer: MEDICARE

## 2018-07-17 DIAGNOSIS — S42.151A GLENOID FRACTURE OF SHOULDER, RIGHT, CLOSED, INITIAL ENCOUNTER: Primary | ICD-10-CM

## 2018-07-17 DIAGNOSIS — S42.141A GLENOID FRACTURE OF SHOULDER, RIGHT, CLOSED, INITIAL ENCOUNTER: Primary | ICD-10-CM

## 2018-07-17 DIAGNOSIS — M25.511 RIGHT SHOULDER PAIN, UNSPECIFIED CHRONICITY: ICD-10-CM

## 2018-07-17 PROCEDURE — 97112 NEUROMUSCULAR REEDUCATION: CPT | Performed by: PHYSICAL THERAPIST

## 2018-07-17 PROCEDURE — 97110 THERAPEUTIC EXERCISES: CPT | Performed by: PHYSICAL THERAPIST

## 2018-07-17 PROCEDURE — 97140 MANUAL THERAPY 1/> REGIONS: CPT | Performed by: PHYSICAL THERAPIST

## 2018-07-17 NOTE — PROGRESS NOTES
Daily Note     Today's date: 2018  Patient name: Nayana Link  : 1/15/1928  MRN: [id number]  Referring provider: Rakan Purvis MD  Dx:   Encounter Diagnosis     ICD-10-CM    1  Glenoid fracture of shoulder, right, closed, initial encounter S42 141A     S42 151A    2  Right shoulder pain, unspecified chronicity M25 511                   Subjective: Pt reports that she is not experiencing any pain in her shoulder during her normal daily activities  She said she would only like to come to therapy a few more times, although she enjoys it a lot  Objective: See treatment diary below      Assessment: Tolerated treatment well  Patient demonstrated fatigue post treatment, exhibited good technique with therapeutic exercises, would benefit from continued PT and did well with new strengtheing exercises  1# was tried during scaption but it was too much for her  Plan: Continue per plan of care  Progress treatment as tolerated        Precautions: Asthma, A-fib, COPD, Hx depression, HTN, Fall Risk    Daily Treatment Diary       Manual 6/13 6/19 6/21 7/10 7/17        PROM R shoulder   10 min 10 mins 10' 10'                                                            Exercise Diary             Scap Add 10"x10 :10x10  10"x10 10"x10 10"x10        Table Slides Flex,Abd, ER 6"x10 :05x10 5"x10 5"x10 5"x10 FLX/ABD        Scaption   x10  x10 x10 x10        Isometrics all directions             Pulleys  3'/3' 3'/3' 3'/3' 3'/3'        AAROM Wand Flex,Abd, ER  :05 x10  5"x10 5"x10 5"x10        Treadmill  5 min 6 mins 6' 6'        Resisted EXT     3"x10 RTB        Resisted rows     3"x10 RTB                                                                                                                                                                    Modalities             HP/CP prn

## 2018-07-19 ENCOUNTER — APPOINTMENT (OUTPATIENT)
Dept: PHYSICAL THERAPY | Facility: CLINIC | Age: 83
End: 2018-07-19
Payer: MEDICARE

## 2018-07-20 ENCOUNTER — OFFICE VISIT (OUTPATIENT)
Dept: PHYSICAL THERAPY | Facility: CLINIC | Age: 83
End: 2018-07-20
Payer: MEDICARE

## 2018-07-20 DIAGNOSIS — S42.151A GLENOID FRACTURE OF SHOULDER, RIGHT, CLOSED, INITIAL ENCOUNTER: Primary | ICD-10-CM

## 2018-07-20 DIAGNOSIS — S42.141A GLENOID FRACTURE OF SHOULDER, RIGHT, CLOSED, INITIAL ENCOUNTER: Primary | ICD-10-CM

## 2018-07-20 DIAGNOSIS — M25.511 RIGHT SHOULDER PAIN, UNSPECIFIED CHRONICITY: ICD-10-CM

## 2018-07-20 PROCEDURE — 97140 MANUAL THERAPY 1/> REGIONS: CPT

## 2018-07-20 PROCEDURE — 97112 NEUROMUSCULAR REEDUCATION: CPT

## 2018-07-20 PROCEDURE — 97110 THERAPEUTIC EXERCISES: CPT

## 2018-07-20 NOTE — PROGRESS NOTES
Daily Note     Today's date: 2018  Patient name: Vero Hoyos  : 1/15/1928  MRN: [id number]  Referring provider: Teddy Mari MD  Dx:   Encounter Diagnosis     ICD-10-CM    1  Glenoid fracture of shoulder, right, closed, initial encounter S42 141A     S42 151A    2  Right shoulder pain, unspecified chronicity M25 511      Unbilled 10:50-11:00  1:1 with PTA CR 11:00-11:45  Subjective: General muscle soreness following addition of TB exercises last session  Objective: See treatment diary below      Assessment: Tolerated treatment well  No progression due to response to last session  Assess response and progress NV if appropriate  Plan: Continue per plan of care  Progress treatment as tolerated        Precautions: Asthma, A-fib, COPD, Hx depression, HTN, Fall Risk    Daily Treatment Diary       Manual 6/13 6/19 6/21 7/10 7/17 7/20       PROM R shoulder   10 min 10 mins 10' 10' 10 mins                    Exercise Diary             Scap Add 10"x10 :10x10  10"x10 10"x10 10"x10 10"x10       Table Slides Flex,Abd, ER 6"x10 :05x10 5"x10 5"x10 5"x10 FLX/ABD Flex &  abd  5"x10       Scaption   x10  x10 x10 x10 10       Isometrics all directions      NP       Pulleys  3'/3' 3'/3' 3'/3' 3'/3' 3'/3'       AAROM Wand Flex,Abd, ER  :05 x10  5"x10 5"x10 5"x10 5"x10       Treadmill  5 min 6 mins 6' 6' 6 mins       Resisted EXT     3"x10 RTB RTB  3"x10       Resisted rows     3"x10 RTB RTB  3"x10                                              Modalities             HP/CP prn

## 2018-07-24 ENCOUNTER — APPOINTMENT (OUTPATIENT)
Dept: PHYSICAL THERAPY | Facility: CLINIC | Age: 83
End: 2018-07-24
Payer: MEDICARE

## 2018-07-26 ENCOUNTER — OFFICE VISIT (OUTPATIENT)
Dept: PHYSICAL THERAPY | Facility: CLINIC | Age: 83
End: 2018-07-26
Payer: MEDICARE

## 2018-07-26 ENCOUNTER — APPOINTMENT (OUTPATIENT)
Dept: PHYSICAL THERAPY | Facility: CLINIC | Age: 83
End: 2018-07-26
Payer: MEDICARE

## 2018-07-26 DIAGNOSIS — S42.151A GLENOID FRACTURE OF SHOULDER, RIGHT, CLOSED, INITIAL ENCOUNTER: Primary | ICD-10-CM

## 2018-07-26 DIAGNOSIS — S42.141A GLENOID FRACTURE OF SHOULDER, RIGHT, CLOSED, INITIAL ENCOUNTER: Primary | ICD-10-CM

## 2018-07-26 DIAGNOSIS — M25.511 RIGHT SHOULDER PAIN, UNSPECIFIED CHRONICITY: ICD-10-CM

## 2018-07-26 PROCEDURE — 97110 THERAPEUTIC EXERCISES: CPT

## 2018-07-26 PROCEDURE — 97112 NEUROMUSCULAR REEDUCATION: CPT

## 2018-07-26 PROCEDURE — 97140 MANUAL THERAPY 1/> REGIONS: CPT

## 2018-07-26 NOTE — PROGRESS NOTES
Daily Note     Today's date: 2018  Patient name: Jose Manuel Flores  : 1/15/1928  MRN: [id number]  Referring provider: Moy Tidwell MD  Dx:   Encounter Diagnosis     ICD-10-CM    1  Glenoid fracture of shoulder, right, closed, initial encounter S42 141A     S42 151A    2  Right shoulder pain, unspecified chronicity M25 511                 Subjective: Patient states that she has noticed an improvement with reaching OH   Has some discomfort with abduction  Objective: See treatment diary below  Precautions: Asthma, A-fib, COPD, Hx depression, HTN, Fall Risk    Daily Treatment Diary       Manual 6/13 6/19 6/21 7/10 7/17 7/20 7/26      PROM R shoulder   10 min 10 mins 10' 10' 10 mins 10 min                    Exercise Diary             Scap Add 10"x10 :10x10  10"x10 10"x10 10"x10 10"x10 :10x10      Table Slides Flex,Abd, ER 6"x10 :05x10 5"x10 5"x10 5"x10 FLX/ABD Flex &  abd  5"x10 :10x10      Scaption   x10  x10 x10 x10 10 10      Isometrics all directions      NP       Pulleys  3'/3' 3'/3' 3'/3' 3'/3' 3'/3' 3'/3'      AAROM Wand Flex,Abd, ER  :05 x10  5"x10 5"x10 5"x10 5"x10 5"x10      Treadmill  5 min 6 mins 6' 6' 6 mins 6 min       Resisted EXT     3"x10 RTB RTB  3"x10 RTB x 10       Resisted rows     3"x10 RTB RTB  3"x10 RTB x10                                              Modalities             HP/CP prn                            Assessment: Tolerated treatment fair  Patient demonstrated fatigue post treatment      Plan: Continue per plan of care

## 2018-07-31 ENCOUNTER — APPOINTMENT (OUTPATIENT)
Dept: LAB | Facility: CLINIC | Age: 83
End: 2018-07-31
Payer: MEDICARE

## 2018-07-31 ENCOUNTER — TRANSCRIBE ORDERS (OUTPATIENT)
Dept: LAB | Facility: CLINIC | Age: 83
End: 2018-07-31

## 2018-07-31 ENCOUNTER — OFFICE VISIT (OUTPATIENT)
Dept: PHYSICAL THERAPY | Facility: CLINIC | Age: 83
End: 2018-07-31
Payer: MEDICARE

## 2018-07-31 DIAGNOSIS — M25.511 RIGHT SHOULDER PAIN, UNSPECIFIED CHRONICITY: ICD-10-CM

## 2018-07-31 DIAGNOSIS — I50.9 CONGESTIVE HEART FAILURE, UNSPECIFIED HF CHRONICITY, UNSPECIFIED HEART FAILURE TYPE (HCC): ICD-10-CM

## 2018-07-31 DIAGNOSIS — I50.9 CONGESTIVE HEART FAILURE, UNSPECIFIED HF CHRONICITY, UNSPECIFIED HEART FAILURE TYPE (HCC): Primary | ICD-10-CM

## 2018-07-31 DIAGNOSIS — S42.141A GLENOID FRACTURE OF SHOULDER, RIGHT, CLOSED, INITIAL ENCOUNTER: Primary | ICD-10-CM

## 2018-07-31 DIAGNOSIS — S42.151A GLENOID FRACTURE OF SHOULDER, RIGHT, CLOSED, INITIAL ENCOUNTER: Primary | ICD-10-CM

## 2018-07-31 LAB
ANION GAP SERPL CALCULATED.3IONS-SCNC: 6 MMOL/L (ref 4–13)
BUN SERPL-MCNC: 35 MG/DL (ref 5–25)
CALCIUM SERPL-MCNC: 8.9 MG/DL (ref 8.3–10.1)
CHLORIDE SERPL-SCNC: 105 MMOL/L (ref 100–108)
CO2 SERPL-SCNC: 32 MMOL/L (ref 21–32)
CREAT SERPL-MCNC: 1.15 MG/DL (ref 0.6–1.3)
GFR SERPL CREATININE-BSD FRML MDRD: 42 ML/MIN/1.73SQ M
GLUCOSE SERPL-MCNC: 85 MG/DL (ref 65–140)
NT-PROBNP SERPL-MCNC: 2650 PG/ML
POTASSIUM SERPL-SCNC: 4 MMOL/L (ref 3.5–5.3)
SODIUM SERPL-SCNC: 143 MMOL/L (ref 136–145)

## 2018-07-31 PROCEDURE — G8985 CARRY GOAL STATUS: HCPCS | Performed by: PHYSICAL THERAPIST

## 2018-07-31 PROCEDURE — 36415 COLL VENOUS BLD VENIPUNCTURE: CPT

## 2018-07-31 PROCEDURE — G8986 CARRY D/C STATUS: HCPCS | Performed by: PHYSICAL THERAPIST

## 2018-07-31 PROCEDURE — 83880 ASSAY OF NATRIURETIC PEPTIDE: CPT

## 2018-07-31 PROCEDURE — 80048 BASIC METABOLIC PNL TOTAL CA: CPT

## 2018-07-31 PROCEDURE — 97110 THERAPEUTIC EXERCISES: CPT | Performed by: PHYSICAL THERAPIST

## 2018-07-31 PROCEDURE — 97140 MANUAL THERAPY 1/> REGIONS: CPT | Performed by: PHYSICAL THERAPIST

## 2018-07-31 NOTE — PROGRESS NOTES
Daily Note     Today's date: 2018  Patient name: Richelle Angel  : 1/15/1928  MRN: [id number]  Referring provider: Gladys Ndiaye MD  Dx:   Encounter Diagnosis     ICD-10-CM    1  Glenoid fracture of shoulder, right, closed, initial encounter S42 141A     S42 151A    2  Right shoulder pain, unspecified chronicity M25 511                   Subjective: Pt notes that she is doing well today  She is feeling a bit tired because she just finished eating  Objective: See treatment diary below      Assessment: Tolerated treatment well  Patient demonstrated fatigue post treatment, exhibited good technique with therapeutic exercises, would benefit from continued PT and had slight pain with passive abduction today  Added gentle inferior glide with improved tolerance  Plan: Continue per plan of care  Progress treatment as tolerated          Precautions: Asthma, A-fib, COPD, Hx depression, HTN, Fall Risk    Daily Treatment Diary       Manual 6/13 6/19 6/21 7/10 7/17 7/20 7/26 7/31     PROM R shoulder   10 min 10 mins 10' 10' 10 mins 10 min  10'                  Exercise Diary             Scap Add 10"x10 :10x10  10"x10 10"x10 10"x10 10"x10 :10x10 10"x10     Table Slides Flex,Abd, ER 6"x10 :05x10 5"x10 5"x10 5"x10 FLX/ABD Flex &  abd  5"x10 :10x10 10"x10     Scaption   x10  x10 x10 x10 10 10 10x     Isometrics all directions      NP       Pulleys  3'/3' 3'/3' 3'/3' 3'/3' 3'/3' 3'/3' 3'/3'     AAROM Wand Flex,Abd, ER  :05 x10  5"x10 5"x10 5"x10 5"x10 5"x10 5"x10     Treadmill  5 min 6 mins 6' 6' 6 mins 6 min  6'     Resisted EXT     3"x10 RTB RTB  3"x10 RTB x 10  RTB 10x     Resisted rows     3"x10 RTB RTB  3"x10 RTB x10  RTB 10x                                            Modalities             HP/CP prn

## 2018-08-16 NOTE — PROGRESS NOTES
PT Discharge    Today's date: 2018  Patient name: Debora Rivas  : 1/15/1928  MRN: [id number]  Referring provider: Justice Sweet MD  Dx:   Encounter Diagnosis     ICD-10-CM    1  Glenoid fracture of shoulder, right, closed, initial encounter S42 141A     S42 151A    2  Right shoulder pain, unspecified chronicity M25 511        Start Time: 1400  Stop Time: 1440  Total time in clinic (min): 40 minutes    Assessment/Plan  Pt has not been present since 18  Pt's chart will be DC in compliance of facility policy as all Charts are DC within 30 days of last scheduled visit          Subjective    Objective    Flowsheet Rows      Most Recent Value   PT/OT G-Codes   Current Score  62   Projected Score  62

## 2018-11-19 ENCOUNTER — OFFICE VISIT (OUTPATIENT)
Dept: CARDIOLOGY CLINIC | Facility: CLINIC | Age: 83
End: 2018-11-19
Payer: MEDICARE

## 2018-11-19 VITALS
DIASTOLIC BLOOD PRESSURE: 50 MMHG | HEIGHT: 60 IN | HEART RATE: 73 BPM | SYSTOLIC BLOOD PRESSURE: 142 MMHG | BODY MASS INDEX: 21.99 KG/M2 | WEIGHT: 112 LBS

## 2018-11-19 DIAGNOSIS — I48.20 CHRONIC ATRIAL FIBRILLATION (HCC): ICD-10-CM

## 2018-11-19 DIAGNOSIS — I10 ESSENTIAL (PRIMARY) HYPERTENSION: Primary | ICD-10-CM

## 2018-11-19 DIAGNOSIS — E78.00 PURE HYPERCHOLESTEROLEMIA: ICD-10-CM

## 2018-11-19 DIAGNOSIS — I34.0 NON-RHEUMATIC MITRAL REGURGITATION: ICD-10-CM

## 2018-11-19 DIAGNOSIS — I48.91 ATRIAL FIBRILLATION, UNSPECIFIED TYPE (HCC): ICD-10-CM

## 2018-11-19 PROCEDURE — 99214 OFFICE O/P EST MOD 30 MIN: CPT | Performed by: INTERNAL MEDICINE

## 2018-11-19 PROCEDURE — 93000 ELECTROCARDIOGRAM COMPLETE: CPT | Performed by: INTERNAL MEDICINE

## 2018-11-19 RX ORDER — ACETAMINOPHEN 325 MG/1
650 TABLET ORAL EVERY 6 HOURS PRN
COMMUNITY
End: 2020-01-01 | Stop reason: HOSPADM

## 2018-11-19 RX ORDER — GLUCOSAMINE HCL 500 MG
100 TABLET ORAL DAILY
COMMUNITY
End: 2020-01-01 | Stop reason: HOSPADM

## 2018-11-19 RX ORDER — LOSARTAN POTASSIUM 25 MG/1
25 TABLET ORAL DAILY
COMMUNITY
End: 2020-01-01 | Stop reason: DRUGHIGH

## 2018-11-19 NOTE — PROGRESS NOTES
Cardiology Follow Up    [place]  1/15/1928  [id number]  [address]    1  Essential (primary) hypertension     2  Atrial fibrillation, unspecified type ([facility] )     3  Non-rheumatic mitral regurgitation     4  Pure hypercholesterolemia     5  Chronic atrial fibrillation (HCC)         Interval History:  Patient is here for a follow-up visit  She was most recently seen by me in the spring time  Her most recent echocardiogram was done March of last year demonstrated preserved LV systolic function with mild LVH  She was noted to have mild mitral regurgitation with mild aortic valve stenosis and moderate regurgitation  Severe tricuspid regurgitation was noted  She has been feeling well  She has had no chest pain or significant dyspnea  EKG today demonstrated atrial fibrillation with a controlled ventricular response  Patient Active Problem List   Diagnosis    COPD with acute exacerbation ([place] [address] )    Essential hypertension    Chronic atrial fibrillation (HCC)    Hyperlipidemia    Influenza A H1N1 infection     Past Medical History:   Diagnosis Date    Arrhythmia     Asthma     Atrial fibrillation ([place] [address] )     Cardiac disease     COPD (chronic obstructive pulmonary disease) ([place] [address] )     Dementia     Depression     GERD (gastroesophageal reflux disease)     History of heart artery stent     Hyperlipidemia     Hypertension      Social History     Social History    Marital status: /Civil Union     Spouse name: N/A    Number of children: N/A    Years of education: N/A     Occupational History    Not on file  Social History Main Topics    Smoking status: Never Smoker    Smokeless tobacco: Never Used    Alcohol use No    Drug use: No    Sexual activity: Not on file     Other Topics Concern    Not on file     Social History Narrative    No narrative on file      No family history on file    No past surgical history on file      Current Outpatient Prescriptions:     apixaban (ELIQUIS) 2 5 mg, Take 2 5 mg by mouth 2 (two) times a day, Disp: , Rfl:     atorvastatin (LIPITOR) 20 mg tablet, Take 20 mg by mouth daily, Disp: , Rfl:     COMBIVENT RESPIMAT inhaler, inhale 1 puff by mouth four times a day, Disp: , Rfl: 1    digoxin (LANOXIN) 0 125 mg tablet, Take 125 mcg by mouth daily, Disp: , Rfl:     donepezil (ARICEPT) 10 mg tablet, Take 10 mg by mouth daily at bedtime, Disp: , Rfl:     furosemide (LASIX) 40 mg tablet, Take 40 mg by mouth daily, Disp: , Rfl: 0    ipratropium (ATROVENT) 0 06 % nasal spray, , Disp: , Rfl: 1    ipratropium-albuterol (COMBIVENT)  mcg/act inhaler, Inhale 1 puff 2 (two) times a day, Disp: , Rfl:     isosorbide dinitrate (ISORDIL) 30 mg tablet, Take 30 mg by mouth daily, Disp: , Rfl:     memantine (NAMENDA) 10 mg tablet, Take 10 mg by mouth daily, Disp: , Rfl: 0    metoprolol tartrate (LOPRESSOR) 25 mg tablet, Take 25 mg by mouth every morning, Disp: , Rfl:     metoprolol tartrate (LOPRESSOR) 50 mg tablet, Take 50 mg by mouth every evening, Disp: , Rfl:     mirtazapine (REMERON) 7 5 MG tablet, Take 7 5 mg by mouth daily at bedtime, Disp: , Rfl:     Multiple Vitamins-Minerals (PRESERVISION AREDS 2+MULTI VIT PO), Take 1 capsule by mouth 2 (two) times a day, Disp: , Rfl:     pantoprazole (PROTONIX) 40 mg tablet, Take 40 mg by mouth daily, Disp: , Rfl:     rOPINIRole (REQUIP) 0 5 mg tablet, take 1 tablet by mouth at bedtime for 2 weeks then INCREASE TO 2 TABLETS FOR RESTLESS LEGS, Disp: 60 tablet, Rfl: 2    Specialty Vitamins Products (MAGNESIUM, AMINO ACID CHELATE,) 133 MG tablet, Take 1 tablet by mouth daily, Disp: , Rfl:     traMADol (ULTRAM) 50 mg tablet, Take 1 tablet (50 mg total) by mouth every 6 (six) hours as needed for severe pain, Disp: 15 tablet, Rfl: 0  Allergies   Allergen Reactions    Asa [Aspirin]     Shellfish-Derived Products      IV dye    Sulfa Antibiotics     Thiazide-Type Diuretics        Labs:not applicable  Imaging: No results found  Review of Systems:  Review of Systems   All other systems reviewed and are negative  Physical Exam:  There were no vitals taken for this visit  Physical Exam   Constitutional: She is oriented to person, place, and time  She appears well-developed and well-nourished  HENT:   Head: Normocephalic and atraumatic  Eyes: Pupils are equal, round, and reactive to light  Conjunctivae and EOM are normal    Neck: Normal range of motion  Neck supple  Cardiovascular: Normal rate  Murmur heard  Pulmonary/Chest: Effort normal and breath sounds normal    Neurological: She is alert and oriented to person, place, and time  Skin: Skin is warm and dry  Psychiatric: She has a normal mood and affect  Vitals reviewed  Discussion/Summary:I will continue the patient's present medical regimen  The patient appears well compensated  I have asked the patient to call if there is a problem in the interim otherwise I will see the patient in six months time

## 2019-03-27 ENCOUNTER — APPOINTMENT (EMERGENCY)
Dept: RADIOLOGY | Facility: HOSPITAL | Age: 84
End: 2019-03-27
Payer: MEDICARE

## 2019-03-27 ENCOUNTER — HOSPITAL ENCOUNTER (EMERGENCY)
Facility: HOSPITAL | Age: 84
Discharge: HOME | End: 2019-03-27
Attending: EMERGENCY MEDICINE | Admitting: EMERGENCY MEDICINE
Payer: MEDICARE

## 2019-03-27 VITALS
RESPIRATION RATE: 18 BRPM | SYSTOLIC BLOOD PRESSURE: 167 MMHG | TEMPERATURE: 97.9 F | OXYGEN SATURATION: 96 % | WEIGHT: 113.98 LBS | HEIGHT: 61 IN | DIASTOLIC BLOOD PRESSURE: 72 MMHG | HEART RATE: 90 BPM | BODY MASS INDEX: 21.52 KG/M2

## 2019-03-27 DIAGNOSIS — J20.9 ACUTE BRONCHITIS WITH BRONCHOSPASM: Primary | ICD-10-CM

## 2019-03-27 LAB
ANION GAP SERPL CALCULATED.3IONS-SCNC: 8 MMOL/L (ref 4–13)
APTT PPP: 33 SECONDS (ref 26–38)
ATRIAL RATE: 258 BPM
BASOPHILS # BLD AUTO: 0.07 THOUSANDS/ΜL (ref 0–0.1)
BASOPHILS NFR BLD AUTO: 1 % (ref 0–1)
BUN SERPL-MCNC: 34 MG/DL (ref 5–25)
CALCIUM SERPL-MCNC: 9.3 MG/DL (ref 8.3–10.1)
CHLORIDE SERPL-SCNC: 103 MMOL/L (ref 100–108)
CO2 SERPL-SCNC: 31 MMOL/L (ref 21–32)
CREAT SERPL-MCNC: 1.24 MG/DL (ref 0.6–1.3)
EOSINOPHIL # BLD AUTO: 0.25 THOUSAND/ΜL (ref 0–0.61)
EOSINOPHIL NFR BLD AUTO: 3 % (ref 0–6)
ERYTHROCYTE [DISTWIDTH] IN BLOOD BY AUTOMATED COUNT: 14.3 % (ref 11.6–15.1)
GFR SERPL CREATININE-BSD FRML MDRD: 38 ML/MIN/1.73SQ M
GLUCOSE SERPL-MCNC: 85 MG/DL (ref 65–140)
HCT VFR BLD AUTO: 43.3 % (ref 34.8–46.1)
HGB BLD-MCNC: 13.9 G/DL (ref 11.5–15.4)
IMM GRANULOCYTES # BLD AUTO: 0.04 THOUSAND/UL (ref 0–0.2)
IMM GRANULOCYTES NFR BLD AUTO: 0 % (ref 0–2)
INR PPP: 1.2 (ref 0.86–1.17)
LYMPHOCYTES # BLD AUTO: 2.63 THOUSANDS/ΜL (ref 0.6–4.47)
LYMPHOCYTES NFR BLD AUTO: 27 % (ref 14–44)
MCH RBC QN AUTO: 32 PG (ref 26.8–34.3)
MCHC RBC AUTO-ENTMCNC: 32.1 G/DL (ref 31.4–37.4)
MCV RBC AUTO: 100 FL (ref 82–98)
MONOCYTES # BLD AUTO: 0.98 THOUSAND/ΜL (ref 0.17–1.22)
MONOCYTES NFR BLD AUTO: 10 % (ref 4–12)
NEUTROPHILS # BLD AUTO: 5.73 THOUSANDS/ΜL (ref 1.85–7.62)
NEUTS SEG NFR BLD AUTO: 59 % (ref 43–75)
NRBC BLD AUTO-RTO: 0 /100 WBCS
NT-PROBNP SERPL-MCNC: 2403 PG/ML
P AXIS: 0 DEGREES
PLATELET # BLD AUTO: 250 THOUSANDS/UL (ref 149–390)
PMV BLD AUTO: 10.4 FL (ref 8.9–12.7)
POTASSIUM SERPL-SCNC: 3.9 MMOL/L (ref 3.5–5.3)
PROTHROMBIN TIME: 14.9 SECONDS (ref 11.8–14.2)
QRS AXIS: 31 DEGREES
QRSD INTERVAL: 94 MS
QT INTERVAL: 376 MS
QTC INTERVAL: 417 MS
RBC # BLD AUTO: 4.35 MILLION/UL (ref 3.81–5.12)
SODIUM SERPL-SCNC: 142 MMOL/L (ref 136–145)
T WAVE AXIS: 48 DEGREES
TROPONIN I SERPL-MCNC: <0.02 NG/ML
VENTRICULAR RATE: 74 BPM
WBC # BLD AUTO: 9.7 THOUSAND/UL (ref 4.31–10.16)

## 2019-03-27 PROCEDURE — 71046 X-RAY EXAM CHEST 2 VIEWS: CPT

## 2019-03-27 PROCEDURE — 83880 ASSAY OF NATRIURETIC PEPTIDE: CPT | Performed by: EMERGENCY MEDICINE

## 2019-03-27 PROCEDURE — 99284 EMERGENCY DEPT VISIT MOD MDM: CPT

## 2019-03-27 PROCEDURE — 80048 BASIC METABOLIC PNL TOTAL CA: CPT | Performed by: EMERGENCY MEDICINE

## 2019-03-27 PROCEDURE — 36415 COLL VENOUS BLD VENIPUNCTURE: CPT | Performed by: EMERGENCY MEDICINE

## 2019-03-27 PROCEDURE — 84484 ASSAY OF TROPONIN QUANT: CPT | Performed by: EMERGENCY MEDICINE

## 2019-03-27 PROCEDURE — 96374 THER/PROPH/DIAG INJ IV PUSH: CPT

## 2019-03-27 PROCEDURE — 85610 PROTHROMBIN TIME: CPT | Performed by: EMERGENCY MEDICINE

## 2019-03-27 PROCEDURE — 85730 THROMBOPLASTIN TIME PARTIAL: CPT | Performed by: EMERGENCY MEDICINE

## 2019-03-27 PROCEDURE — 85025 COMPLETE CBC W/AUTO DIFF WBC: CPT | Performed by: EMERGENCY MEDICINE

## 2019-03-27 PROCEDURE — 93010 ELECTROCARDIOGRAM REPORT: CPT | Performed by: INTERNAL MEDICINE

## 2019-03-27 PROCEDURE — 93005 ELECTROCARDIOGRAM TRACING: CPT

## 2019-03-27 PROCEDURE — 94640 AIRWAY INHALATION TREATMENT: CPT

## 2019-03-27 RX ORDER — LEVALBUTEROL 1.25 MG/.5ML
1.25 SOLUTION, CONCENTRATE RESPIRATORY (INHALATION) ONCE
Status: COMPLETED | OUTPATIENT
Start: 2019-03-27 | End: 2019-03-27

## 2019-03-27 RX ORDER — PREDNISONE 10 MG/1
TABLET ORAL
Qty: 30 TABLET | Refills: 0 | Status: SHIPPED | OUTPATIENT
Start: 2019-03-28 | End: 2020-01-01 | Stop reason: ALTCHOICE

## 2019-03-27 RX ORDER — ALBUTEROL SULFATE 90 UG/1
2 AEROSOL, METERED RESPIRATORY (INHALATION) ONCE
Status: COMPLETED | OUTPATIENT
Start: 2019-03-27 | End: 2019-03-27

## 2019-03-27 RX ORDER — METHYLPREDNISOLONE SODIUM SUCCINATE 125 MG/2ML
125 INJECTION, POWDER, LYOPHILIZED, FOR SOLUTION INTRAMUSCULAR; INTRAVENOUS ONCE
Status: COMPLETED | OUTPATIENT
Start: 2019-03-27 | End: 2019-03-27

## 2019-03-27 RX ADMIN — METHYLPREDNISOLONE SODIUM SUCCINATE 125 MG: 125 INJECTION, POWDER, FOR SOLUTION INTRAMUSCULAR; INTRAVENOUS at 14:23

## 2019-03-27 RX ADMIN — IPRATROPIUM BROMIDE 0.5 MG: 0.5 SOLUTION RESPIRATORY (INHALATION) at 14:13

## 2019-03-27 RX ADMIN — ALBUTEROL SULFATE 2 PUFF: 90 AEROSOL, METERED RESPIRATORY (INHALATION) at 15:34

## 2019-03-27 RX ADMIN — LEVALBUTEROL HYDROCHLORIDE 1.25 MG: 1.25 SOLUTION, CONCENTRATE RESPIRATORY (INHALATION) at 14:13

## 2019-03-27 NOTE — ED PROVIDER NOTES
History  Chief Complaint   Patient presents with    Cough     patient reports productive cough for 1 week  sob with exertion and feeling more tired than usual  [facility] and has been taking abx as prescribed  51-year-old female presents the emergency department with her  for evaluation of cough  Patient states that she has had a cough that is productive of thick brown, oapwcswgi-[id number] sputum for the past 1 week  She also reports feeling fatigued  She denies associated fever  No chest pain  She does have some mild dyspnea on exertion but has been able to ambulate without difficulty  Patient's  had a similar cough a few weeks ago and recovered  Patient was evaluated at Urgent Care over the weekend and was prescribed a twice daily dosing antibiotic  She has been taking it but does not feel that she has improved though she does not feel that she has worsened  Patient denies history of asthma or COPD however upon review of her medical records it appears that she does have COPD  Patient does have a history of dementia      History provided by:  Patient, medical records and spouse  History limited by:  Dementia   used: No    Cough   Cough characteristics:  Productive  Sputum characteristics:  Letha Gutierrez  Severity:  Moderate  Onset quality:  Gradual  Duration:  1 week  Timing:  Constant  Progression:  Unchanged  Chronicity:  New  Smoker: no    Context: sick contacts    Relieved by:  Nothing  Worsened by:  Nothing  Ineffective treatments: antibiotic  Associated symptoms: wheezing    Associated symptoms: no chest pain, no chills, no ear fullness, no ear pain, no fever and no shortness of breath    Risk factors: no recent infection        Prior to Admission Medications   Prescriptions Last Dose Informant Patient Reported? Taking?    COMBIVENT RESPIMAT inhaler  Self Yes No   Sig: inhale 1 puff by mouth four times a day   Multiple Vitamins-Minerals (PRESERVISION AREDS 2+MULTI VIT PO)  Self Yes No   Sig: Take 1 capsule by mouth 2 (two) times a day   Specialty Vitamins Products (MAGNESIUM, AMINO ACID CHELATE,) 133 MG tablet  Self Yes No   Sig: Take 1 tablet by mouth daily   acetaminophen (TYLENOL) 325 mg tablet  Self Yes No   Sig: Take 650 mg by mouth every 6 (six) hours as needed for mild pain   apixaban (ELIQUIS) 2 5 mg  Self Yes No   Sig: Take 2 5 mg by mouth 2 (two) times a day   atorvastatin (LIPITOR) 20 mg tablet  Self Yes No   Sig: Take 20 mg by mouth daily   co-enzyme Q-10 30 MG capsule  Self Yes No   Sig: Take 100 mg by mouth 3 (three) times a day   digoxin (LANOXIN) 0 125 mg tablet  Self Yes No   Sig: Take 125 mcg by mouth daily   donepezil (ARICEPT) 10 mg tablet  Self Yes No   Sig: Take 10 mg by mouth daily at bedtime   furosemide (LASIX) 40 mg tablet  Self Yes No   Sig: Take 40 mg by mouth daily   ipratropium (ATROVENT) 0 06 % nasal spray  Self Yes No   ipratropium-albuterol (COMBIVENT)  mcg/act inhaler  Self Yes No   Sig: Inhale 1 puff 2 (two) times a day   isosorbide dinitrate (ISORDIL) 30 mg tablet  Self Yes No   Sig: Take 30 mg by mouth daily   losartan (COZAAR) 25 mg tablet  Self Yes No   Sig: Take 25 mg by mouth daily   memantine (NAMENDA) 10 mg tablet  Self Yes No   Sig: Take 10 mg by mouth daily   metoprolol tartrate (LOPRESSOR) 25 mg tablet  Self Yes No   Sig: Take 25 mg by mouth every morning   metoprolol tartrate (LOPRESSOR) 50 mg tablet  Self Yes No   Sig: Take 50 mg by mouth every evening   mirtazapine (REMERON) 7 5 MG tablet  Self Yes No   Sig: Take 7 5 mg by mouth daily at bedtime   pantoprazole (PROTONIX) 40 mg tablet  Self Yes No   Sig: Take 40 mg by mouth daily   rOPINIRole (REQUIP) 0 5 mg tablet  Self No No   Sig: take 1 tablet by mouth at bedtime for 2 weeks then INCREASE TO 2 TABLETS FOR RESTLESS LEGS   traMADol (ULTRAM) 50 mg tablet  Self No No   Sig: Take 1 tablet (50 mg total) by mouth every 6 (six) hours as needed for severe pain      Facility-Administered Medications: None       Past Medical History:   Diagnosis Date    Arrhythmia     Asthma     Atrial fibrillation ([address] )     Cardiac disease     [facility]     Dementia     Depression     GERD (gastroesophageal reflux disease)     History of heart artery stent     Hyperlipidemia     Hypertension        History reviewed  No pertinent surgical history  History reviewed  No pertinent family history  I have reviewed and agree with the history as documented  Social History     Tobacco Use    Smoking status: Never Smoker    Smokeless tobacco: Never Used   Substance Use Topics    Alcohol use: No    Drug use: No        Review of Systems   Constitutional: Negative for appetite change, chills, fever and unexpected weight change  HENT: Negative for ear pain  Respiratory: Positive for cough and wheezing  Negative for shortness of breath  Cardiovascular: Negative for chest pain, palpitations and leg swelling  Gastrointestinal: Negative for abdominal pain, nausea and vomiting  All other systems reviewed and are negative  Physical Exam  Physical Exam   Constitutional: She is oriented to person, place, and time  She appears well-developed and well-nourished  No distress  Appears elderly and frail   HENT:   Head: Normocephalic  Nose: Nose normal    Mouth/Throat: Oropharynx is clear and moist  No oropharyngeal exudate  Eyes: Pupils are equal, round, and reactive to light  Conjunctivae and EOM are normal    Neck: Normal range of motion  Neck supple  Cardiovascular: Normal rate and intact distal pulses  An irregularly irregular rhythm present  Murmur heard  Pulmonary/Chest: Effort normal  No tachypnea  No respiratory distress  She has wheezes  She has rhonchi  Abdominal: Soft  Bowel sounds are normal  She exhibits no distension  There is no tenderness  There is no rebound and no guarding     Musculoskeletal: Normal range of motion  She exhibits no edema, tenderness or deformity  Back:    Lymphadenopathy:     She has no cervical adenopathy  Neurological: She is alert and oriented to person, place, and time  She has normal strength and normal reflexes  No cranial nerve deficit or sensory deficit  She exhibits normal muscle tone  Coordination and gait normal    Skin: Skin is warm, dry and intact  No rash noted  Psychiatric: She has a normal mood and affect  Her behavior is normal  Judgment and thought content normal    Nursing note and vitals reviewed        Vital Signs  ED Triage Vitals   Temperature Pulse Respirations Blood Pressure SpO2   03/27/19 1353 03/27/19 1341 03/27/19 1341 03/27/19 1341 03/27/19 1341   97 9 °F (36 6 °C) 90 18 (!) 231/91 96 %      Temp Source Heart Rate Source Patient Position - Orthostatic VS BP Location FiO2 (%)   03/27/19 1353 03/27/19 1341 03/27/19 1341 03/27/19 1341 --   Oral Monitor Sitting Right arm       Pain Score       03/27/19 1341       No Pain           Vitals:    03/27/19 1341 03/27/19 1503   BP: (!) 231/91 167/72   Pulse: 90    Patient Position - Orthostatic VS: Sitting          Visual Acuity      ED Medications  Medications   methylPREDNISolone sodium succinate (Solu-MEDROL) injection 125 mg (125 mg Intravenous Given 3/27/19 1423)   levalbuterol (XOPENEX) inhalation solution 1 25 mg (1 25 mg Nebulization Given 3/27/19 1413)   ipratropium (ATROVENT) 0 02 % inhalation solution 0 5 mg (0 5 mg Nebulization Given 3/27/19 1413)   albuterol (PROVENTIL HFA,VENTOLIN HFA) inhaler 2 puff (2 puffs Inhalation Given 3/27/19 1534)       Diagnostic Studies  Results Reviewed     Procedure Component Value Units Date/Time    Basic metabolic panel [[id number]]  (Abnormal) Collected:  03/27/19 1421    Lab Status:  Final result Specimen:  Blood from Arm, Left Updated:  03/27/19 1458     Sodium 142 mmol/L      Potassium 3 9 mmol/L      Chloride 103 mmol/L      CO2 31 mmol/L      ANION GAP 8 mmol/L      BUN 34 mg/dL      Creatinine 1 24 mg/dL      Glucose 85 mg/dL      Calcium 9 3 mg/dL      eGFR 38 ml/min/1 73sq m     Narrative:       National Kidney Disease Education Program recommendations are as follows:  GFR calculation is accurate only with a steady state creatinine  Chronic Kidney disease less than 60 ml/min/1 73 sq  meters  Kidney failure less than 15 ml/min/1 73 sq  meters      B-type natriuretic peptide [[id number]]  (Abnormal) Collected:  03/27/19 1421    Lab Status:  Final result Specimen:  Blood from Arm, Left Updated:  03/27/19 1458     NT-proBNP 2,403 pg/mL     Troponin I [[id number]]  (Normal) Collected:  03/27/19 1421    Lab Status:  Final result Specimen:  Blood from Arm, Left Updated:  03/27/19 1454     Troponin I <0 02 ng/mL     Protime-INR [[id number]]  (Abnormal) Collected:  03/27/19 1421    Lab Status:  Final result Specimen:  Blood from Arm, Left Updated:  03/27/19 1451     Protime 14 9 seconds      INR 1 20    APTT [[id number]]  (Normal) Collected:  03/27/19 1421    Lab Status:  Final result Specimen:  Blood from Arm, Left Updated:  03/27/19 1451     PTT 33 seconds     CBC and differential [[id number]]  (Abnormal) Collected:  03/27/19 1421    Lab Status:  Final result Specimen:  Blood from Arm, Left Updated:  03/27/19 1433     WBC 9 70 Thousand/uL      RBC 4 35 Million/uL      Hemoglobin 13 9 g/dL      Hematocrit 43 3 %       fL      MCH 32 0 pg      MCHC 32 1 g/dL      RDW 14 3 %      MPV 10 4 fL      Platelets 433 Thousands/uL      nRBC 0 /100 WBCs      Neutrophils Relative 59 %      Immat GRANS % 0 %      Lymphocytes Relative 27 %      Monocytes Relative 10 %      Eosinophils Relative 3 %      Basophils Relative 1 %      Neutrophils Absolute 5 73 Thousands/µL      Immature Grans Absolute 0 04 Thousand/uL      Lymphocytes Absolute 2 63 Thousands/µL      Monocytes Absolute 0 98 Thousand/µL      Eosinophils Absolute 0 25 Thousand/µL      Basophils Absolute 0 07 Thousands/µL XR chest 2 views   ED Interpretation by Heidi Ayala DO (03/27 1514)   Cardiomegaly                 Procedures  ECG 12 Lead Documentation  Date/Time: 3/27/2019 4:06 PM  Performed by: Heidi Ayala DO  Authorized by: Heidi Ayala DO     Indications / Diagnosis:  Cough, dyspnea  ECG reviewed by me, the ED Provider: yes    Patient location:  ED  Previous ECG:     Previous ECG:  Compared to current    Comparison ECG info:  December 2017  Interpretation:     Interpretation: abnormal    Rate:     ECG rate:  74    ECG rate assessment: normal    Rhythm:     Rhythm: atrial fibrillation    Ectopy:     Ectopy: none    QRS:     QRS axis:  Normal  Conduction:     Conduction: normal    ST segments:     ST segments:  Non-specific           Phone Contacts  ED Phone Contact    ED Course  ED Course as of Mar 27 1607   Wed Mar 27, 2019   1505 Creatinine: 1 24   1522 Patient feels better after receiving nebulizer treatment and Solu-Medrol  Her blood work is stable, chest x-ray demonstrates cardiomegaly but no acute infiltrate noted -reviewed by me  She feels well for discharge and is anxiously awaiting the removal of her IV    Will start prednisone taper tomorrow and provide patient with albuterol inhaler                                  MDM  Number of Diagnoses or Management Options  Acute bronchitis with bronchospasm: new and requires workup     Amount and/or Complexity of Data Reviewed  Clinical lab tests: ordered and reviewed  Tests in the radiology section of CPT®: reviewed and ordered  Tests in the medicine section of CPT®: ordered and reviewed  Decide to obtain previous medical records or to obtain history from someone other than the patient: yes  Obtain history from someone other than the patient: yes  Independent visualization of images, tracings, or specimens: yes    Risk of Complications, Morbidity, and/or Mortality  General comments: 19-year-old female presents to the emergency department for evaluation of 1 week of cough  Patient is not having improvement with oral antibiotics that she has been taking for the past 4 days  On exam patient is very wheezy  I do suspect she is having exacerbation of her COPD  Her oxygen level is stable, she is in no acute distress does not have increased work of breathing  She does feel better after receiving nebulizer treatment and Solu-Medrol  Patient's blood work appears to be baseline, she has a chronically elevated BNP  No sign of acute congestive heart failure on chest x-ray  Patient will continue out the prescription for the antibiotics that she had filled over the weekend  Patient feels well for discharge  Discussed signs symptoms to return to the emergency department    Patient Progress  Patient progress: improved      Disposition  Final diagnoses:   Acute bronchitis with bronchospasm     Time reflects when diagnosis was documented in both MDM as applicable and the Disposition within this note     Time User Action Codes Description Comment    3/27/2019  3:25 PM Julieta Luther [J20 9] Acute bronchitis with bronchospasm     3/27/2019  3:25 PM Julieta Luther Add [J44 1] COPD with acute exacerbation ([facility] [address] )     3/27/2019  3:25 PM Julieta Luther Remove [J44 1] COPD with acute exacerbation [facility])       ED Disposition     ED Disposition Condition Date/Time Comment    Discharge Stable Wed Mar 27, 2019  3:24 PM Nick Mohr discharge to home/self care              Follow-up Information    None         Discharge Medication List as of 3/27/2019  3:30 PM      START taking these medications    Details   predniSONE 10 mg tablet 50 mg PO x 2 days then decrease by 10 mg every 2 days until finished, Normal         CONTINUE these medications which have NOT CHANGED    Details   acetaminophen (TYLENOL) 325 mg tablet Take 650 mg by mouth every 6 (six) hours as needed for mild pain, Historical Med      apixaban (ELIQUIS) 2 5 mg Take 2 5 mg by mouth 2 (two) times a day, Historical Med      atorvastatin (LIPITOR) 20 mg tablet Take 20 mg by mouth daily, Historical Med      co-enzyme Q-10 30 MG capsule Take 100 mg by mouth 3 (three) times a day, Historical Med      COMBIVENT RESPIMAT inhaler inhale 1 puff by mouth four times a day, Historical Med      digoxin (LANOXIN) 0 125 mg tablet Take 125 mcg by mouth daily, Historical Med      donepezil (ARICEPT) 10 mg tablet Take 10 mg by mouth daily at bedtime, Historical Med      furosemide (LASIX) 40 mg tablet Take 40 mg by mouth daily, Starting Fri 4/6/2018, Historical Med      ipratropium (ATROVENT) 0 06 % nasal spray Starting Sat 4/21/2018, Historical Med      ipratropium-albuterol (COMBIVENT)  mcg/act inhaler Inhale 1 puff 2 (two) times a day, Historical Med      isosorbide dinitrate (ISORDIL) 30 mg tablet Take 30 mg by mouth daily, Historical Med      losartan (COZAAR) 25 mg tablet Take 25 mg by mouth daily, Historical Med      memantine (NAMENDA) 10 mg tablet Take 10 mg by mouth daily, Starting Wed 4/4/2018, Historical Med      !! metoprolol tartrate (LOPRESSOR) 25 mg tablet Take 25 mg by mouth every morning, Historical Med      !! metoprolol tartrate (LOPRESSOR) 50 mg tablet Take 50 mg by mouth every evening, Historical Med      mirtazapine (REMERON) 7 5 MG tablet Take 7 5 mg by mouth daily at bedtime, Historical Med      Multiple Vitamins-Minerals (PRESERVISION AREDS 2+MULTI VIT PO) Take 1 capsule by mouth 2 (two) times a day, Historical Med      pantoprazole (PROTONIX) 40 mg tablet Take 40 mg by mouth daily, Historical Med      rOPINIRole (REQUIP) 0 5 mg tablet take 1 tablet by mouth at bedtime for 2 weeks then INCREASE TO 2 TABLETS FOR RESTLESS LEGS, Normal      Specialty Vitamins Products (MAGNESIUM, AMINO ACID CHELATE,) 133 MG tablet Take 1 tablet by mouth daily, Historical Med      traMADol (ULTRAM) 50 mg tablet Take 1 tablet (50 mg total) by mouth every 6 (six) hours as needed for severe pain, Starting Sat 4/28/2018, Print       !! - Potential duplicate medications found  Please discuss with provider  No discharge procedures on file      ED Provider  Electronically Signed by           Ana Paula Villarreal,   03/27/19 1559       Ana Paula Villarreal DO  03/27/19 2832

## 2019-05-21 ENCOUNTER — OFFICE VISIT (OUTPATIENT)
Dept: CARDIOLOGY CLINIC | Facility: CLINIC | Age: 84
End: 2019-05-21
Payer: MEDICARE

## 2019-05-21 VITALS
HEART RATE: 81 BPM | HEIGHT: 61 IN | DIASTOLIC BLOOD PRESSURE: 64 MMHG | BODY MASS INDEX: 21.11 KG/M2 | WEIGHT: 111.8 LBS | SYSTOLIC BLOOD PRESSURE: 126 MMHG

## 2019-05-21 DIAGNOSIS — I48.91 ATRIAL FIBRILLATION, UNSPECIFIED TYPE (HCC): ICD-10-CM

## 2019-05-21 DIAGNOSIS — I10 ESSENTIAL (PRIMARY) HYPERTENSION: Primary | ICD-10-CM

## 2019-05-21 DIAGNOSIS — E78.00 PURE HYPERCHOLESTEROLEMIA: ICD-10-CM

## 2019-05-21 DIAGNOSIS — I34.0 NON-RHEUMATIC MITRAL REGURGITATION: ICD-10-CM

## 2019-05-21 PROCEDURE — 99214 OFFICE O/P EST MOD 30 MIN: CPT | Performed by: INTERNAL MEDICINE

## 2019-05-21 RX ORDER — LANOLIN ALCOHOL/MO/W.PET/CERES
400 CREAM (GRAM) TOPICAL DAILY
Refills: 0 | COMMUNITY
Start: 2019-04-26 | End: 2020-01-01 | Stop reason: SDUPTHER

## 2019-05-27 ENCOUNTER — APPOINTMENT (EMERGENCY)
Dept: RADIOLOGY | Facility: HOSPITAL | Age: 84
End: 2019-05-27
Payer: MEDICARE

## 2019-05-27 ENCOUNTER — HOSPITAL ENCOUNTER (EMERGENCY)
Facility: HOSPITAL | Age: 84
Discharge: HOME | End: 2019-05-27
Attending: EMERGENCY MEDICINE | Admitting: EMERGENCY MEDICINE
Payer: MEDICARE

## 2019-05-27 VITALS
OXYGEN SATURATION: 95 % | TEMPERATURE: 99.3 F | SYSTOLIC BLOOD PRESSURE: 177 MMHG | RESPIRATION RATE: 18 BRPM | HEART RATE: 76 BPM | WEIGHT: 114.2 LBS | BODY MASS INDEX: 21.58 KG/M2 | DIASTOLIC BLOOD PRESSURE: 70 MMHG

## 2019-05-27 DIAGNOSIS — J06.9 UPPER RESPIRATORY TRACT INFECTION, UNSPECIFIED TYPE: Primary | ICD-10-CM

## 2019-05-27 LAB
ALBUMIN SERPL BCP-MCNC: 3.3 G/DL (ref 3.5–5)
ALP SERPL-CCNC: 105 U/L (ref 46–116)
ALT SERPL W P-5'-P-CCNC: 32 U/L (ref 12–78)
ANION GAP SERPL CALCULATED.3IONS-SCNC: 13 MMOL/L (ref 4–13)
APTT PPP: 34 SECONDS (ref 26–38)
AST SERPL W P-5'-P-CCNC: 36 U/L (ref 5–45)
BASOPHILS # BLD AUTO: 0.06 THOUSANDS/ΜL (ref 0–0.1)
BASOPHILS NFR BLD AUTO: 1 % (ref 0–1)
BILIRUB SERPL-MCNC: 0.7 MG/DL (ref 0.2–1)
BUN SERPL-MCNC: 27 MG/DL (ref 5–25)
CALCIUM SERPL-MCNC: 8.7 MG/DL (ref 8.3–10.1)
CHLORIDE SERPL-SCNC: 101 MMOL/L (ref 100–108)
CO2 SERPL-SCNC: 26 MMOL/L (ref 21–32)
CREAT SERPL-MCNC: 1.34 MG/DL (ref 0.6–1.3)
EOSINOPHIL # BLD AUTO: 0.12 THOUSAND/ΜL (ref 0–0.61)
EOSINOPHIL NFR BLD AUTO: 2 % (ref 0–6)
ERYTHROCYTE [DISTWIDTH] IN BLOOD BY AUTOMATED COUNT: 14.1 % (ref 11.6–15.1)
GFR SERPL CREATININE-BSD FRML MDRD: 35 ML/MIN/1.73SQ M
GLUCOSE SERPL-MCNC: 93 MG/DL (ref 65–140)
HCT VFR BLD AUTO: 42 % (ref 34.8–46.1)
HGB BLD-MCNC: 13.8 G/DL (ref 11.5–15.4)
IMM GRANULOCYTES # BLD AUTO: 0.03 THOUSAND/UL (ref 0–0.2)
IMM GRANULOCYTES NFR BLD AUTO: 0 % (ref 0–2)
INR PPP: 1.38 (ref 0.86–1.17)
LYMPHOCYTES # BLD AUTO: 1.23 THOUSANDS/ΜL (ref 0.6–4.47)
LYMPHOCYTES NFR BLD AUTO: 18 % (ref 14–44)
MCH RBC QN AUTO: 32.5 PG (ref 26.8–34.3)
MCHC RBC AUTO-ENTMCNC: 32.9 G/DL (ref 31.4–37.4)
MCV RBC AUTO: 99 FL (ref 82–98)
MONOCYTES # BLD AUTO: 1.22 THOUSAND/ΜL (ref 0.17–1.22)
MONOCYTES NFR BLD AUTO: 18 % (ref 4–12)
NEUTROPHILS # BLD AUTO: 4.13 THOUSANDS/ΜL (ref 1.85–7.62)
NEUTS SEG NFR BLD AUTO: 61 % (ref 43–75)
NRBC BLD AUTO-RTO: 0 /100 WBCS
NT-PROBNP SERPL-MCNC: 3023 PG/ML
PLATELET # BLD AUTO: 242 THOUSANDS/UL (ref 149–390)
PMV BLD AUTO: 10.4 FL (ref 8.9–12.7)
POTASSIUM SERPL-SCNC: 3.7 MMOL/L (ref 3.5–5.3)
PROT SERPL-MCNC: 6.7 G/DL (ref 6.4–8.2)
PROTHROMBIN TIME: 16.6 SECONDS (ref 11.8–14.2)
RBC # BLD AUTO: 4.25 MILLION/UL (ref 3.81–5.12)
SODIUM SERPL-SCNC: 140 MMOL/L (ref 136–145)
TROPONIN I SERPL-MCNC: <0.02 NG/ML
WBC # BLD AUTO: 6.79 THOUSAND/UL (ref 4.31–10.16)

## 2019-05-27 PROCEDURE — 99284 EMERGENCY DEPT VISIT MOD MDM: CPT

## 2019-05-27 PROCEDURE — 99284 EMERGENCY DEPT VISIT MOD MDM: CPT | Performed by: PHYSICIAN ASSISTANT

## 2019-05-27 PROCEDURE — 85730 THROMBOPLASTIN TIME PARTIAL: CPT | Performed by: PHYSICIAN ASSISTANT

## 2019-05-27 PROCEDURE — 85025 COMPLETE CBC W/AUTO DIFF WBC: CPT | Performed by: PHYSICIAN ASSISTANT

## 2019-05-27 PROCEDURE — 71046 X-RAY EXAM CHEST 2 VIEWS: CPT

## 2019-05-27 PROCEDURE — 85610 PROTHROMBIN TIME: CPT | Performed by: PHYSICIAN ASSISTANT

## 2019-05-27 PROCEDURE — 80053 COMPREHEN METABOLIC PANEL: CPT | Performed by: PHYSICIAN ASSISTANT

## 2019-05-27 PROCEDURE — 84484 ASSAY OF TROPONIN QUANT: CPT | Performed by: PHYSICIAN ASSISTANT

## 2019-05-27 PROCEDURE — 36415 COLL VENOUS BLD VENIPUNCTURE: CPT | Performed by: PHYSICIAN ASSISTANT

## 2019-05-27 PROCEDURE — 83880 ASSAY OF NATRIURETIC PEPTIDE: CPT | Performed by: PHYSICIAN ASSISTANT

## 2019-05-27 RX ORDER — AZITHROMYCIN 250 MG/1
500 TABLET, FILM COATED ORAL ONCE
Status: COMPLETED | OUTPATIENT
Start: 2019-05-27 | End: 2019-05-27

## 2019-05-27 RX ORDER — FUROSEMIDE 40 MG/1
40 TABLET ORAL ONCE
Status: COMPLETED | OUTPATIENT
Start: 2019-05-27 | End: 2019-05-27

## 2019-05-27 RX ORDER — AZITHROMYCIN 250 MG/1
250 TABLET, FILM COATED ORAL DAILY
Qty: 4 TABLET | Refills: 0 | Status: SHIPPED | OUTPATIENT
Start: 2019-05-27 | End: 2019-05-31

## 2019-05-27 RX ADMIN — FUROSEMIDE 40 MG: 40 TABLET ORAL at 15:10

## 2019-05-27 RX ADMIN — AZITHROMYCIN 500 MG: 250 TABLET, FILM COATED ORAL at 15:10

## 2019-05-30 LAB
ATRIAL RATE: 68 BPM
QRS AXIS: 98 DEGREES
QRSD INTERVAL: 90 MS
QT INTERVAL: 386 MS
QTC INTERVAL: 402 MS
T WAVE AXIS: -34 DEGREES
VENTRICULAR RATE: 65 BPM

## 2019-05-30 PROCEDURE — 93010 ELECTROCARDIOGRAM REPORT: CPT | Performed by: INTERNAL MEDICINE

## 2019-06-05 ENCOUNTER — TRANSCRIBE ORDERS (OUTPATIENT)
Dept: ADMINISTRATIVE | Facility: HOSPITAL | Age: 84
End: 2019-06-05

## 2019-06-05 DIAGNOSIS — K81.9 CHOLECYSTITIS, UNSPECIFIED: Primary | ICD-10-CM

## 2019-06-07 ENCOUNTER — APPOINTMENT (OUTPATIENT)
Dept: LAB | Facility: CLINIC | Age: 84
End: 2019-06-07
Payer: MEDICARE

## 2019-06-07 ENCOUNTER — HOSPITAL ENCOUNTER (OUTPATIENT)
Dept: ULTRASOUND IMAGING | Facility: HOSPITAL | Age: 84
Discharge: HOME | End: 2019-06-07
Payer: MEDICARE

## 2019-06-07 ENCOUNTER — TRANSCRIBE ORDERS (OUTPATIENT)
Dept: LAB | Facility: CLINIC | Age: 84
End: 2019-06-07

## 2019-06-07 DIAGNOSIS — E78.5 HYPERLIPIDEMIA, UNSPECIFIED HYPERLIPIDEMIA TYPE: ICD-10-CM

## 2019-06-07 DIAGNOSIS — I42.9 FAMILIAL CARDIOMYOPATHY (HCC): ICD-10-CM

## 2019-06-07 DIAGNOSIS — I48.20 CHRONIC ATRIAL FIBRILLATION (HCC): ICD-10-CM

## 2019-06-07 DIAGNOSIS — I51.9 MYXEDEMA HEART DISEASE: ICD-10-CM

## 2019-06-07 DIAGNOSIS — I50.9 HEART FAILURE, UNSPECIFIED HF CHRONICITY, UNSPECIFIED HEART FAILURE TYPE (HCC): ICD-10-CM

## 2019-06-07 DIAGNOSIS — E03.9 MYXEDEMA HEART DISEASE: ICD-10-CM

## 2019-06-07 DIAGNOSIS — G30.9 ALZHEIMER'S DEMENTIA WITHOUT BEHAVIORAL DISTURBANCE, UNSPECIFIED TIMING OF DEMENTIA ONSET (HCC): ICD-10-CM

## 2019-06-07 DIAGNOSIS — I42.9 FAMILIAL CARDIOMYOPATHY (HCC): Primary | ICD-10-CM

## 2019-06-07 DIAGNOSIS — F02.80 ALZHEIMER'S DEMENTIA WITHOUT BEHAVIORAL DISTURBANCE, UNSPECIFIED TIMING OF DEMENTIA ONSET (HCC): ICD-10-CM

## 2019-06-07 DIAGNOSIS — K81.9 CHOLECYSTITIS, UNSPECIFIED: ICD-10-CM

## 2019-06-07 LAB
ALBUMIN SERPL BCP-MCNC: 3.7 G/DL (ref 3.5–5)
ALP SERPL-CCNC: 98 U/L (ref 46–116)
ALT SERPL W P-5'-P-CCNC: 31 U/L (ref 12–78)
ANION GAP SERPL CALCULATED.3IONS-SCNC: 7 MMOL/L (ref 4–13)
AST SERPL W P-5'-P-CCNC: 34 U/L (ref 5–45)
BILIRUB SERPL-MCNC: 0.9 MG/DL (ref 0.2–1)
BUN SERPL-MCNC: 26 MG/DL (ref 5–25)
CALCIUM SERPL-MCNC: 9.2 MG/DL (ref 8.3–10.1)
CHLORIDE SERPL-SCNC: 105 MMOL/L (ref 100–108)
CHOLEST SERPL-MCNC: 135 MG/DL (ref 50–200)
CO2 SERPL-SCNC: 32 MMOL/L (ref 21–32)
CREAT SERPL-MCNC: 1.2 MG/DL (ref 0.6–1.3)
ERYTHROCYTE [DISTWIDTH] IN BLOOD BY AUTOMATED COUNT: 14 % (ref 11.6–15.1)
GFR SERPL CREATININE-BSD FRML MDRD: 40 ML/MIN/1.73SQ M
GLUCOSE P FAST SERPL-MCNC: 84 MG/DL (ref 65–99)
HCT VFR BLD AUTO: 47.6 % (ref 34.8–46.1)
HDLC SERPL-MCNC: 55 MG/DL (ref 40–60)
HGB BLD-MCNC: 14.9 G/DL (ref 11.5–15.4)
LDLC SERPL CALC-MCNC: 62 MG/DL (ref 0–100)
MCH RBC QN AUTO: 31.9 PG (ref 26.8–34.3)
MCHC RBC AUTO-ENTMCNC: 31.3 G/DL (ref 31.4–37.4)
MCV RBC AUTO: 102 FL (ref 82–98)
NONHDLC SERPL-MCNC: 80 MG/DL
NT-PROBNP SERPL-MCNC: 2444 PG/ML
PLATELET # BLD AUTO: 366 THOUSANDS/UL (ref 149–390)
PMV BLD AUTO: 10.3 FL (ref 8.9–12.7)
POTASSIUM SERPL-SCNC: 4.3 MMOL/L (ref 3.5–5.3)
PROT SERPL-MCNC: 7.4 G/DL (ref 6.4–8.2)
RBC # BLD AUTO: 4.67 MILLION/UL (ref 3.81–5.12)
SODIUM SERPL-SCNC: 144 MMOL/L (ref 136–145)
TRIGL SERPL-MCNC: 88 MG/DL
TSH SERPL DL<=0.05 MIU/L-ACNC: 3.72 UIU/ML (ref 0.36–3.74)
WBC # BLD AUTO: 7.9 THOUSAND/UL (ref 4.31–10.16)

## 2019-06-07 PROCEDURE — 76705 ECHO EXAM OF ABDOMEN: CPT

## 2019-06-07 PROCEDURE — 80061 LIPID PANEL: CPT

## 2019-06-07 PROCEDURE — 80053 COMPREHEN METABOLIC PANEL: CPT

## 2019-06-07 PROCEDURE — 83880 ASSAY OF NATRIURETIC PEPTIDE: CPT

## 2019-06-07 PROCEDURE — 84443 ASSAY THYROID STIM HORMONE: CPT

## 2019-06-07 PROCEDURE — 85027 COMPLETE CBC AUTOMATED: CPT

## 2019-06-07 PROCEDURE — 36415 COLL VENOUS BLD VENIPUNCTURE: CPT

## 2019-07-31 ENCOUNTER — HOSPITAL ENCOUNTER (OUTPATIENT)
Dept: RADIOLOGY | Age: 84
Discharge: HOME | End: 2019-07-31
Payer: MEDICARE

## 2019-07-31 DIAGNOSIS — H91.21 SUDDEN HEARING LOSS, RIGHT: ICD-10-CM

## 2019-07-31 DIAGNOSIS — H90.3 SENSORINEURAL HEARING LOSS (SNHL) OF BOTH EARS: ICD-10-CM

## 2019-07-31 DIAGNOSIS — IMO0001 ASYMMETRICAL HEARING LOSS OF RIGHT EAR: ICD-10-CM

## 2019-07-31 PROCEDURE — 70553 MRI BRAIN STEM W/O & W/DYE: CPT

## 2019-07-31 PROCEDURE — A9585 GADOBUTROL INJECTION: HCPCS | Performed by: PHYSICIAN ASSISTANT

## 2019-07-31 RX ADMIN — GADOBUTROL 5 ML: 604.72 INJECTION INTRAVENOUS at 12:16

## 2019-11-29 NOTE — PROGRESS NOTES
Cardiology Follow Up    [place]  1/15/1928  [id number]  [address]  [address] [facility]  [phone]    1  Essential (primary) hypertension  POCT ECG   2  Atrial fibrillation, unspecified type ([place] [address] )  POCT ECG   3  Pure hypercholesterolemia     4  Non-rheumatic mitral regurgitation         Interval History:  Patient is here for a follow-up visit  Her most recent echocardiogram was done March 2017 and demonstrated preserved LV systolic function with mild LVH  She was noted to have mild mitral regurgitation with mild aortic valve stenosis and moderate aortic regurgitation  Severe tricuspid regurgitation was noted  Patient has chronic atrial fibrillation on adjusted dose Eliquis  Patient has been doing well  She has had no chest pain or significant dyspnea  As usual she is here today with her   Patient had a lipid profile done June 2019 which demonstrated a total cholesterol of 135 with an HDL of 55 and a direct LDL of 62  Patient's blood pressure initially was high but I rechecked it and it was 140/85  She will make sure that she took her medications when she goes home  She has a nurse who comes into organized her pills on a monthly basis  EKG today demonstrates atrial fibrillation with a controlled ventricular response    Patient Active Problem List   Diagnosis    COPD with acute exacerbation ([place] [address] )    Essential hypertension    Chronic atrial fibrillation    Hyperlipidemia    Influenza A H1N1 infection     Past Medical History:   Diagnosis Date    Arrhythmia     Asthma     Atrial fibrillation ([place] [address] )     Cardiac disease     COPD (chronic obstructive pulmonary disease) ([place] [address] )     Dementia ([place] [address] )     Depression     GERD (gastroesophageal reflux disease)     History of heart artery stent     Hyperlipidemia     Hypertension      Social History     Socioeconomic History    Marital status: /Civil Union     Spouse name: Not on file    Number of children: Not on file    Years of education: Not on file    Highest education level: Not on file   Occupational History    Not on file   Social Needs    Financial resource strain: Not on file    Food insecurity:     Worry: Not on file     Inability: Not on file    Transportation needs:     Medical: Not on file     Non-medical: Not on file   Tobacco Use    Smoking status: Never Smoker    Smokeless tobacco: Never Used   Substance and Sexual Activity    Alcohol use: No    Drug use: No    Sexual activity: Not on file   Lifestyle    Physical activity:     Days per week: Not on file     Minutes per session: Not on file    Stress: Not on file   Relationships    Social connections:     Talks on phone: Not on file     Gets together: Not on file     Attends Worship service: Not on file     Active member of club or organization: Not on file     Attends meetings of clubs or organizations: Not on file     Relationship status: Not on file    Intimate partner violence:     Fear of current or ex partner: Not on file     Emotionally abused: Not on file     Physically abused: Not on file     Forced sexual activity: Not on file   Other Topics Concern    Not on file   Social History Narrative    Not on file      Family History   Problem Relation Age of Onset    Cancer Sister     Cancer Brother      History reviewed  No pertinent surgical history      Current Outpatient Medications:     apixaban (ELIQUIS) 2 5 mg, Take 2 5 mg by mouth 2 (two) times a day, Disp: , Rfl:     atorvastatin (LIPITOR) 20 mg tablet, Take 20 mg by mouth daily, Disp: , Rfl:     Coenzyme Q10 100 MG TABS, Take 100 mg by mouth daily , Disp: , Rfl:     digoxin (LANOXIN) 0 125 mg tablet, Take 125 mcg by mouth daily, Disp: , Rfl:     donepezil (ARICEPT) 10 mg tablet, Take 10 mg by mouth daily at bedtime, Disp: , Rfl:     fluticasone (FLONASE) 50 mcg/act nasal spray, 1 spray into each nostril daily, Disp: , Rfl:     furosemide (LASIX) 40 mg tablet, Take 40 mg by mouth daily, Disp: , Rfl: 0    ipratropium (ATROVENT) 0 06 % nasal spray, , Disp: , Rfl: 1    ipratropium-albuterol (COMBIVENT)  mcg/act inhaler, Inhale 1 puff 2 (two) times a day, Disp: , Rfl:     isosorbide dinitrate (ISORDIL) 30 mg tablet, Take 30 mg by mouth daily, Disp: , Rfl:     isosorbide mononitrate (IMDUR) 30 mg 24 hr tablet, Take 30 mg by mouth daily, Disp: , Rfl:     losartan (COZAAR) 25 mg tablet, Take 25 mg by mouth daily, Disp: , Rfl:     magnesium Oxide (MAG-OX) 400 mg TABS, 400 mg daily , Disp: , Rfl: 0    memantine (NAMENDA) 10 mg tablet, Take 10 mg by mouth daily, Disp: , Rfl: 0    metoprolol tartrate (LOPRESSOR) 25 mg tablet, Take 25 mg by mouth every morning, Disp: , Rfl:     metoprolol tartrate (LOPRESSOR) 50 mg tablet, Take 50 mg by mouth every evening, Disp: , Rfl:     mirtazapine (REMERON) 7 5 MG tablet, Take 7 5 mg by mouth daily at bedtime, Disp: , Rfl:     Multiple Vitamins-Minerals (PRESERVISION AREDS 2+MULTI VIT PO), Take 1 capsule by mouth 2 (two) times a day, Disp: , Rfl:     pantoprazole (PROTONIX) 40 mg tablet, Take 40 mg by mouth daily, Disp: , Rfl:     predniSONE 10 mg tablet, 50 mg PO x 2 days then decrease by 10 mg every 2 days until finished, Disp: 30 tablet, Rfl: 0    rOPINIRole (REQUIP) 0 5 mg tablet, take 1 tablet by mouth at bedtime for 2 weeks then INCREASE TO 2 TABLETS FOR RESTLESS LEGS, Disp: 60 tablet, Rfl: 2    Specialty Vitamins Products (MAGNESIUM, AMINO ACID CHELATE,) 133 MG tablet, Take 1 tablet by mouth daily, Disp: , Rfl:     traMADol (ULTRAM) 50 mg tablet, Take 1 tablet (50 mg total) by mouth every 6 (six) hours as needed for severe pain, Disp: 15 tablet, Rfl: 0    acetaminophen (TYLENOL) 325 mg tablet, Take 650 mg by mouth every 6 (six) hours as needed for mild pain, Disp: , Rfl:   Allergies   Allergen Reactions    Asa [Aspirin]     Latex  Shellfish-Derived Products      IV dye    Sulfa Antibiotics     Thiazide-Type Diuretics        Labs:not applicable  Imaging: No results found  Review of Systems:  Review of Systems   All other systems reviewed and are negative  Physical Exam:  BP (!) 166/102 (BP Location: Right arm, Patient Position: Sitting, Cuff Size: Adult)   Pulse 70   Ht 5' 1" (1 549 m)   Wt 51 4 kg (113 lb 6 4 oz)   BMI 21 43 kg/m²   Physical Exam   Constitutional: She is oriented to person, place, and time  She appears well-developed and well-nourished  HENT:   Head: Normocephalic and atraumatic  Eyes: Pupils are equal, round, and reactive to light  Conjunctivae and EOM are normal    Neck: Normal range of motion  Neck supple  Cardiovascular: Normal rate  Murmur heard  Pulmonary/Chest: Effort normal and breath sounds normal    Neurological: She is alert and oriented to person, place, and time  Skin: Skin is warm and dry  Psychiatric: She has a normal mood and affect  Vitals reviewed  Discussion/Summary:I will continue the patient's present medical regimen  The patient appears well compensated  I have asked the patient to call if there is a problem in the interim otherwise I will see the patient in six months time

## 2019-12-02 ENCOUNTER — TELEPHONE (OUTPATIENT)
Dept: CARDIOLOGY CLINIC | Facility: CLINIC | Age: 84
End: 2019-12-02

## 2019-12-02 NOTE — TELEPHONE ENCOUNTER
Called and spoke with staff from Dr Gonzalez [facility] office to see if patient had recent labs done  Staff denied recent labs

## 2019-12-03 ENCOUNTER — OFFICE VISIT (OUTPATIENT)
Dept: CARDIOLOGY CLINIC | Facility: CLINIC | Age: 84
End: 2019-12-03
Payer: MEDICARE

## 2019-12-03 VITALS
HEART RATE: 70 BPM | BODY MASS INDEX: 21.41 KG/M2 | SYSTOLIC BLOOD PRESSURE: 166 MMHG | HEIGHT: 61 IN | DIASTOLIC BLOOD PRESSURE: 102 MMHG | WEIGHT: 113.4 LBS

## 2019-12-03 DIAGNOSIS — I10 ESSENTIAL (PRIMARY) HYPERTENSION: Primary | ICD-10-CM

## 2019-12-03 DIAGNOSIS — I48.91 ATRIAL FIBRILLATION, UNSPECIFIED TYPE (HCC): ICD-10-CM

## 2019-12-03 DIAGNOSIS — I34.0 NON-RHEUMATIC MITRAL REGURGITATION: ICD-10-CM

## 2019-12-03 DIAGNOSIS — E78.00 PURE HYPERCHOLESTEROLEMIA: ICD-10-CM

## 2019-12-03 PROCEDURE — 99214 OFFICE O/P EST MOD 30 MIN: CPT | Performed by: INTERNAL MEDICINE

## 2019-12-03 PROCEDURE — 93000 ELECTROCARDIOGRAM COMPLETE: CPT | Performed by: INTERNAL MEDICINE

## 2020-01-01 ENCOUNTER — HOSPITAL ENCOUNTER (OUTPATIENT)
Dept: RADIOLOGY | Facility: HOSPITAL | Age: 85
Discharge: HOME | End: 2020-10-07
Payer: MEDICARE

## 2020-01-01 ENCOUNTER — TRANSCRIBE ORDERS (OUTPATIENT)
Dept: ADMINISTRATIVE | Facility: HOSPITAL | Age: 85
End: 2020-01-01

## 2020-01-01 ENCOUNTER — ANESTHESIA EVENT (INPATIENT)
Dept: GASTROENTEROLOGY | Facility: HOSPITAL | Age: 85
DRG: 628 | End: 2020-01-01
Payer: MEDICARE

## 2020-01-01 ENCOUNTER — APPOINTMENT (OUTPATIENT)
Dept: CT IMAGING | Facility: HOSPITAL | Age: 85
DRG: 884 | End: 2020-01-01
Payer: MEDICARE

## 2020-01-01 ENCOUNTER — APPOINTMENT (EMERGENCY)
Dept: RADIOLOGY | Facility: HOSPITAL | Age: 85
DRG: 628 | End: 2020-01-01
Payer: MEDICARE

## 2020-01-01 ENCOUNTER — HOSPITAL ENCOUNTER (OUTPATIENT)
Dept: RADIOLOGY | Age: 85
Discharge: HOME | End: 2020-07-23
Payer: MEDICARE

## 2020-01-01 ENCOUNTER — LAB REQUISITION (OUTPATIENT)
Dept: LAB | Facility: HOSPITAL | Age: 85
End: 2020-01-01
Payer: MEDICARE

## 2020-01-01 ENCOUNTER — TELEPHONE (OUTPATIENT)
Dept: GASTROENTEROLOGY | Facility: CLINIC | Age: 85
End: 2020-01-01

## 2020-01-01 ENCOUNTER — APPOINTMENT (INPATIENT)
Dept: NON INVASIVE DIAGNOSTICS | Facility: HOSPITAL | Age: 85
DRG: 628 | End: 2020-01-01
Payer: MEDICARE

## 2020-01-01 ENCOUNTER — HOSPITAL ENCOUNTER (OUTPATIENT)
Dept: ULTRASOUND IMAGING | Facility: HOSPITAL | Age: 85
Discharge: HOME | End: 2020-01-30
Payer: MEDICARE

## 2020-01-01 ENCOUNTER — HOSPITAL ENCOUNTER (EMERGENCY)
Facility: HOSPITAL | Age: 85
Discharge: HOME | End: 2020-04-14
Attending: EMERGENCY MEDICINE | Admitting: EMERGENCY MEDICINE
Payer: MEDICARE

## 2020-01-01 ENCOUNTER — APPOINTMENT (INPATIENT)
Dept: GASTROENTEROLOGY | Facility: HOSPITAL | Age: 85
DRG: 628 | End: 2020-01-01
Payer: MEDICARE

## 2020-01-01 ENCOUNTER — APPOINTMENT (EMERGENCY)
Dept: RADIOLOGY | Facility: HOSPITAL | Age: 85
End: 2020-01-01
Payer: MEDICARE

## 2020-01-01 ENCOUNTER — TELEPHONE (OUTPATIENT)
Dept: GASTROENTEROLOGY | Facility: AMBULARY SURGERY CENTER | Age: 85
End: 2020-01-01

## 2020-01-01 ENCOUNTER — APPOINTMENT (INPATIENT)
Dept: RADIOLOGY | Facility: HOSPITAL | Age: 85
DRG: 628 | End: 2020-01-01
Payer: MEDICARE

## 2020-01-01 ENCOUNTER — ANESTHESIA (INPATIENT)
Dept: GASTROENTEROLOGY | Facility: HOSPITAL | Age: 85
DRG: 628 | End: 2020-01-01
Payer: MEDICARE

## 2020-01-01 ENCOUNTER — TELEMEDICINE (OUTPATIENT)
Dept: CARDIOLOGY CLINIC | Facility: CLINIC | Age: 85
End: 2020-01-01
Payer: MEDICARE

## 2020-01-01 ENCOUNTER — ANESTHESIA (INPATIENT)
Dept: PERIOP | Facility: HOSPITAL | Age: 85
DRG: 628 | End: 2020-01-01
Payer: MEDICARE

## 2020-01-01 ENCOUNTER — HOSPITAL ENCOUNTER (OUTPATIENT)
Dept: GASTROENTEROLOGY | Facility: HOSPITAL | Age: 85
Setting detail: OUTPATIENT SURGERY
Discharge: HOME | End: 2020-12-16
Attending: INTERNAL MEDICINE
Payer: MEDICARE

## 2020-01-01 ENCOUNTER — HOSPITAL ENCOUNTER (OUTPATIENT)
Dept: ULTRASOUND IMAGING | Facility: HOSPITAL | Age: 85
Discharge: HOME | End: 2020-06-30
Payer: MEDICARE

## 2020-01-01 ENCOUNTER — OFFICE VISIT (OUTPATIENT)
Dept: OBGYN CLINIC | Facility: CLINIC | Age: 85
End: 2020-01-01
Payer: MEDICARE

## 2020-01-01 ENCOUNTER — HOSPITAL ENCOUNTER (INPATIENT)
Facility: HOSPITAL | Age: 85
LOS: 1 days | Discharge: HOME | DRG: 884 | End: 2020-12-10
Attending: EMERGENCY MEDICINE | Admitting: STUDENT IN AN ORGANIZED HEALTH CARE EDUCATION/TRAINING PROGRAM
Payer: MEDICARE

## 2020-01-01 ENCOUNTER — TELEPHONE (OUTPATIENT)
Dept: OBGYN CLINIC | Facility: HOSPITAL | Age: 85
End: 2020-01-01

## 2020-01-01 ENCOUNTER — ANESTHESIA EVENT (OUTPATIENT)
Dept: GASTROENTEROLOGY | Facility: HOSPITAL | Age: 85
End: 2020-01-01

## 2020-01-01 ENCOUNTER — HOSPITAL ENCOUNTER (EMERGENCY)
Facility: HOSPITAL | Age: 85
Discharge: HOME | End: 2020-01-09
Attending: EMERGENCY MEDICINE
Payer: MEDICARE

## 2020-01-01 ENCOUNTER — ANESTHESIA EVENT (INPATIENT)
Dept: PERIOP | Facility: HOSPITAL | Age: 85
DRG: 628 | End: 2020-01-01
Payer: MEDICARE

## 2020-01-01 ENCOUNTER — ANESTHESIA (OUTPATIENT)
Dept: GASTROENTEROLOGY | Facility: HOSPITAL | Age: 85
End: 2020-01-01

## 2020-01-01 ENCOUNTER — HOSPITAL ENCOUNTER (OUTPATIENT)
Dept: GASTROENTEROLOGY | Facility: HOSPITAL | Age: 85
Setting detail: OUTPATIENT SURGERY
Discharge: HOME | End: 2020-12-02
Attending: INTERNAL MEDICINE

## 2020-01-01 ENCOUNTER — APPOINTMENT (OUTPATIENT)
Dept: RADIOLOGY | Facility: AMBULARY SURGERY CENTER | Age: 85
End: 2020-01-01
Attending: ORTHOPAEDIC SURGERY
Payer: MEDICARE

## 2020-01-01 ENCOUNTER — OFFICE VISIT (OUTPATIENT)
Dept: OBGYN CLINIC | Facility: CLINIC | Age: 85
End: 2020-01-01

## 2020-01-01 ENCOUNTER — HOSPITAL ENCOUNTER (INPATIENT)
Facility: HOSPITAL | Age: 85
LOS: 5 days | DRG: 628 | End: 2020-12-26
Attending: EMERGENCY MEDICINE | Admitting: SURGERY
Payer: MEDICARE

## 2020-01-01 VITALS
RESPIRATION RATE: 19 BRPM | DIASTOLIC BLOOD PRESSURE: 65 MMHG | HEIGHT: 61 IN | BODY MASS INDEX: 17.52 KG/M2 | SYSTOLIC BLOOD PRESSURE: 160 MMHG | HEART RATE: 71 BPM | WEIGHT: 92.81 LBS | TEMPERATURE: 98.7 F | OXYGEN SATURATION: 92 %

## 2020-01-01 VITALS
BODY MASS INDEX: 18.65 KG/M2 | HEIGHT: 60 IN | OXYGEN SATURATION: 98 % | HEART RATE: 69 BPM | RESPIRATION RATE: 18 BRPM | WEIGHT: 95 LBS | TEMPERATURE: 97.9 F | DIASTOLIC BLOOD PRESSURE: 72 MMHG | SYSTOLIC BLOOD PRESSURE: 161 MMHG

## 2020-01-01 VITALS
DIASTOLIC BLOOD PRESSURE: 65 MMHG | HEART RATE: 72 BPM | HEIGHT: 61 IN | BODY MASS INDEX: 21.41 KG/M2 | SYSTOLIC BLOOD PRESSURE: 165 MMHG

## 2020-01-01 VITALS
SYSTOLIC BLOOD PRESSURE: 178 MMHG | HEART RATE: 87 BPM | OXYGEN SATURATION: 97 % | TEMPERATURE: 98.2 F | RESPIRATION RATE: 16 BRPM | DIASTOLIC BLOOD PRESSURE: 98 MMHG

## 2020-01-01 VITALS — WEIGHT: 104 LBS | BODY MASS INDEX: 19.63 KG/M2 | HEIGHT: 61 IN

## 2020-01-01 VITALS
BODY MASS INDEX: 21.41 KG/M2 | HEART RATE: 81 BPM | DIASTOLIC BLOOD PRESSURE: 78 MMHG | TEMPERATURE: 98 F | WEIGHT: 113.32 LBS | OXYGEN SATURATION: 99 % | SYSTOLIC BLOOD PRESSURE: 147 MMHG | RESPIRATION RATE: 16 BRPM

## 2020-01-01 VITALS
SYSTOLIC BLOOD PRESSURE: 143 MMHG | RESPIRATION RATE: 16 BRPM | HEART RATE: 90 BPM | DIASTOLIC BLOOD PRESSURE: 75 MMHG | BODY MASS INDEX: 18.55 KG/M2 | TEMPERATURE: 97.9 F | OXYGEN SATURATION: 95 % | HEIGHT: 60 IN

## 2020-01-01 VITALS — HEART RATE: 63 BPM

## 2020-01-01 VITALS
SYSTOLIC BLOOD PRESSURE: 130 MMHG | HEIGHT: 61 IN | HEART RATE: 77 BPM | BODY MASS INDEX: 21.41 KG/M2 | DIASTOLIC BLOOD PRESSURE: 70 MMHG

## 2020-01-01 VITALS — HEART RATE: 79 BPM

## 2020-01-01 VITALS — HEART RATE: 59 BPM

## 2020-01-01 DIAGNOSIS — S92.355A CLOSED NONDISPLACED FRACTURE OF FIFTH METATARSAL BONE OF LEFT FOOT, INITIAL ENCOUNTER: ICD-10-CM

## 2020-01-01 DIAGNOSIS — S92.345A CLOSED NONDISPLACED FRACTURE OF FOURTH METATARSAL BONE OF LEFT FOOT, INITIAL ENCOUNTER: Primary | ICD-10-CM

## 2020-01-01 DIAGNOSIS — E78.00 PURE HYPERCHOLESTEROLEMIA: ICD-10-CM

## 2020-01-01 DIAGNOSIS — K81.9 CHOLECYSTITIS, UNSPECIFIED: ICD-10-CM

## 2020-01-01 DIAGNOSIS — E87.0 HYPERNATREMIA: ICD-10-CM

## 2020-01-01 DIAGNOSIS — S36.509D INJURY OF COLON, SUBSEQUENT ENCOUNTER: ICD-10-CM

## 2020-01-01 DIAGNOSIS — R42 DIZZINESS: Primary | ICD-10-CM

## 2020-01-01 DIAGNOSIS — N39.0 UTI (URINARY TRACT INFECTION): ICD-10-CM

## 2020-01-01 DIAGNOSIS — R62.7 ADULT FAILURE TO THRIVE: Primary | ICD-10-CM

## 2020-01-01 DIAGNOSIS — I48.91 ATRIAL FIBRILLATION, UNSPECIFIED TYPE (HCC): ICD-10-CM

## 2020-01-01 DIAGNOSIS — I50.9 HEART FAILURE, UNSPECIFIED (HCC): ICD-10-CM

## 2020-01-01 DIAGNOSIS — K81.9 CHOLECYSTITIS, UNSPECIFIED: Primary | ICD-10-CM

## 2020-01-01 DIAGNOSIS — J44.9 COPD (CHRONIC OBSTRUCTIVE PULMONARY DISEASE) (HCC): ICD-10-CM

## 2020-01-01 DIAGNOSIS — R11.10 VOMITING, UNSPECIFIED: ICD-10-CM

## 2020-01-01 DIAGNOSIS — N17.9 AKI (ACUTE KIDNEY INJURY) (HCC): ICD-10-CM

## 2020-01-01 DIAGNOSIS — S92.345A CLOSED NONDISPLACED FRACTURE OF FOURTH METATARSAL BONE OF LEFT FOOT, INITIAL ENCOUNTER: ICD-10-CM

## 2020-01-01 DIAGNOSIS — R10.9 UNSPECIFIED ABDOMINAL PAIN: Primary | ICD-10-CM

## 2020-01-01 DIAGNOSIS — I21.4 NSTEMI (NON-ST ELEVATED MYOCARDIAL INFARCTION) (HCC): ICD-10-CM

## 2020-01-01 DIAGNOSIS — R13.0 APHAGIA: ICD-10-CM

## 2020-01-01 DIAGNOSIS — R10.9 UNSPECIFIED ABDOMINAL PAIN: ICD-10-CM

## 2020-01-01 DIAGNOSIS — R13.10 DYSPHAGIA, UNSPECIFIED TYPE: ICD-10-CM

## 2020-01-01 DIAGNOSIS — K94.23 PEG TUBE MALFUNCTION (HCC): ICD-10-CM

## 2020-01-01 DIAGNOSIS — S92.309A METATARSAL FRACTURE: Primary | ICD-10-CM

## 2020-01-01 DIAGNOSIS — N18.9 CHRONIC KIDNEY DISEASE: ICD-10-CM

## 2020-01-01 DIAGNOSIS — I21.4 NSTEMI, INITIAL EPISODE OF CARE (HCC): ICD-10-CM

## 2020-01-01 DIAGNOSIS — R05.9 COUGH: ICD-10-CM

## 2020-01-01 DIAGNOSIS — I34.0 NON-RHEUMATIC MITRAL REGURGITATION: ICD-10-CM

## 2020-01-01 DIAGNOSIS — T81.44XA: ICD-10-CM

## 2020-01-01 DIAGNOSIS — K56.609 SBO (SMALL BOWEL OBSTRUCTION) (HCC): ICD-10-CM

## 2020-01-01 DIAGNOSIS — F03.91 AGGRESSIVE BEHAVIOR DUE TO DEMENTIA (HCC): ICD-10-CM

## 2020-01-01 DIAGNOSIS — R10.9 ABDOMINAL PAIN: ICD-10-CM

## 2020-01-01 DIAGNOSIS — R10.11 ABDOMINAL PAIN, RIGHT UPPER QUADRANT: ICD-10-CM

## 2020-01-01 DIAGNOSIS — I10 ESSENTIAL (PRIMARY) HYPERTENSION: Primary | ICD-10-CM

## 2020-01-01 DIAGNOSIS — R10.11 ABDOMINAL PAIN, RIGHT UPPER QUADRANT: Primary | ICD-10-CM

## 2020-01-01 DIAGNOSIS — F03.91 DEMENTIA WITH BEHAVIORAL DISTURBANCE, UNSPECIFIED DEMENTIA TYPE (HCC): Primary | ICD-10-CM

## 2020-01-01 DIAGNOSIS — F03.91 DEMENTIA WITH BEHAVIORAL DISTURBANCE (HCC): ICD-10-CM

## 2020-01-01 LAB
ABO GROUP BLD BPU: NORMAL
ABO GROUP BLD: NORMAL
ABO GROUP BLD: NORMAL
ALBUMIN SERPL BCP-MCNC: 1.7 G/DL (ref 3.5–5)
ALBUMIN SERPL BCP-MCNC: 2 G/DL (ref 3.5–5)
ALBUMIN SERPL BCP-MCNC: 2.7 G/DL (ref 3.5–5)
ALBUMIN SERPL BCP-MCNC: 2.7 G/DL (ref 3.5–5)
ALBUMIN SERPL BCP-MCNC: 2.8 G/DL (ref 3.5–5)
ALBUMIN SERPL BCP-MCNC: 3.4 G/DL (ref 3.5–5)
ALBUMIN SERPL BCP-MCNC: 3.8 G/DL (ref 3.5–5)
ALP SERPL-CCNC: 103 U/L (ref 46–116)
ALP SERPL-CCNC: 106 U/L (ref 46–116)
ALP SERPL-CCNC: 106 U/L (ref 46–116)
ALP SERPL-CCNC: 109 U/L (ref 46–116)
ALP SERPL-CCNC: 129 U/L (ref 46–116)
ALP SERPL-CCNC: 142 U/L (ref 46–116)
ALP SERPL-CCNC: 70 U/L (ref 46–116)
ALT SERPL W P-5'-P-CCNC: 29 U/L (ref 12–78)
ALT SERPL W P-5'-P-CCNC: 309 U/L (ref 12–78)
ALT SERPL W P-5'-P-CCNC: 32 U/L (ref 12–78)
ALT SERPL W P-5'-P-CCNC: 35 U/L (ref 12–78)
ALT SERPL W P-5'-P-CCNC: 40 U/L (ref 12–78)
ALT SERPL W P-5'-P-CCNC: 40 U/L (ref 12–78)
ALT SERPL W P-5'-P-CCNC: 57 U/L (ref 12–78)
AMMONIA PLAS-SCNC: 19 UMOL/L (ref 11–35)
ANION GAP SERPL CALCULATED.3IONS-SCNC: 10 MMOL/L (ref 4–13)
ANION GAP SERPL CALCULATED.3IONS-SCNC: 12 MMOL/L (ref 4–13)
ANION GAP SERPL CALCULATED.3IONS-SCNC: 14 MMOL/L (ref 4–13)
ANION GAP SERPL CALCULATED.3IONS-SCNC: 15 MMOL/L (ref 4–13)
ANION GAP SERPL CALCULATED.3IONS-SCNC: 17 MMOL/L (ref 4–13)
ANION GAP SERPL CALCULATED.3IONS-SCNC: 18 MMOL/L (ref 4–13)
ANION GAP SERPL CALCULATED.3IONS-SCNC: 23 MMOL/L (ref 4–13)
ANION GAP SERPL CALCULATED.3IONS-SCNC: 8 MMOL/L (ref 4–13)
ANION GAP SERPL CALCULATED.3IONS-SCNC: 9 MMOL/L (ref 4–13)
ANION GAP SERPL CALCULATED.3IONS-SCNC: 9 MMOL/L (ref 4–13)
APAP SERPL-MCNC: <2 UG/ML (ref 10–20)
APTT PPP: 22 SECONDS (ref 23–37)
ARTERIAL PATENCY WRIST A: ABNORMAL
ARTERIAL PATENCY WRIST A: YES
AST SERPL W P-5'-P-CCNC: 136 U/L (ref 5–45)
AST SERPL W P-5'-P-CCNC: 34 U/L (ref 5–45)
AST SERPL W P-5'-P-CCNC: 40 U/L (ref 5–45)
AST SERPL W P-5'-P-CCNC: 44 U/L (ref 5–45)
AST SERPL W P-5'-P-CCNC: 51 U/L (ref 5–45)
AST SERPL W P-5'-P-CCNC: 63 U/L (ref 5–45)
AST SERPL W P-5'-P-CCNC: 945 U/L (ref 5–45)
ATRIAL RATE: 110 BPM
ATRIAL RATE: 128 BPM
ATRIAL RATE: 234 BPM
ATRIAL RATE: 70 BPM
ATRIAL RATE: 93 BPM
BACTERIA UR CULT: ABNORMAL
BACTERIA UR CULT: ABNORMAL
BACTERIA UR QL AUTO: ABNORMAL /HPF
BASE EX.OXY STD BLDV CALC-SCNC: 75.6 % (ref 60–80)
BASE EXCESS BLDA CALC-SCNC: -10 MMOL/L (ref -2–3)
BASE EXCESS BLDA CALC-SCNC: -10.2 MMOL/L
BASE EXCESS BLDA CALC-SCNC: -12 MMOL/L (ref -2–3)
BASE EXCESS BLDA CALC-SCNC: -16.5 MMOL/L
BASE EXCESS BLDA CALC-SCNC: -5.7 MMOL/L
BASE EXCESS BLDA CALC-SCNC: -9 MMOL/L (ref -2–3)
BASE EXCESS BLDV CALC-SCNC: -7.1 MMOL/L
BASOPHILS # BLD AUTO: 0.05 THOUSANDS/ΜL (ref 0–0.1)
BASOPHILS # BLD AUTO: 0.05 THOUSANDS/ΜL (ref 0–0.1)
BASOPHILS # BLD AUTO: 0.06 THOUSANDS/ΜL (ref 0–0.1)
BASOPHILS # BLD AUTO: 0.06 THOUSANDS/ΜL (ref 0–0.1)
BASOPHILS # BLD MANUAL: 0 THOUSAND/UL (ref 0–0.1)
BASOPHILS NFR BLD AUTO: 0 % (ref 0–1)
BASOPHILS NFR BLD AUTO: 1 % (ref 0–1)
BASOPHILS NFR MAR MANUAL: 0 % (ref 0–1)
BILIRUB DIRECT SERPL-MCNC: 0.19 MG/DL (ref 0–0.2)
BILIRUB SERPL-MCNC: 0.5 MG/DL (ref 0.2–1)
BILIRUB SERPL-MCNC: 0.7 MG/DL (ref 0.2–1)
BILIRUB SERPL-MCNC: 0.81 MG/DL (ref 0.2–1)
BILIRUB SERPL-MCNC: 0.92 MG/DL (ref 0.2–1)
BILIRUB SERPL-MCNC: 0.96 MG/DL (ref 0.2–1)
BILIRUB SERPL-MCNC: 0.97 MG/DL (ref 0.2–1)
BILIRUB SERPL-MCNC: 1.16 MG/DL (ref 0.2–1)
BILIRUB UR QL STRIP: ABNORMAL
BILIRUB UR QL STRIP: NEGATIVE
BILIRUB UR QL STRIP: NEGATIVE
BLD GP AB SCN SERPL QL: NEGATIVE
BODY TEMPERATURE: 98.6 DEGREES FEHRENHEIT
BPU ID: NORMAL
BUN SERPL-MCNC: 20 MG/DL (ref 5–25)
BUN SERPL-MCNC: 22 MG/DL (ref 5–25)
BUN SERPL-MCNC: 23 MG/DL (ref 5–25)
BUN SERPL-MCNC: 33 MG/DL (ref 5–25)
BUN SERPL-MCNC: 37 MG/DL (ref 5–25)
BUN SERPL-MCNC: 44 MG/DL (ref 5–25)
BUN SERPL-MCNC: 44 MG/DL (ref 5–25)
BUN SERPL-MCNC: 53 MG/DL (ref 5–25)
BUN SERPL-MCNC: 64 MG/DL (ref 5–25)
BUN SERPL-MCNC: 78 MG/DL (ref 5–25)
BUN SERPL-MCNC: 79 MG/DL (ref 5–25)
BUN SERPL-MCNC: 85 MG/DL (ref 5–25)
BUN SERPL-MCNC: 85 MG/DL (ref 5–25)
BUN SERPL-MCNC: 87 MG/DL (ref 5–25)
BUN SERPL-MCNC: 90 MG/DL (ref 5–25)
BURR CELLS BLD QL SMEAR: PRESENT
C DIFF TOX B TCDB STL QL NAA+PROBE: NEGATIVE
CA-I BLD-SCNC: 0.9 MMOL/L (ref 1.12–1.32)
CA-I BLD-SCNC: 1.01 MMOL/L (ref 1.12–1.32)
CA-I BLD-SCNC: 1.02 MMOL/L (ref 1.12–1.32)
CA-I BLD-SCNC: 1.08 MMOL/L (ref 1.12–1.32)
CALCIUM ALBUM COR SERPL-MCNC: 10 MG/DL (ref 8.3–10.1)
CALCIUM ALBUM COR SERPL-MCNC: 10.5 MG/DL (ref 8.3–10.1)
CALCIUM ALBUM COR SERPL-MCNC: 9.3 MG/DL (ref 8.3–10.1)
CALCIUM ALBUM COR SERPL-MCNC: 9.4 MG/DL (ref 8.3–10.1)
CALCIUM ALBUM COR SERPL-MCNC: 9.8 MG/DL (ref 8.3–10.1)
CALCIUM ALBUM COR SERPL-MCNC: 9.9 MG/DL (ref 8.3–10.1)
CALCIUM SERPL-MCNC: 10 MG/DL (ref 8.3–10.1)
CALCIUM SERPL-MCNC: 10 MG/DL (ref 8.3–10.1)
CALCIUM SERPL-MCNC: 6.1 MG/DL (ref 8.3–10.1)
CALCIUM SERPL-MCNC: 7.3 MG/DL (ref 8.3–10.1)
CALCIUM SERPL-MCNC: 7.7 MG/DL (ref 8.3–10.1)
CALCIUM SERPL-MCNC: 7.8 MG/DL (ref 8.3–10.1)
CALCIUM SERPL-MCNC: 8.4 MG/DL (ref 8.3–10.1)
CALCIUM SERPL-MCNC: 8.6 MG/DL (ref 8.3–10.1)
CALCIUM SERPL-MCNC: 8.7 MG/DL (ref 8.3–10.1)
CALCIUM SERPL-MCNC: 8.8 MG/DL (ref 8.3–10.1)
CALCIUM SERPL-MCNC: 8.9 MG/DL (ref 8.3–10.1)
CALCIUM SERPL-MCNC: 9.3 MG/DL (ref 8.3–10.1)
CALCIUM SERPL-MCNC: 9.4 MG/DL (ref 8.3–10.1)
CALCIUM SERPL-MCNC: 9.5 MG/DL (ref 8.3–10.1)
CALCIUM SERPL-MCNC: 9.5 MG/DL (ref 8.3–10.1)
CHLORIDE SERPL-SCNC: 100 MMOL/L (ref 100–108)
CHLORIDE SERPL-SCNC: 101 MMOL/L (ref 100–108)
CHLORIDE SERPL-SCNC: 105 MMOL/L (ref 100–108)
CHLORIDE SERPL-SCNC: 106 MMOL/L (ref 100–108)
CHLORIDE SERPL-SCNC: 106 MMOL/L (ref 100–108)
CHLORIDE SERPL-SCNC: 107 MMOL/L (ref 100–108)
CHLORIDE SERPL-SCNC: 107 MMOL/L (ref 100–108)
CHLORIDE SERPL-SCNC: 108 MMOL/L (ref 100–108)
CHLORIDE SERPL-SCNC: 108 MMOL/L (ref 100–108)
CHLORIDE SERPL-SCNC: 109 MMOL/L (ref 100–108)
CHLORIDE SERPL-SCNC: 111 MMOL/L (ref 100–108)
CHLORIDE SERPL-SCNC: 112 MMOL/L (ref 100–108)
CHLORIDE SERPL-SCNC: 115 MMOL/L (ref 100–108)
CHLORIDE SERPL-SCNC: 116 MMOL/L (ref 100–108)
CHLORIDE SERPL-SCNC: 116 MMOL/L (ref 100–108)
CLARITY UR: ABNORMAL
CLARITY UR: CLEAR
CLARITY UR: CLEAR
CO2 SERPL-SCNC: 11 MMOL/L (ref 21–32)
CO2 SERPL-SCNC: 16 MMOL/L (ref 21–32)
CO2 SERPL-SCNC: 21 MMOL/L (ref 21–32)
CO2 SERPL-SCNC: 21 MMOL/L (ref 21–32)
CO2 SERPL-SCNC: 25 MMOL/L (ref 21–32)
CO2 SERPL-SCNC: 26 MMOL/L (ref 21–32)
CO2 SERPL-SCNC: 28 MMOL/L (ref 21–32)
CO2 SERPL-SCNC: 28 MMOL/L (ref 21–32)
CO2 SERPL-SCNC: 30 MMOL/L (ref 21–32)
CO2 SERPL-SCNC: 31 MMOL/L (ref 21–32)
CO2 SERPL-SCNC: 31 MMOL/L (ref 21–32)
CO2 SERPL-SCNC: 32 MMOL/L (ref 21–32)
CO2 SERPL-SCNC: 33 MMOL/L (ref 21–32)
COLOR UR: ABNORMAL
COLOR UR: YELLOW
COLOR UR: YELLOW
COLOR, POC: NORMAL
CREAT SERPL-MCNC: 0.84 MG/DL (ref 0.6–1.3)
CREAT SERPL-MCNC: 0.91 MG/DL (ref 0.6–1.3)
CREAT SERPL-MCNC: 1.15 MG/DL (ref 0.6–1.3)
CREAT SERPL-MCNC: 1.15 MG/DL (ref 0.6–1.3)
CREAT SERPL-MCNC: 1.17 MG/DL (ref 0.6–1.3)
CREAT SERPL-MCNC: 1.22 MG/DL (ref 0.6–1.3)
CREAT SERPL-MCNC: 1.24 MG/DL (ref 0.6–1.3)
CREAT SERPL-MCNC: 1.49 MG/DL (ref 0.6–1.3)
CREAT SERPL-MCNC: 1.57 MG/DL (ref 0.6–1.3)
CREAT SERPL-MCNC: 1.64 MG/DL (ref 0.6–1.3)
CREAT SERPL-MCNC: 1.76 MG/DL (ref 0.6–1.3)
CREAT SERPL-MCNC: 1.8 MG/DL (ref 0.6–1.3)
CREAT SERPL-MCNC: 2.01 MG/DL (ref 0.6–1.3)
CREAT SERPL-MCNC: 2.16 MG/DL (ref 0.6–1.3)
CREAT SERPL-MCNC: 2.19 MG/DL (ref 0.6–1.3)
CROSSMATCH: NORMAL
CROSSMATCH: NORMAL
DIGOXIN SERPL-MCNC: 1.7 NG/ML (ref 0.8–2)
EOSINOPHIL # BLD AUTO: 0.01 THOUSAND/ΜL (ref 0–0.61)
EOSINOPHIL # BLD AUTO: 0.06 THOUSAND/ΜL (ref 0–0.61)
EOSINOPHIL # BLD AUTO: 0.08 THOUSAND/ΜL (ref 0–0.61)
EOSINOPHIL # BLD AUTO: 0.31 THOUSAND/ΜL (ref 0–0.61)
EOSINOPHIL # BLD MANUAL: 0 THOUSAND/UL (ref 0–0.4)
EOSINOPHIL NFR BLD AUTO: 0 % (ref 0–6)
EOSINOPHIL NFR BLD AUTO: 1 % (ref 0–6)
EOSINOPHIL NFR BLD AUTO: 1 % (ref 0–6)
EOSINOPHIL NFR BLD AUTO: 5 % (ref 0–6)
EOSINOPHIL NFR BLD MANUAL: 0 % (ref 0–6)
ERYTHROCYTE [DISTWIDTH] IN BLOOD BY AUTOMATED COUNT: 14 % (ref 11.6–15.1)
ERYTHROCYTE [DISTWIDTH] IN BLOOD BY AUTOMATED COUNT: 14.2 % (ref 11.6–15.1)
ERYTHROCYTE [DISTWIDTH] IN BLOOD BY AUTOMATED COUNT: 14.3 % (ref 11.6–15.1)
ERYTHROCYTE [DISTWIDTH] IN BLOOD BY AUTOMATED COUNT: 14.5 % (ref 11.6–15.1)
ERYTHROCYTE [DISTWIDTH] IN BLOOD BY AUTOMATED COUNT: 14.5 % (ref 11.6–15.1)
ERYTHROCYTE [DISTWIDTH] IN BLOOD BY AUTOMATED COUNT: 14.6 % (ref 11.6–15.1)
ERYTHROCYTE [DISTWIDTH] IN BLOOD BY AUTOMATED COUNT: 14.6 % (ref 11.6–15.1)
ERYTHROCYTE [DISTWIDTH] IN BLOOD BY AUTOMATED COUNT: 14.8 % (ref 11.6–15.1)
ERYTHROCYTE [DISTWIDTH] IN BLOOD BY AUTOMATED COUNT: 14.9 % (ref 11.6–15.1)
ETHANOL SERPL-MCNC: <3 MG/DL (ref 0–3)
ETHANOL SERPL-MCNC: <3 MG/DL (ref 0–3)
FINE GRAN CASTS URNS QL MICRO: ABNORMAL /LPF
FIO2 GAS DIL.REBREATH: 60 L
FLUAV RNA RESP QL NAA+PROBE: NEGATIVE
FLUAV RNA RESP QL NAA+PROBE: NEGATIVE
FLUBV RNA RESP QL NAA+PROBE: NEGATIVE
FLUBV RNA RESP QL NAA+PROBE: NEGATIVE
FOLATE SERPL-MCNC: >20 NG/ML (ref 3.1–17.5)
FOLATE SERPL-MCNC: >20 NG/ML (ref 3.1–17.5)
GFR SERPL CREATININE-BSD FRML MDRD: 19 ML/MIN/1.73SQ M
GFR SERPL CREATININE-BSD FRML MDRD: 19 ML/MIN/1.73SQ M
GFR SERPL CREATININE-BSD FRML MDRD: 21 ML/MIN/1.73SQ M
GFR SERPL CREATININE-BSD FRML MDRD: 24 ML/MIN/1.73SQ M
GFR SERPL CREATININE-BSD FRML MDRD: 25 ML/MIN/1.73SQ M
GFR SERPL CREATININE-BSD FRML MDRD: 27 ML/MIN/1.73SQ M
GFR SERPL CREATININE-BSD FRML MDRD: 28 ML/MIN/1.73SQ M
GFR SERPL CREATININE-BSD FRML MDRD: 30 ML/MIN/1.73SQ M
GFR SERPL CREATININE-BSD FRML MDRD: 38 ML/MIN/1.73SQ M
GFR SERPL CREATININE-BSD FRML MDRD: 39 ML/MIN/1.73SQ M
GFR SERPL CREATININE-BSD FRML MDRD: 41 ML/MIN/1.73SQ M
GFR SERPL CREATININE-BSD FRML MDRD: 55 ML/MIN/1.73SQ M
GFR SERPL CREATININE-BSD FRML MDRD: 61 ML/MIN/1.73SQ M
GLUCOSE P FAST SERPL-MCNC: 66 MG/DL (ref 65–99)
GLUCOSE SERPL-MCNC: 100 MG/DL (ref 65–140)
GLUCOSE SERPL-MCNC: 105 MG/DL (ref 65–140)
GLUCOSE SERPL-MCNC: 119 MG/DL (ref 65–140)
GLUCOSE SERPL-MCNC: 131 MG/DL (ref 65–140)
GLUCOSE SERPL-MCNC: 132 MG/DL (ref 65–140)
GLUCOSE SERPL-MCNC: 143 MG/DL (ref 65–140)
GLUCOSE SERPL-MCNC: 169 MG/DL (ref 65–140)
GLUCOSE SERPL-MCNC: 173 MG/DL (ref 65–140)
GLUCOSE SERPL-MCNC: 191 MG/DL (ref 65–140)
GLUCOSE SERPL-MCNC: 28 MG/DL (ref 65–140)
GLUCOSE SERPL-MCNC: 297 MG/DL (ref 65–140)
GLUCOSE SERPL-MCNC: 37 MG/DL (ref 65–140)
GLUCOSE SERPL-MCNC: 43 MG/DL (ref 65–140)
GLUCOSE SERPL-MCNC: 46 MG/DL (ref 65–140)
GLUCOSE SERPL-MCNC: 48 MG/DL (ref 65–140)
GLUCOSE SERPL-MCNC: 54 MG/DL (ref 65–140)
GLUCOSE SERPL-MCNC: 55 MG/DL (ref 65–140)
GLUCOSE SERPL-MCNC: 55 MG/DL (ref 65–140)
GLUCOSE SERPL-MCNC: 58 MG/DL (ref 65–140)
GLUCOSE SERPL-MCNC: 63 MG/DL (ref 65–140)
GLUCOSE SERPL-MCNC: 66 MG/DL (ref 65–140)
GLUCOSE SERPL-MCNC: 68 MG/DL (ref 65–140)
GLUCOSE SERPL-MCNC: 76 MG/DL (ref 65–140)
GLUCOSE SERPL-MCNC: 77 MG/DL (ref 65–140)
GLUCOSE SERPL-MCNC: 78 MG/DL (ref 65–140)
GLUCOSE SERPL-MCNC: 79 MG/DL (ref 65–140)
GLUCOSE SERPL-MCNC: 80 MG/DL (ref 65–140)
GLUCOSE SERPL-MCNC: 82 MG/DL (ref 65–140)
GLUCOSE SERPL-MCNC: 85 MG/DL (ref 65–140)
GLUCOSE SERPL-MCNC: <20 MG/DL (ref 65–140)
GLUCOSE UR STRIP-MCNC: NEGATIVE MG/DL
HCO3 BLDA-SCNC: 14.5 MMOL/L (ref 22–28)
HCO3 BLDA-SCNC: 16.1 MMOL/L (ref 24–30)
HCO3 BLDA-SCNC: 16.2 MMOL/L (ref 22–28)
HCO3 BLDA-SCNC: 17.6 MMOL/L (ref 22–28)
HCO3 BLDA-SCNC: 18.4 MMOL/L (ref 22–28)
HCO3 BLDA-SCNC: 9.8 MMOL/L (ref 22–28)
HCO3 BLDV-SCNC: 19.2 MMOL/L (ref 24–30)
HCT VFR BLD AUTO: 38.8 % (ref 34.8–46.1)
HCT VFR BLD AUTO: 46 % (ref 34.8–46.1)
HCT VFR BLD AUTO: 46 % (ref 34.8–46.1)
HCT VFR BLD AUTO: 46.6 % (ref 34.8–46.1)
HCT VFR BLD AUTO: 47.7 % (ref 34.8–46.1)
HCT VFR BLD AUTO: 47.7 % (ref 34.8–46.1)
HCT VFR BLD AUTO: 50 % (ref 34.8–46.1)
HCT VFR BLD AUTO: 51.5 % (ref 34.8–46.1)
HCT VFR BLD AUTO: 52.9 % (ref 34.8–46.1)
HCT VFR BLD AUTO: 57.9 % (ref 34.8–46.1)
HCT VFR BLD AUTO: 59.7 % (ref 34.8–46.1)
HCT VFR BLD CALC: 34 % (ref 34.8–46.1)
HCT VFR BLD CALC: 34 % (ref 34.8–46.1)
HCT VFR BLD CALC: 35 % (ref 34.8–46.1)
HGB BLD-MCNC: 12.5 G/DL (ref 11.5–15.4)
HGB BLD-MCNC: 14.5 G/DL (ref 11.5–15.4)
HGB BLD-MCNC: 14.7 G/DL (ref 11.5–15.4)
HGB BLD-MCNC: 15.1 G/DL (ref 11.5–15.4)
HGB BLD-MCNC: 15.3 G/DL (ref 11.5–15.4)
HGB BLD-MCNC: 15.4 G/DL (ref 11.5–15.4)
HGB BLD-MCNC: 16.1 G/DL (ref 11.5–15.4)
HGB BLD-MCNC: 16.8 G/DL (ref 11.5–15.4)
HGB BLD-MCNC: 17.2 G/DL (ref 11.5–15.4)
HGB BLD-MCNC: 18.4 G/DL (ref 11.5–15.4)
HGB BLD-MCNC: 18.5 G/DL (ref 11.5–15.4)
HGB BLDA-MCNC: 11.6 G/DL (ref 11.5–15.4)
HGB BLDA-MCNC: 11.6 G/DL (ref 11.5–15.4)
HGB BLDA-MCNC: 11.9 G/DL (ref 11.5–15.4)
HGB UR QL STRIP.AUTO: NEGATIVE
HOROWITZ INDEX BLDA+IHG-RTO: 100 MM[HG]
HOROWITZ INDEX BLDA+IHG-RTO: 60 MM[HG]
HOROWITZ INDEX BLDA+IHG-RTO: 60 MM[HG]
IMM GRANULOCYTES # BLD AUTO: 0.03 THOUSAND/UL (ref 0–0.2)
IMM GRANULOCYTES # BLD AUTO: 0.03 THOUSAND/UL (ref 0–0.2)
IMM GRANULOCYTES # BLD AUTO: 0.04 THOUSAND/UL (ref 0–0.2)
IMM GRANULOCYTES # BLD AUTO: 0.07 THOUSAND/UL (ref 0–0.2)
IMM GRANULOCYTES NFR BLD AUTO: 0 % (ref 0–2)
IMM GRANULOCYTES NFR BLD AUTO: 1 % (ref 0–2)
INR PPP: 1.16 (ref 0.84–1.19)
KETONES UR STRIP-MCNC: NEGATIVE MG/DL
LACTATE SERPL-SCNC: 12.8 MMOL/L (ref 0.5–2)
LACTATE SERPL-SCNC: 2.6 MMOL/L (ref 0.5–2)
LACTATE SERPL-SCNC: 3 MMOL/L (ref 0.5–2)
LACTATE SERPL-SCNC: 6.8 MMOL/L (ref 0.5–2)
LACTATE SERPL-SCNC: 7.2 MMOL/L (ref 0.5–2)
LACTATE SERPL-SCNC: 8.8 MMOL/L (ref 0.5–2)
LACTATE SERPL-SCNC: 9.2 MMOL/L (ref 0.5–2)
LEUKOCYTE ESTERASE UR QL STRIP: ABNORMAL
LEUKOCYTE ESTERASE UR QL STRIP: NEGATIVE
LEUKOCYTE ESTERASE UR QL STRIP: NEGATIVE
LYMPHOCYTES # BLD AUTO: 0 % (ref 14–44)
LYMPHOCYTES # BLD AUTO: 0 THOUSAND/UL (ref 0.6–4.47)
LYMPHOCYTES # BLD AUTO: 1.4 THOUSANDS/ΜL (ref 0.6–4.47)
LYMPHOCYTES # BLD AUTO: 1.67 THOUSANDS/ΜL (ref 0.6–4.47)
LYMPHOCYTES # BLD AUTO: 1.83 THOUSANDS/ΜL (ref 0.6–4.47)
LYMPHOCYTES # BLD AUTO: 2.69 THOUSANDS/ΜL (ref 0.6–4.47)
LYMPHOCYTES NFR BLD AUTO: 12 % (ref 14–44)
LYMPHOCYTES NFR BLD AUTO: 13 % (ref 14–44)
LYMPHOCYTES NFR BLD AUTO: 25 % (ref 14–44)
LYMPHOCYTES NFR BLD AUTO: 26 % (ref 14–44)
MAGNESIUM SERPL-MCNC: 1.9 MG/DL (ref 1.6–2.6)
MAGNESIUM SERPL-MCNC: 1.9 MG/DL (ref 1.6–2.6)
MAGNESIUM SERPL-MCNC: 2.2 MG/DL (ref 1.6–2.6)
MAGNESIUM SERPL-MCNC: 2.2 MG/DL (ref 1.6–2.6)
MAGNESIUM SERPL-MCNC: 3.3 MG/DL (ref 1.6–2.6)
MCH RBC QN AUTO: 31.8 PG (ref 26.8–34.3)
MCH RBC QN AUTO: 31.9 PG (ref 26.8–34.3)
MCH RBC QN AUTO: 31.9 PG (ref 26.8–34.3)
MCH RBC QN AUTO: 32 PG (ref 26.8–34.3)
MCH RBC QN AUTO: 32.1 PG (ref 26.8–34.3)
MCH RBC QN AUTO: 32.1 PG (ref 26.8–34.3)
MCH RBC QN AUTO: 32.2 PG (ref 26.8–34.3)
MCH RBC QN AUTO: 32.3 PG (ref 26.8–34.3)
MCH RBC QN AUTO: 32.3 PG (ref 26.8–34.3)
MCH RBC QN AUTO: 32.4 PG (ref 26.8–34.3)
MCH RBC QN AUTO: 32.6 PG (ref 26.8–34.3)
MCHC RBC AUTO-ENTMCNC: 31 G/DL (ref 31.4–37.4)
MCHC RBC AUTO-ENTMCNC: 31.5 G/DL (ref 31.4–37.4)
MCHC RBC AUTO-ENTMCNC: 31.7 G/DL (ref 31.4–37.4)
MCHC RBC AUTO-ENTMCNC: 31.8 G/DL (ref 31.4–37.4)
MCHC RBC AUTO-ENTMCNC: 32 G/DL (ref 31.4–37.4)
MCHC RBC AUTO-ENTMCNC: 32.1 G/DL (ref 31.4–37.4)
MCHC RBC AUTO-ENTMCNC: 32.2 G/DL (ref 31.4–37.4)
MCHC RBC AUTO-ENTMCNC: 32.2 G/DL (ref 31.4–37.4)
MCHC RBC AUTO-ENTMCNC: 32.5 G/DL (ref 31.4–37.4)
MCHC RBC AUTO-ENTMCNC: 32.6 G/DL (ref 31.4–37.4)
MCHC RBC AUTO-ENTMCNC: 33 G/DL (ref 31.4–37.4)
MCV RBC AUTO: 100 FL (ref 82–98)
MCV RBC AUTO: 100 FL (ref 82–98)
MCV RBC AUTO: 101 FL (ref 82–98)
MCV RBC AUTO: 102 FL (ref 82–98)
MCV RBC AUTO: 103 FL (ref 82–98)
MCV RBC AUTO: 97 FL (ref 82–98)
MCV RBC AUTO: 98 FL (ref 82–98)
MCV RBC AUTO: 98 FL (ref 82–98)
METAMYELOCYTES NFR BLD MANUAL: 9 % (ref 0–1)
MONOCYTES # BLD AUTO: 0.92 THOUSAND/ΜL (ref 0.17–1.22)
MONOCYTES # BLD AUTO: 0.93 THOUSAND/ΜL (ref 0.17–1.22)
MONOCYTES # BLD AUTO: 1.18 THOUSAND/ΜL (ref 0.17–1.22)
MONOCYTES # BLD AUTO: 1.23 THOUSAND/ΜL (ref 0.17–1.22)
MONOCYTES # BLD AUTO: 2.8 THOUSAND/UL (ref 0–1.22)
MONOCYTES NFR BLD AUTO: 12 % (ref 4–12)
MONOCYTES NFR BLD AUTO: 14 % (ref 4–12)
MONOCYTES NFR BLD AUTO: 8 % (ref 4–12)
MONOCYTES NFR BLD AUTO: 9 % (ref 4–12)
MONOCYTES NFR BLD: 15 % (ref 4–12)
NASAL CANNULA: 3.5
NEUTROPHILS # BLD AUTO: 12.25 THOUSANDS/ΜL (ref 1.85–7.62)
NEUTROPHILS # BLD AUTO: 3.77 THOUSANDS/ΜL (ref 1.85–7.62)
NEUTROPHILS # BLD AUTO: 6.17 THOUSANDS/ΜL (ref 1.85–7.62)
NEUTROPHILS # BLD AUTO: 7.96 THOUSANDS/ΜL (ref 1.85–7.62)
NEUTROPHILS # BLD MANUAL: 14.2 THOUSAND/UL (ref 1.85–7.62)
NEUTS BAND NFR BLD MANUAL: 30 % (ref 0–8)
NEUTS SEG NFR BLD AUTO: 46 % (ref 43–75)
NEUTS SEG NFR BLD AUTO: 55 % (ref 43–75)
NEUTS SEG NFR BLD AUTO: 60 % (ref 43–75)
NEUTS SEG NFR BLD AUTO: 76 % (ref 43–75)
NEUTS SEG NFR BLD AUTO: 79 % (ref 43–75)
NITRITE UR QL STRIP: NEGATIVE
NON-SQ EPI CELLS URNS QL MICRO: ABNORMAL /HPF
NRBC BLD AUTO-RTO: 0 /100 WBCS
NRBC BLD AUTO-RTO: 1 /100 WBCS
NRBC BLD AUTO-RTO: 5 /100 WBC (ref 0–2)
O2 CT BLDA-SCNC: 14.2 ML/DL (ref 16–23)
O2 CT BLDA-SCNC: 18.7 ML/DL (ref 16–23)
O2 CT BLDA-SCNC: 19.9 ML/DL (ref 16–23)
O2 CT BLDV-SCNC: 13.7 ML/DL
OXYHGB MFR BLDA: 96.5 % (ref 94–97)
OXYHGB MFR BLDA: 97.3 % (ref 94–97)
OXYHGB MFR BLDA: 98.9 % (ref 94–97)
PCO2 BLD: 17 MMOL/L (ref 21–32)
PCO2 BLD: 17 MMOL/L (ref 21–32)
PCO2 BLD: 19 MMOL/L (ref 21–32)
PCO2 BLD: 37.3 MM HG (ref 42–50)
PCO2 BLD: 39.2 MM HG (ref 36–44)
PCO2 BLD: 42.7 MM HG (ref 36–44)
PCO2 BLDA: 25.1 MM HG (ref 36–44)
PCO2 BLDA: 29.2 MM HG (ref 36–44)
PCO2 BLDA: 32.2 MM HG (ref 36–44)
PCO2 BLDV: 41.5 MM HG (ref 42–50)
PCO2 TEMP ADJ BLDA: 25.1 MM HG (ref 36–44)
PEEP RESPIRATORY: 10 CM[H2O]
PEEP RESPIRATORY: 6 CM[H2O]
PEEP RESPIRATORY: 6 CM[H2O]
PH BLD: 7.19 [PH] (ref 7.35–7.45)
PH BLD: 7.21 [PH] (ref 7.35–7.45)
PH BLD: 7.24 [PH] (ref 7.3–7.4)
PH BLD: 7.26 [PH] (ref 7.35–7.45)
PH BLDA: 7.21 [PH] (ref 7.35–7.45)
PH BLDA: 7.32 [PH] (ref 7.35–7.45)
PH BLDA: 7.38 [PH] (ref 7.35–7.45)
PH BLDV: 7.28 [PH] (ref 7.3–7.4)
PH UR STRIP.AUTO: 5.5 [PH]
PH UR STRIP.AUTO: 6 [PH] (ref 4.5–8)
PH UR STRIP.AUTO: 6.5 [PH] (ref 4.5–8)
PHOSPHATE SERPL-MCNC: 4.1 MG/DL (ref 2.3–4.1)
PHOSPHATE SERPL-MCNC: 4.8 MG/DL (ref 2.3–4.1)
PLATELET # BLD AUTO: 113 THOUSANDS/UL (ref 149–390)
PLATELET # BLD AUTO: 160 THOUSANDS/UL (ref 149–390)
PLATELET # BLD AUTO: 209 THOUSANDS/UL (ref 149–390)
PLATELET # BLD AUTO: 230 THOUSANDS/UL (ref 149–390)
PLATELET # BLD AUTO: 242 THOUSANDS/UL (ref 149–390)
PLATELET # BLD AUTO: 259 THOUSANDS/UL (ref 149–390)
PLATELET # BLD AUTO: 286 THOUSANDS/UL (ref 149–390)
PLATELET # BLD AUTO: 289 THOUSANDS/UL (ref 149–390)
PLATELET # BLD AUTO: 292 THOUSANDS/UL (ref 149–390)
PLATELET # BLD AUTO: 298 THOUSANDS/UL (ref 149–390)
PLATELET # BLD AUTO: 334 THOUSANDS/UL (ref 149–390)
PLATELET # BLD AUTO: 339 THOUSANDS/UL (ref 149–390)
PLATELET # BLD AUTO: 345 THOUSANDS/UL (ref 149–390)
PLATELET BLD QL SMEAR: ADEQUATE
PMV BLD AUTO: 10.4 FL (ref 8.9–12.7)
PMV BLD AUTO: 10.5 FL (ref 8.9–12.7)
PMV BLD AUTO: 10.8 FL (ref 8.9–12.7)
PMV BLD AUTO: 10.9 FL (ref 8.9–12.7)
PMV BLD AUTO: 11 FL (ref 8.9–12.7)
PMV BLD AUTO: 11.2 FL (ref 8.9–12.7)
PMV BLD AUTO: 11.6 FL (ref 8.9–12.7)
PMV BLD AUTO: 11.6 FL (ref 8.9–12.7)
PMV BLD AUTO: 11.7 FL (ref 8.9–12.7)
PMV BLD AUTO: 11.8 FL (ref 8.9–12.7)
PMV BLD AUTO: 11.8 FL (ref 8.9–12.7)
PMV BLD AUTO: 12.3 FL (ref 8.9–12.7)
PMV BLD AUTO: 12.5 FL (ref 8.9–12.7)
PO2 BLD: 105 MM HG (ref 75–129)
PO2 BLD: 109 MM HG (ref 35–45)
PO2 BLD: 128 MM HG (ref 75–129)
PO2 BLD: 266.6 MM HG (ref 75–129)
PO2 BLDA: 105.3 MM HG (ref 75–129)
PO2 BLDA: 266.6 MM HG (ref 75–129)
PO2 BLDA: 93.6 MM HG (ref 75–129)
PO2 BLDV: 44.2 MM HG (ref 35–45)
POIKILOCYTOSIS BLD QL SMEAR: PRESENT
POLYCHROMASIA BLD QL SMEAR: PRESENT
POTASSIUM BLD-SCNC: 2.7 MMOL/L (ref 3.5–5.3)
POTASSIUM BLD-SCNC: 3.4 MMOL/L (ref 3.5–5.3)
POTASSIUM BLD-SCNC: 3.5 MMOL/L (ref 3.5–5.3)
POTASSIUM SERPL-SCNC: 2.7 MMOL/L (ref 3.5–5.3)
POTASSIUM SERPL-SCNC: 3 MMOL/L (ref 3.5–5.3)
POTASSIUM SERPL-SCNC: 3 MMOL/L (ref 3.5–5.3)
POTASSIUM SERPL-SCNC: 3.1 MMOL/L (ref 3.5–5.3)
POTASSIUM SERPL-SCNC: 3.2 MMOL/L (ref 3.5–5.3)
POTASSIUM SERPL-SCNC: 3.3 MMOL/L (ref 3.5–5.3)
POTASSIUM SERPL-SCNC: 3.3 MMOL/L (ref 3.5–5.3)
POTASSIUM SERPL-SCNC: 3.7 MMOL/L (ref 3.5–5.3)
POTASSIUM SERPL-SCNC: 3.7 MMOL/L (ref 3.5–5.3)
POTASSIUM SERPL-SCNC: 3.8 MMOL/L (ref 3.5–5.3)
POTASSIUM SERPL-SCNC: 4 MMOL/L (ref 3.5–5.3)
POTASSIUM SERPL-SCNC: 4.3 MMOL/L (ref 3.5–5.3)
POTASSIUM SERPL-SCNC: 4.4 MMOL/L (ref 3.5–5.3)
POTASSIUM SERPL-SCNC: 4.8 MMOL/L (ref 3.5–5.3)
POTASSIUM SERPL-SCNC: 4.9 MMOL/L (ref 3.5–5.3)
PR INTERVAL: 853 MS
PROCALCITONIN SERPL-MCNC: 12.39 NG/ML
PROCALCITONIN SERPL-MCNC: 14 NG/ML
PROCALCITONIN SERPL-MCNC: 14.57 NG/ML
PROCALCITONIN SERPL-MCNC: 7.27 NG/ML
PROCALCITONIN SERPL-MCNC: 7.31 NG/ML
PROT SERPL-MCNC: 3.8 G/DL (ref 6.4–8.2)
PROT SERPL-MCNC: 5 G/DL (ref 6.4–8.2)
PROT SERPL-MCNC: 5.7 G/DL (ref 6.4–8.2)
PROT SERPL-MCNC: 5.7 G/DL (ref 6.4–8.2)
PROT SERPL-MCNC: 5.9 G/DL (ref 6.4–8.2)
PROT SERPL-MCNC: 7.2 G/DL (ref 6.4–8.2)
PROT SERPL-MCNC: 7.3 G/DL (ref 6.4–8.2)
PROT UR STRIP-MCNC: ABNORMAL MG/DL
PROT UR STRIP-MCNC: NEGATIVE MG/DL
PROT UR STRIP-MCNC: NEGATIVE MG/DL
PROTHROMBIN TIME: 14.8 SECONDS (ref 11.6–14.5)
QRS AXIS: -73 DEGREES
QRS AXIS: 102 DEGREES
QRS AXIS: 27 DEGREES
QRS AXIS: 51 DEGREES
QRS AXIS: 95 DEGREES
QRSD INTERVAL: 100 MS
QRSD INTERVAL: 84 MS
QRSD INTERVAL: 90 MS
QRSD INTERVAL: 92 MS
QRSD INTERVAL: 96 MS
QT INTERVAL: 320 MS
QT INTERVAL: 326 MS
QT INTERVAL: 371 MS
QT INTERVAL: 376 MS
QT INTERVAL: 440 MS
QTC INTERVAL: 401 MS
QTC INTERVAL: 408 MS
QTC INTERVAL: 419 MS
QTC INTERVAL: 421 MS
QTC INTERVAL: 446 MS
RBC # BLD AUTO: 3.87 MILLION/UL (ref 3.81–5.12)
RBC # BLD AUTO: 4.52 MILLION/UL (ref 3.81–5.12)
RBC # BLD AUTO: 4.54 MILLION/UL (ref 3.81–5.12)
RBC # BLD AUTO: 4.69 MILLION/UL (ref 3.81–5.12)
RBC # BLD AUTO: 4.7 MILLION/UL (ref 3.81–5.12)
RBC # BLD AUTO: 4.81 MILLION/UL (ref 3.81–5.12)
RBC # BLD AUTO: 5.01 MILLION/UL (ref 3.81–5.12)
RBC # BLD AUTO: 5.27 MILLION/UL (ref 3.81–5.12)
RBC # BLD AUTO: 5.39 MILLION/UL (ref 3.81–5.12)
RBC # BLD AUTO: 5.69 MILLION/UL (ref 3.81–5.12)
RBC # BLD AUTO: 5.81 MILLION/UL (ref 3.81–5.12)
RBC #/AREA URNS AUTO: ABNORMAL /HPF
RBC MORPH BLD: PRESENT
RH BLD: POSITIVE
RH BLD: POSITIVE
RSV RNA RESP QL NAA+PROBE: NEGATIVE
RSV RNA RESP QL NAA+PROBE: NEGATIVE
SALICYLATES SERPL-MCNC: <3 MG/DL (ref 3–20)
SAMPLE SITE: ABNORMAL
SAO2 % BLD FROM PO2: 96 % (ref 60–85)
SAO2 % BLD FROM PO2: 97 % (ref 60–85)
SAO2 % BLD FROM PO2: 98 % (ref 60–85)
SARS-COV-2 RNA RESP QL NAA+PROBE: NEGATIVE
SARS-COV-2 RNA RESP QL NAA+PROBE: NEGATIVE
SODIUM BLD-SCNC: 146 MMOL/L (ref 136–145)
SODIUM BLD-SCNC: 146 MMOL/L (ref 136–145)
SODIUM BLD-SCNC: 147 MMOL/L (ref 136–145)
SODIUM SERPL-SCNC: 140 MMOL/L (ref 136–145)
SODIUM SERPL-SCNC: 141 MMOL/L (ref 136–145)
SODIUM SERPL-SCNC: 144 MMOL/L (ref 136–145)
SODIUM SERPL-SCNC: 145 MMOL/L (ref 136–145)
SODIUM SERPL-SCNC: 146 MMOL/L (ref 136–145)
SODIUM SERPL-SCNC: 146 MMOL/L (ref 136–145)
SODIUM SERPL-SCNC: 147 MMOL/L (ref 136–145)
SODIUM SERPL-SCNC: 147 MMOL/L (ref 136–145)
SODIUM SERPL-SCNC: 148 MMOL/L (ref 136–145)
SODIUM SERPL-SCNC: 148 MMOL/L (ref 136–145)
SODIUM SERPL-SCNC: 151 MMOL/L (ref 136–145)
SODIUM SERPL-SCNC: 152 MMOL/L (ref 136–145)
SODIUM SERPL-SCNC: 154 MMOL/L (ref 136–145)
SP GR UR STRIP.AUTO: 1.01 (ref 1–1.03)
SP GR UR STRIP.AUTO: 1.02 (ref 1–1.03)
SP GR UR STRIP.AUTO: 1.02 (ref 1–1.03)
SPECIMEN EXPIRATION DATE: NORMAL
SPECIMEN SOURCE: ABNORMAL
T WAVE AXIS: -23 DEGREES
T WAVE AXIS: -39 DEGREES
T WAVE AXIS: -55 DEGREES
T WAVE AXIS: 4 DEGREES
T WAVE AXIS: 83 DEGREES
TROPONIN I SERPL-MCNC: 0.08 NG/ML
TROPONIN I SERPL-MCNC: 0.11 NG/ML
TROPONIN I SERPL-MCNC: 0.13 NG/ML
TROPONIN I SERPL-MCNC: 0.27 NG/ML
TROPONIN I SERPL-MCNC: 0.27 NG/ML
TROPONIN I SERPL-MCNC: 0.32 NG/ML
TROPONIN I SERPL-MCNC: 0.36 NG/ML
TROPONIN I SERPL-MCNC: <0.02 NG/ML
TSH SERPL DL<=0.05 MIU/L-ACNC: 2.74 UIU/ML (ref 0.36–3.74)
TSH SERPL DL<=0.05 MIU/L-ACNC: 3 UIU/ML (ref 0.36–3.74)
UNIT DISPENSE STATUS: NORMAL
UNIT PRODUCT CODE: NORMAL
UNIT RH: NORMAL
UROBILINOGEN UR QL STRIP.AUTO: 0.2 E.U./DL
VENT AC: 16
VENT- AC: AC
VENTRICULAR RATE: 104 BPM
VENTRICULAR RATE: 62 BPM
VENTRICULAR RATE: 70 BPM
VENTRICULAR RATE: 75 BPM
VENTRICULAR RATE: 94 BPM
VIT B12 SERPL-MCNC: 1311 PG/ML (ref 100–900)
VIT B12 SERPL-MCNC: 1838 PG/ML (ref 100–900)
VT SETTING VENT: 350 ML
WBC # BLD AUTO: 10.26 THOUSAND/UL (ref 4.31–10.16)
WBC # BLD AUTO: 10.44 THOUSAND/UL (ref 4.31–10.16)
WBC # BLD AUTO: 15.39 THOUSAND/UL (ref 4.31–10.16)
WBC # BLD AUTO: 18.45 THOUSAND/UL (ref 4.31–10.16)
WBC # BLD AUTO: 18.68 THOUSAND/UL (ref 4.31–10.16)
WBC # BLD AUTO: 20.7 THOUSAND/UL (ref 4.31–10.16)
WBC # BLD AUTO: 20.83 THOUSAND/UL (ref 4.31–10.16)
WBC # BLD AUTO: 22.19 THOUSAND/UL (ref 4.31–10.16)
WBC # BLD AUTO: 23.31 THOUSAND/UL (ref 4.31–10.16)
WBC # BLD AUTO: 6.76 THOUSAND/UL (ref 4.31–10.16)
WBC # BLD AUTO: 8.46 THOUSAND/UL (ref 4.31–10.16)
WBC #/AREA URNS AUTO: ABNORMAL /HPF
WBC TOXIC VACUOLES BLD QL SMEAR: PRESENT

## 2020-01-01 PROCEDURE — 88312 SPECIAL STAINS GROUP 1: CPT | Performed by: PATHOLOGY

## 2020-01-01 PROCEDURE — 85025 COMPLETE CBC W/AUTO DIFF WBC: CPT | Performed by: EMERGENCY MEDICINE

## 2020-01-01 PROCEDURE — 96360 HYDRATION IV INFUSION INIT: CPT

## 2020-01-01 PROCEDURE — 87077 CULTURE AEROBIC IDENTIFY: CPT | Performed by: FAMILY MEDICINE

## 2020-01-01 PROCEDURE — 84145 PROCALCITONIN (PCT): CPT | Performed by: PHYSICIAN ASSISTANT

## 2020-01-01 PROCEDURE — 80048 BASIC METABOLIC PNL TOTAL CA: CPT | Performed by: FAMILY MEDICINE

## 2020-01-01 PROCEDURE — 92610 EVALUATE SWALLOWING FUNCTION: CPT

## 2020-01-01 PROCEDURE — 83735 ASSAY OF MAGNESIUM: CPT | Performed by: PHYSICIAN ASSISTANT

## 2020-01-01 PROCEDURE — 85049 AUTOMATED PLATELET COUNT: CPT | Performed by: EMERGENCY MEDICINE

## 2020-01-01 PROCEDURE — 85027 COMPLETE CBC AUTOMATED: CPT | Performed by: PHYSICIAN ASSISTANT

## 2020-01-01 PROCEDURE — 0DJD0ZZ INSPECTION OF LOWER INTESTINAL TRACT, OPEN APPROACH: ICD-10-PCS | Performed by: SURGERY

## 2020-01-01 PROCEDURE — 83605 ASSAY OF LACTIC ACID: CPT | Performed by: FAMILY MEDICINE

## 2020-01-01 PROCEDURE — 84484 ASSAY OF TROPONIN QUANT: CPT | Performed by: PHYSICIAN ASSISTANT

## 2020-01-01 PROCEDURE — 73630 X-RAY EXAM OF FOOT: CPT

## 2020-01-01 PROCEDURE — 99285 EMERGENCY DEPT VISIT HI MDM: CPT

## 2020-01-01 PROCEDURE — 82805 BLOOD GASES W/O2 SATURATION: CPT | Performed by: EMERGENCY MEDICINE

## 2020-01-01 PROCEDURE — 99223 1ST HOSP IP/OBS HIGH 75: CPT | Performed by: INTERNAL MEDICINE

## 2020-01-01 PROCEDURE — 93010 ELECTROCARDIOGRAM REPORT: CPT | Performed by: INTERNAL MEDICINE

## 2020-01-01 PROCEDURE — 93005 ELECTROCARDIOGRAM TRACING: CPT

## 2020-01-01 PROCEDURE — 80048 BASIC METABOLIC PNL TOTAL CA: CPT | Performed by: PHYSICIAN ASSISTANT

## 2020-01-01 PROCEDURE — 99283 EMERGENCY DEPT VISIT LOW MDM: CPT | Performed by: EMERGENCY MEDICINE

## 2020-01-01 PROCEDURE — 70450 CT HEAD/BRAIN W/O DYE: CPT

## 2020-01-01 PROCEDURE — 94760 N-INVAS EAR/PLS OXIMETRY 1: CPT

## 2020-01-01 PROCEDURE — NC001 PR NO CHARGE: Performed by: PHYSICIAN ASSISTANT

## 2020-01-01 PROCEDURE — 88342 IMHCHEM/IMCYTCHM 1ST ANTB: CPT | Performed by: PATHOLOGY

## 2020-01-01 PROCEDURE — 76705 ECHO EXAM OF ABDOMEN: CPT

## 2020-01-01 PROCEDURE — G1004 CDSM NDSC: HCPCS

## 2020-01-01 PROCEDURE — 82330 ASSAY OF CALCIUM: CPT | Performed by: PHYSICIAN ASSISTANT

## 2020-01-01 PROCEDURE — 82948 REAGENT STRIP/BLOOD GLUCOSE: CPT

## 2020-01-01 PROCEDURE — 99233 SBSQ HOSP IP/OBS HIGH 50: CPT | Performed by: FAMILY MEDICINE

## 2020-01-01 PROCEDURE — 99222 1ST HOSP IP/OBS MODERATE 55: CPT | Performed by: INTERNAL MEDICINE

## 2020-01-01 PROCEDURE — 80320 DRUG SCREEN QUANTALCOHOLS: CPT | Performed by: EMERGENCY MEDICINE

## 2020-01-01 PROCEDURE — 0DJ60ZZ INSPECTION OF STOMACH, OPEN APPROACH: ICD-10-PCS | Performed by: SURGERY

## 2020-01-01 PROCEDURE — 88305 TISSUE EXAM BY PATHOLOGIST: CPT | Performed by: PATHOLOGY

## 2020-01-01 PROCEDURE — 0DH63UZ INSERTION OF FEEDING DEVICE INTO STOMACH, PERCUTANEOUS APPROACH: ICD-10-PCS | Performed by: INTERNAL MEDICINE

## 2020-01-01 PROCEDURE — 87040 BLOOD CULTURE FOR BACTERIA: CPT | Performed by: FAMILY MEDICINE

## 2020-01-01 PROCEDURE — 80329 ANALGESICS NON-OPIOID 1 OR 2: CPT | Performed by: EMERGENCY MEDICINE

## 2020-01-01 PROCEDURE — 82803 BLOOD GASES ANY COMBINATION: CPT

## 2020-01-01 PROCEDURE — 80048 BASIC METABOLIC PNL TOTAL CA: CPT | Performed by: INTERNAL MEDICINE

## 2020-01-01 PROCEDURE — 87493 C DIFF AMPLIFIED PROBE: CPT | Performed by: FAMILY MEDICINE

## 2020-01-01 PROCEDURE — 71045 X-RAY EXAM CHEST 1 VIEW: CPT

## 2020-01-01 PROCEDURE — 0DJV0ZZ INSPECTION OF MESENTERY, OPEN APPROACH: ICD-10-PCS | Performed by: SURGERY

## 2020-01-01 PROCEDURE — 84484 ASSAY OF TROPONIN QUANT: CPT | Performed by: EMERGENCY MEDICINE

## 2020-01-01 PROCEDURE — 5A1935Z RESPIRATORY VENTILATION, LESS THAN 24 CONSECUTIVE HOURS: ICD-10-PCS | Performed by: SURGERY

## 2020-01-01 PROCEDURE — 85027 COMPLETE CBC AUTOMATED: CPT | Performed by: FAMILY MEDICINE

## 2020-01-01 PROCEDURE — 83735 ASSAY OF MAGNESIUM: CPT | Performed by: EMERGENCY MEDICINE

## 2020-01-01 PROCEDURE — 0DB38ZX EXCISION OF LOWER ESOPHAGUS, VIA NATURAL OR ARTIFICIAL OPENING ENDOSCOPIC, DIAGNOSTIC: ICD-10-PCS | Performed by: INTERNAL MEDICINE

## 2020-01-01 PROCEDURE — NC001 PR NO CHARGE: Performed by: SURGERY

## 2020-01-01 PROCEDURE — 83735 ASSAY OF MAGNESIUM: CPT | Performed by: STUDENT IN AN ORGANIZED HEALTH CARE EDUCATION/TRAINING PROGRAM

## 2020-01-01 PROCEDURE — 74176 CT ABD & PELVIS W/O CONTRAST: CPT

## 2020-01-01 PROCEDURE — 82746 ASSAY OF FOLIC ACID SERUM: CPT | Performed by: PHYSICIAN ASSISTANT

## 2020-01-01 PROCEDURE — 85610 PROTHROMBIN TIME: CPT | Performed by: PHYSICIAN ASSISTANT

## 2020-01-01 PROCEDURE — 84443 ASSAY THYROID STIM HORMONE: CPT | Performed by: EMERGENCY MEDICINE

## 2020-01-01 PROCEDURE — 99239 HOSP IP/OBS DSCHRG MGMT >30: CPT | Performed by: STUDENT IN AN ORGANIZED HEALTH CARE EDUCATION/TRAINING PROGRAM

## 2020-01-01 PROCEDURE — 83605 ASSAY OF LACTIC ACID: CPT | Performed by: PHYSICIAN ASSISTANT

## 2020-01-01 PROCEDURE — 99220 PR INITIAL OBSERVATION CARE/DAY 70 MINUTES: CPT | Performed by: PHYSICIAN ASSISTANT

## 2020-01-01 PROCEDURE — 99024 POSTOP FOLLOW-UP VISIT: CPT | Performed by: ORTHOPAEDIC SURGERY

## 2020-01-01 PROCEDURE — 82805 BLOOD GASES W/O2 SATURATION: CPT | Performed by: PHYSICIAN ASSISTANT

## 2020-01-01 PROCEDURE — 99232 SBSQ HOSP IP/OBS MODERATE 35: CPT | Performed by: FAMILY MEDICINE

## 2020-01-01 PROCEDURE — 85025 COMPLETE CBC W/AUTO DIFF WBC: CPT | Performed by: STUDENT IN AN ORGANIZED HEALTH CARE EDUCATION/TRAINING PROGRAM

## 2020-01-01 PROCEDURE — 83735 ASSAY OF MAGNESIUM: CPT | Performed by: FAMILY MEDICINE

## 2020-01-01 PROCEDURE — 74018 RADEX ABDOMEN 1 VIEW: CPT

## 2020-01-01 PROCEDURE — 81003 URINALYSIS AUTO W/O SCOPE: CPT

## 2020-01-01 PROCEDURE — 86920 COMPATIBILITY TEST SPIN: CPT

## 2020-01-01 PROCEDURE — 0241U HB NFCT DS VIR RESP RNA 4 TRGT: CPT | Performed by: FAMILY MEDICINE

## 2020-01-01 PROCEDURE — 99232 SBSQ HOSP IP/OBS MODERATE 35: CPT | Performed by: NURSE PRACTITIONER

## 2020-01-01 PROCEDURE — 87186 SC STD MICRODIL/AGAR DIL: CPT | Performed by: FAMILY MEDICINE

## 2020-01-01 PROCEDURE — 84145 PROCALCITONIN (PCT): CPT | Performed by: FAMILY MEDICINE

## 2020-01-01 PROCEDURE — 36415 COLL VENOUS BLD VENIPUNCTURE: CPT | Performed by: PHYSICIAN ASSISTANT

## 2020-01-01 PROCEDURE — 93306 TTE W/DOPPLER COMPLETE: CPT

## 2020-01-01 PROCEDURE — 82140 ASSAY OF AMMONIA: CPT | Performed by: EMERGENCY MEDICINE

## 2020-01-01 PROCEDURE — 87040 BLOOD CULTURE FOR BACTERIA: CPT | Performed by: PHYSICIAN ASSISTANT

## 2020-01-01 PROCEDURE — 80053 COMPREHEN METABOLIC PANEL: CPT | Performed by: PHYSICIAN ASSISTANT

## 2020-01-01 PROCEDURE — 44140 PARTIAL REMOVAL OF COLON: CPT | Performed by: SURGERY

## 2020-01-01 PROCEDURE — 94002 VENT MGMT INPAT INIT DAY: CPT

## 2020-01-01 PROCEDURE — 94640 AIRWAY INHALATION TREATMENT: CPT

## 2020-01-01 PROCEDURE — 80076 HEPATIC FUNCTION PANEL: CPT | Performed by: EMERGENCY MEDICINE

## 2020-01-01 PROCEDURE — 84484 ASSAY OF TROPONIN QUANT: CPT

## 2020-01-01 PROCEDURE — 83605 ASSAY OF LACTIC ACID: CPT | Performed by: EMERGENCY MEDICINE

## 2020-01-01 PROCEDURE — 85014 HEMATOCRIT: CPT

## 2020-01-01 PROCEDURE — 87076 CULTURE ANAEROBE IDENT EACH: CPT | Performed by: PHYSICIAN ASSISTANT

## 2020-01-01 PROCEDURE — 99285 EMERGENCY DEPT VISIT HI MDM: CPT | Performed by: EMERGENCY MEDICINE

## 2020-01-01 PROCEDURE — C9113 INJ PANTOPRAZOLE SODIUM, VIA: HCPCS | Performed by: INTERNAL MEDICINE

## 2020-01-01 PROCEDURE — 88307 TISSUE EXAM BY PATHOLOGIST: CPT | Performed by: PATHOLOGY

## 2020-01-01 PROCEDURE — 84295 ASSAY OF SERUM SODIUM: CPT

## 2020-01-01 PROCEDURE — 94664 DEMO&/EVAL PT USE INHALER: CPT

## 2020-01-01 PROCEDURE — 82947 ASSAY GLUCOSE BLOOD QUANT: CPT

## 2020-01-01 PROCEDURE — 82330 ASSAY OF CALCIUM: CPT

## 2020-01-01 PROCEDURE — 71250 CT THORAX DX C-: CPT

## 2020-01-01 PROCEDURE — 74240 X-RAY XM UPR GI TRC 1CNTRST: CPT

## 2020-01-01 PROCEDURE — 85007 BL SMEAR W/DIFF WBC COUNT: CPT | Performed by: PHYSICIAN ASSISTANT

## 2020-01-01 PROCEDURE — 86901 BLOOD TYPING SEROLOGIC RH(D): CPT | Performed by: SURGERY

## 2020-01-01 PROCEDURE — C9113 INJ PANTOPRAZOLE SODIUM, VIA: HCPCS | Performed by: PHYSICIAN ASSISTANT

## 2020-01-01 PROCEDURE — 84132 ASSAY OF SERUM POTASSIUM: CPT

## 2020-01-01 PROCEDURE — 80053 COMPREHEN METABOLIC PANEL: CPT | Performed by: EMERGENCY MEDICINE

## 2020-01-01 PROCEDURE — 80162 ASSAY OF DIGOXIN TOTAL: CPT | Performed by: FAMILY MEDICINE

## 2020-01-01 PROCEDURE — 28470 CLTX METATARSAL FX WO MNP EA: CPT | Performed by: ORTHOPAEDIC SURGERY

## 2020-01-01 PROCEDURE — 85049 AUTOMATED PLATELET COUNT: CPT | Performed by: PHYSICIAN ASSISTANT

## 2020-01-01 PROCEDURE — 83605 ASSAY OF LACTIC ACID: CPT | Performed by: SURGERY

## 2020-01-01 PROCEDURE — 97163 PT EVAL HIGH COMPLEX 45 MIN: CPT

## 2020-01-01 PROCEDURE — 36415 COLL VENOUS BLD VENIPUNCTURE: CPT | Performed by: EMERGENCY MEDICINE

## 2020-01-01 PROCEDURE — 97167 OT EVAL HIGH COMPLEX 60 MIN: CPT

## 2020-01-01 PROCEDURE — 0DB48ZX EXCISION OF ESOPHAGOGASTRIC JUNCTION, VIA NATURAL OR ARTIFICIAL OPENING ENDOSCOPIC, DIAGNOSTIC: ICD-10-PCS | Performed by: INTERNAL MEDICINE

## 2020-01-01 PROCEDURE — 99284 EMERGENCY DEPT VISIT MOD MDM: CPT

## 2020-01-01 PROCEDURE — 99221 1ST HOSP IP/OBS SF/LOW 40: CPT | Performed by: PSYCHIATRY & NEUROLOGY

## 2020-01-01 PROCEDURE — 71046 X-RAY EXAM CHEST 2 VIEWS: CPT

## 2020-01-01 PROCEDURE — 82607 VITAMIN B-12: CPT | Performed by: PHYSICIAN ASSISTANT

## 2020-01-01 PROCEDURE — 84484 ASSAY OF TROPONIN QUANT: CPT | Performed by: FAMILY MEDICINE

## 2020-01-01 PROCEDURE — 99233 SBSQ HOSP IP/OBS HIGH 50: CPT | Performed by: INTERNAL MEDICINE

## 2020-01-01 PROCEDURE — 02HV33Z INSERTION OF INFUSION DEVICE INTO SUPERIOR VENA CAVA, PERCUTANEOUS APPROACH: ICD-10-PCS | Performed by: ANESTHESIOLOGY

## 2020-01-01 PROCEDURE — 85027 COMPLETE CBC AUTOMATED: CPT | Performed by: SURGERY

## 2020-01-01 PROCEDURE — 93306 TTE W/DOPPLER COMPLETE: CPT | Performed by: INTERNAL MEDICINE

## 2020-01-01 PROCEDURE — 99283 EMERGENCY DEPT VISIT LOW MDM: CPT

## 2020-01-01 PROCEDURE — 0241U HB NFCT DS VIR RESP RNA 4 TRGT: CPT | Performed by: EMERGENCY MEDICINE

## 2020-01-01 PROCEDURE — 84100 ASSAY OF PHOSPHORUS: CPT | Performed by: PHYSICIAN ASSISTANT

## 2020-01-01 PROCEDURE — 80053 COMPREHEN METABOLIC PANEL: CPT | Performed by: STUDENT IN AN ORGANIZED HEALTH CARE EDUCATION/TRAINING PROGRAM

## 2020-01-01 PROCEDURE — 92526 ORAL FUNCTION THERAPY: CPT

## 2020-01-01 PROCEDURE — 74150 CT ABDOMEN W/O CONTRAST: CPT

## 2020-01-01 PROCEDURE — 99232 SBSQ HOSP IP/OBS MODERATE 35: CPT | Performed by: PSYCHIATRY & NEUROLOGY

## 2020-01-01 PROCEDURE — 99213 OFFICE O/P EST LOW 20 MIN: CPT | Performed by: ORTHOPAEDIC SURGERY

## 2020-01-01 PROCEDURE — 86900 BLOOD TYPING SEROLOGIC ABO: CPT | Performed by: SURGERY

## 2020-01-01 PROCEDURE — 99232 SBSQ HOSP IP/OBS MODERATE 35: CPT | Performed by: STUDENT IN AN ORGANIZED HEALTH CARE EDUCATION/TRAINING PROGRAM

## 2020-01-01 PROCEDURE — 81001 URINALYSIS AUTO W/SCOPE: CPT | Performed by: FAMILY MEDICINE

## 2020-01-01 PROCEDURE — 84100 ASSAY OF PHOSPHORUS: CPT | Performed by: FAMILY MEDICINE

## 2020-01-01 PROCEDURE — 97605 NEG PRS WND THER DME<=50SQCM: CPT | Performed by: SURGERY

## 2020-01-01 PROCEDURE — 80053 COMPREHEN METABOLIC PANEL: CPT | Performed by: SURGERY

## 2020-01-01 PROCEDURE — 85730 THROMBOPLASTIN TIME PARTIAL: CPT | Performed by: PHYSICIAN ASSISTANT

## 2020-01-01 PROCEDURE — 99214 OFFICE O/P EST MOD 30 MIN: CPT | Performed by: INTERNAL MEDICINE

## 2020-01-01 PROCEDURE — 0DB68ZX EXCISION OF STOMACH, VIA NATURAL OR ARTIFICIAL OPENING ENDOSCOPIC, DIAGNOSTIC: ICD-10-PCS | Performed by: INTERNAL MEDICINE

## 2020-01-01 PROCEDURE — 87086 URINE CULTURE/COLONY COUNT: CPT | Performed by: FAMILY MEDICINE

## 2020-01-01 PROCEDURE — 99223 1ST HOSP IP/OBS HIGH 75: CPT | Performed by: SURGERY

## 2020-01-01 PROCEDURE — 86850 RBC ANTIBODY SCREEN: CPT | Performed by: SURGERY

## 2020-01-01 RX ORDER — QUETIAPINE FUMARATE 25 MG/1
12.5 TABLET, FILM COATED ORAL
Status: DISCONTINUED | OUTPATIENT
Start: 2020-01-01 | End: 2020-01-01 | Stop reason: HOSPADM

## 2020-01-01 RX ORDER — ACETAMINOPHEN 325 MG/1
650 TABLET ORAL EVERY 6 HOURS PRN
Status: DISCONTINUED | OUTPATIENT
Start: 2020-01-01 | End: 2020-01-01

## 2020-01-01 RX ORDER — DIPHENHYDRAMINE HYDROCHLORIDE 50 MG/ML
25 INJECTION INTRAMUSCULAR; INTRAVENOUS ONCE
Status: DISCONTINUED | OUTPATIENT
Start: 2020-01-01 | End: 2020-01-01

## 2020-01-01 RX ORDER — FUROSEMIDE 40 MG/1
40 TABLET ORAL 2 TIMES DAILY
Status: DISCONTINUED | OUTPATIENT
Start: 2020-01-01 | End: 2020-01-01

## 2020-01-01 RX ORDER — DEXTROSE MONOHYDRATE 25 G/50ML
INJECTION, SOLUTION INTRAVENOUS
Status: COMPLETED
Start: 2020-01-01 | End: 2020-01-01

## 2020-01-01 RX ORDER — DONEPEZIL HYDROCHLORIDE 10 MG/1
10 TABLET, FILM COATED ORAL
Status: DISCONTINUED | OUTPATIENT
Start: 2020-01-01 | End: 2020-01-01

## 2020-01-01 RX ORDER — HEPARIN SODIUM 5000 [USP'U]/ML
5000 INJECTION, SOLUTION INTRAVENOUS; SUBCUTANEOUS EVERY 8 HOURS SCHEDULED
Status: DISCONTINUED | OUTPATIENT
Start: 2020-01-01 | End: 2020-01-01

## 2020-01-01 RX ORDER — IPRATROPIUM BROMIDE 21 UG/1
SPRAY, METERED NASAL
COMMUNITY
Start: 2020-01-01 | End: 2020-01-01 | Stop reason: SDUPTHER

## 2020-01-01 RX ORDER — PROPOFOL 10 MG/ML
INJECTION, EMULSION INTRAVENOUS CONTINUOUS PRN
Status: DISCONTINUED | OUTPATIENT
Start: 2020-01-01 | End: 2020-01-01

## 2020-01-01 RX ORDER — ACETAMINOPHEN 325 MG/1
650 TABLET ORAL EVERY 6 HOURS PRN
Status: DISCONTINUED | OUTPATIENT
Start: 2020-01-01 | End: 2020-01-01 | Stop reason: HOSPADM

## 2020-01-01 RX ORDER — MEMANTINE HYDROCHLORIDE 10 MG/1
10 TABLET ORAL DAILY
Status: DISCONTINUED | OUTPATIENT
Start: 2020-01-01 | End: 2020-01-01 | Stop reason: HOSPADM

## 2020-01-01 RX ORDER — DEXTROSE AND SODIUM CHLORIDE 5; .45 G/100ML; G/100ML
125 INJECTION, SOLUTION INTRAVENOUS CONTINUOUS
Status: DISCONTINUED | OUTPATIENT
Start: 2020-01-01 | End: 2020-01-01

## 2020-01-01 RX ORDER — LIDOCAINE HYDROCHLORIDE 10 MG/ML
INJECTION, SOLUTION EPIDURAL; INFILTRATION; INTRACAUDAL; PERINEURAL AS NEEDED
Status: DISCONTINUED | OUTPATIENT
Start: 2020-01-01 | End: 2020-01-01

## 2020-01-01 RX ORDER — QUETIAPINE FUMARATE 25 MG/1
12.5 TABLET, FILM COATED ORAL
Status: DISCONTINUED | OUTPATIENT
Start: 2020-01-01 | End: 2020-01-01

## 2020-01-01 RX ORDER — SODIUM CHLORIDE 9 MG/ML
75 INJECTION, SOLUTION INTRAVENOUS CONTINUOUS
Status: DISCONTINUED | OUTPATIENT
Start: 2020-01-01 | End: 2020-01-01

## 2020-01-01 RX ORDER — GLYCOPYRROLATE 0.2 MG/ML
0.2 INJECTION INTRAMUSCULAR; INTRAVENOUS
Status: DISCONTINUED | OUTPATIENT
Start: 2020-01-01 | End: 2020-12-27 | Stop reason: HOSPADM

## 2020-01-01 RX ORDER — QUETIAPINE FUMARATE 25 MG/1
12.5 TABLET, FILM COATED ORAL
Qty: 15 TABLET | Refills: 0 | Status: SHIPPED | OUTPATIENT
Start: 2020-01-01 | End: 2020-01-01 | Stop reason: HOSPADM

## 2020-01-01 RX ORDER — METOPROLOL TARTRATE 50 MG/1
50 TABLET, FILM COATED ORAL EVERY EVENING
Status: DISCONTINUED | OUTPATIENT
Start: 2020-01-01 | End: 2020-01-01 | Stop reason: HOSPADM

## 2020-01-01 RX ORDER — SODIUM CHLORIDE, SODIUM LACTATE, POTASSIUM CHLORIDE, CALCIUM CHLORIDE 600; 310; 30; 20 MG/100ML; MG/100ML; MG/100ML; MG/100ML
INJECTION, SOLUTION INTRAVENOUS CONTINUOUS PRN
Status: DISCONTINUED | OUTPATIENT
Start: 2020-01-01 | End: 2020-01-01

## 2020-01-01 RX ORDER — DEXTROSE MONOHYDRATE 25 G/50ML
25 INJECTION, SOLUTION INTRAVENOUS ONCE
Status: DISCONTINUED | OUTPATIENT
Start: 2020-01-01 | End: 2020-01-01

## 2020-01-01 RX ORDER — FENTANYL CITRATE 50 UG/ML
100 INJECTION, SOLUTION INTRAMUSCULAR; INTRAVENOUS ONCE
Status: DISCONTINUED | OUTPATIENT
Start: 2020-01-01 | End: 2020-12-27 | Stop reason: HOSPADM

## 2020-01-01 RX ORDER — HYDRALAZINE HYDROCHLORIDE 20 MG/ML
5 INJECTION INTRAMUSCULAR; INTRAVENOUS EVERY 6 HOURS PRN
Status: DISCONTINUED | OUTPATIENT
Start: 2020-01-01 | End: 2020-01-01 | Stop reason: HOSPADM

## 2020-01-01 RX ORDER — ALBUTEROL SULFATE 2.5 MG/3ML
2.5 SOLUTION RESPIRATORY (INHALATION)
Status: DISCONTINUED | OUTPATIENT
Start: 2020-01-01 | End: 2020-01-01

## 2020-01-01 RX ORDER — LOSARTAN POTASSIUM 50 MG/1
50 TABLET ORAL DAILY
Status: DISCONTINUED | OUTPATIENT
Start: 2020-01-01 | End: 2020-01-01 | Stop reason: HOSPADM

## 2020-01-01 RX ORDER — SODIUM CHLORIDE 9 MG/ML
50 INJECTION, SOLUTION INTRAVENOUS CONTINUOUS
Status: CANCELLED | OUTPATIENT
Start: 2020-01-01

## 2020-01-01 RX ORDER — DEXTROSE MONOHYDRATE 25 G/50ML
50 INJECTION, SOLUTION INTRAVENOUS ONCE
Status: COMPLETED | OUTPATIENT
Start: 2020-01-01 | End: 2020-01-01

## 2020-01-01 RX ORDER — DEXTROSE, SODIUM CHLORIDE, SODIUM LACTATE, POTASSIUM CHLORIDE, AND CALCIUM CHLORIDE 5; .6; .31; .03; .02 G/100ML; G/100ML; G/100ML; G/100ML; G/100ML
75 INJECTION, SOLUTION INTRAVENOUS CONTINUOUS
Status: DISCONTINUED | OUTPATIENT
Start: 2020-01-01 | End: 2020-01-01

## 2020-01-01 RX ORDER — PROPOFOL 10 MG/ML
INJECTION, EMULSION INTRAVENOUS AS NEEDED
Status: DISCONTINUED | OUTPATIENT
Start: 2020-01-01 | End: 2020-01-01

## 2020-01-01 RX ORDER — SODIUM CHLORIDE, SODIUM GLUCONATE, SODIUM ACETATE, POTASSIUM CHLORIDE, MAGNESIUM CHLORIDE, SODIUM PHOSPHATE, DIBASIC, AND POTASSIUM PHOSPHATE .53; .5; .37; .037; .03; .012; .00082 G/100ML; G/100ML; G/100ML; G/100ML; G/100ML; G/100ML; G/100ML
1000 INJECTION, SOLUTION INTRAVENOUS ONCE
Status: COMPLETED | OUTPATIENT
Start: 2020-01-01 | End: 2020-01-01

## 2020-01-01 RX ORDER — ISOSORBIDE MONONITRATE 30 MG/1
30 TABLET, EXTENDED RELEASE ORAL DAILY
Status: DISCONTINUED | OUTPATIENT
Start: 2020-01-01 | End: 2020-01-01

## 2020-01-01 RX ORDER — CEFAZOLIN SODIUM 1 G/50ML
SOLUTION INTRAVENOUS AS NEEDED
Status: DISCONTINUED | OUTPATIENT
Start: 2020-01-01 | End: 2020-01-01

## 2020-01-01 RX ORDER — ATORVASTATIN CALCIUM 20 MG/1
20 TABLET, FILM COATED ORAL DAILY
Status: DISCONTINUED | OUTPATIENT
Start: 2020-01-01 | End: 2020-01-01 | Stop reason: HOSPADM

## 2020-01-01 RX ORDER — NITROFURANTOIN 25; 75 MG/1; MG/1
100 CAPSULE ORAL 2 TIMES DAILY
Qty: 10 CAPSULE | Refills: 0 | Status: SHIPPED | OUTPATIENT
Start: 2020-01-01 | End: 2020-01-01 | Stop reason: ALTCHOICE

## 2020-01-01 RX ORDER — PROPOFOL 10 MG/ML
5-50 INJECTION, EMULSION INTRAVENOUS
Status: DISCONTINUED | OUTPATIENT
Start: 2020-01-01 | End: 2020-01-01

## 2020-01-01 RX ORDER — CHLORHEXIDINE GLUCONATE 0.12 MG/ML
15 RINSE ORAL EVERY 12 HOURS SCHEDULED
Status: DISCONTINUED | OUTPATIENT
Start: 2020-01-01 | End: 2020-01-01

## 2020-01-01 RX ORDER — ATORVASTATIN CALCIUM 20 MG/1
20 TABLET, FILM COATED ORAL
Status: DISCONTINUED | OUTPATIENT
Start: 2020-01-01 | End: 2020-01-01

## 2020-01-01 RX ORDER — SODIUM CHLORIDE 9 MG/ML
INJECTION, SOLUTION INTRAVENOUS CONTINUOUS PRN
Status: DISCONTINUED | OUTPATIENT
Start: 2020-01-01 | End: 2020-01-01

## 2020-01-01 RX ORDER — LOSARTAN POTASSIUM 50 MG/1
50 TABLET ORAL DAILY
Status: DISCONTINUED | OUTPATIENT
Start: 2020-01-01 | End: 2020-01-01

## 2020-01-01 RX ORDER — PANTOPRAZOLE SODIUM 40 MG/1
40 INJECTION, POWDER, FOR SOLUTION INTRAVENOUS ONCE
Status: COMPLETED | OUTPATIENT
Start: 2020-01-01 | End: 2020-01-01

## 2020-01-01 RX ORDER — LOPERAMIDE HYDROCHLORIDE 2 MG/1
2 CAPSULE ORAL 4 TIMES DAILY PRN
Status: DISCONTINUED | OUTPATIENT
Start: 2020-01-01 | End: 2020-01-01

## 2020-01-01 RX ORDER — ALBUTEROL SULFATE 2.5 MG/3ML
2.5 SOLUTION RESPIRATORY (INHALATION) EVERY 6 HOURS PRN
Status: DISCONTINUED | OUTPATIENT
Start: 2020-01-01 | End: 2020-01-01 | Stop reason: HOSPADM

## 2020-01-01 RX ORDER — DEXTROSE MONOHYDRATE 50 MG/ML
100 INJECTION, SOLUTION INTRAVENOUS CONTINUOUS
Status: DISCONTINUED | OUTPATIENT
Start: 2020-01-01 | End: 2020-01-01

## 2020-01-01 RX ORDER — FLUTICASONE PROPIONATE 50 MCG
1 SPRAY, SUSPENSION (ML) NASAL DAILY
Status: DISCONTINUED | OUTPATIENT
Start: 2020-01-01 | End: 2020-01-01 | Stop reason: HOSPADM

## 2020-01-01 RX ORDER — POTASSIUM CHLORIDE 14.9 MG/ML
20 INJECTION INTRAVENOUS
Status: COMPLETED | OUTPATIENT
Start: 2020-01-01 | End: 2020-01-01

## 2020-01-01 RX ORDER — POTASSIUM CHLORIDE 14.9 MG/ML
INJECTION INTRAVENOUS CONTINUOUS PRN
Status: DISCONTINUED | OUTPATIENT
Start: 2020-01-01 | End: 2020-01-01

## 2020-01-01 RX ORDER — METOPROLOL TARTRATE 50 MG/1
50 TABLET, FILM COATED ORAL EVERY EVENING
Status: DISCONTINUED | OUTPATIENT
Start: 2020-01-01 | End: 2020-01-01

## 2020-01-01 RX ORDER — ALBUTEROL SULFATE 90 UG/1
1 AEROSOL, METERED RESPIRATORY (INHALATION) EVERY 12 HOURS
Status: DISCONTINUED | OUTPATIENT
Start: 2020-01-01 | End: 2020-01-01

## 2020-01-01 RX ORDER — LEVALBUTEROL 1.25 MG/.5ML
1.25 SOLUTION, CONCENTRATE RESPIRATORY (INHALATION)
Status: DISCONTINUED | OUTPATIENT
Start: 2020-01-01 | End: 2020-01-01

## 2020-01-01 RX ORDER — MAGNESIUM OXIDE 400 MG/1
1 TABLET ORAL DAILY
COMMUNITY
Start: 2020-01-01 | End: 2020-01-01 | Stop reason: HOSPADM

## 2020-01-01 RX ORDER — ISOSORBIDE MONONITRATE 30 MG/1
30 TABLET, EXTENDED RELEASE ORAL DAILY
Status: DISCONTINUED | OUTPATIENT
Start: 2020-01-01 | End: 2020-01-01 | Stop reason: HOSPADM

## 2020-01-01 RX ORDER — METOPROLOL TARTRATE 5 MG/5ML
5 INJECTION INTRAVENOUS EVERY 6 HOURS
Status: DISCONTINUED | OUTPATIENT
Start: 2020-01-01 | End: 2020-01-01

## 2020-01-01 RX ORDER — LORAZEPAM 2 MG/ML
1 INJECTION INTRAMUSCULAR
Status: DISCONTINUED | OUTPATIENT
Start: 2020-01-01 | End: 2020-12-27 | Stop reason: HOSPADM

## 2020-01-01 RX ORDER — ROCURONIUM BROMIDE 10 MG/ML
INJECTION, SOLUTION INTRAVENOUS AS NEEDED
Status: DISCONTINUED | OUTPATIENT
Start: 2020-01-01 | End: 2020-01-01

## 2020-01-01 RX ORDER — MIRTAZAPINE 15 MG/1
7.5 TABLET, FILM COATED ORAL
Status: DISCONTINUED | OUTPATIENT
Start: 2020-01-01 | End: 2020-01-01

## 2020-01-01 RX ORDER — LOSARTAN POTASSIUM 50 MG/1
50 TABLET ORAL DAILY
COMMUNITY
Start: 2020-01-01 | End: 2020-01-01 | Stop reason: HOSPADM

## 2020-01-01 RX ORDER — HYDROMORPHONE HCL/PF 1 MG/ML
0.2 SYRINGE (ML) INJECTION
Status: DISCONTINUED | OUTPATIENT
Start: 2020-01-01 | End: 2020-01-01

## 2020-01-01 RX ORDER — MEMANTINE HYDROCHLORIDE 5 MG/1
10 TABLET ORAL DAILY
Status: DISCONTINUED | OUTPATIENT
Start: 2020-01-01 | End: 2020-01-01

## 2020-01-01 RX ORDER — SODIUM CHLORIDE, SODIUM GLUCONATE, SODIUM ACETATE, POTASSIUM CHLORIDE, MAGNESIUM CHLORIDE, SODIUM PHOSPHATE, DIBASIC, AND POTASSIUM PHOSPHATE .53; .5; .37; .037; .03; .012; .00082 G/100ML; G/100ML; G/100ML; G/100ML; G/100ML; G/100ML; G/100ML
500 INJECTION, SOLUTION INTRAVENOUS ONCE
Status: COMPLETED | OUTPATIENT
Start: 2020-01-01 | End: 2020-01-01

## 2020-01-01 RX ORDER — MAGNESIUM HYDROXIDE/ALUMINUM HYDROXICE/SIMETHICONE 120; 1200; 1200 MG/30ML; MG/30ML; MG/30ML
30 SUSPENSION ORAL EVERY 6 HOURS PRN
Status: DISCONTINUED | OUTPATIENT
Start: 2020-01-01 | End: 2020-01-01

## 2020-01-01 RX ORDER — DONEPEZIL HYDROCHLORIDE 5 MG/1
10 TABLET, FILM COATED ORAL
Status: DISCONTINUED | OUTPATIENT
Start: 2020-01-01 | End: 2020-01-01 | Stop reason: HOSPADM

## 2020-01-01 RX ORDER — ETOMIDATE 2 MG/ML
INJECTION INTRAVENOUS AS NEEDED
Status: DISCONTINUED | OUTPATIENT
Start: 2020-01-01 | End: 2020-01-01

## 2020-01-01 RX ORDER — HYDRALAZINE HYDROCHLORIDE 20 MG/ML
5 INJECTION INTRAMUSCULAR; INTRAVENOUS ONCE
Status: COMPLETED | OUTPATIENT
Start: 2020-01-01 | End: 2020-01-01

## 2020-01-01 RX ORDER — ACETAMINOPHEN 650 MG/1
650 SUPPOSITORY RECTAL EVERY 4 HOURS PRN
Status: DISCONTINUED | OUTPATIENT
Start: 2020-01-01 | End: 2020-01-01

## 2020-01-01 RX ORDER — CHOLECALCIFEROL (VITAMIN D3) 125 MCG
100 CAPSULE ORAL DAILY
Status: DISCONTINUED | OUTPATIENT
Start: 2020-01-01 | End: 2020-01-01

## 2020-01-01 RX ORDER — ALBUMIN, HUMAN INJ 5% 5 %
SOLUTION INTRAVENOUS CONTINUOUS PRN
Status: DISCONTINUED | OUTPATIENT
Start: 2020-01-01 | End: 2020-01-01

## 2020-01-01 RX ORDER — FENTANYL CITRATE 50 UG/ML
100 INJECTION, SOLUTION INTRAMUSCULAR; INTRAVENOUS
Status: DISCONTINUED | OUTPATIENT
Start: 2020-01-01 | End: 2020-12-27 | Stop reason: HOSPADM

## 2020-01-01 RX ORDER — DIGOXIN 125 MCG
125 TABLET ORAL DAILY
Status: DISCONTINUED | OUTPATIENT
Start: 2020-01-01 | End: 2020-01-01 | Stop reason: HOSPADM

## 2020-01-01 RX ORDER — CALCIUM CHLORIDE 100 MG/ML
INJECTION INTRAVENOUS; INTRAVENTRICULAR AS NEEDED
Status: DISCONTINUED | OUTPATIENT
Start: 2020-01-01 | End: 2020-01-01

## 2020-01-01 RX ORDER — CEFAZOLIN SODIUM 1 G/50ML
1000 SOLUTION INTRAVENOUS ONCE
Status: COMPLETED | OUTPATIENT
Start: 2020-01-01 | End: 2020-01-01

## 2020-01-01 RX ORDER — MAGNESIUM HYDROXIDE 1200 MG/15ML
LIQUID ORAL AS NEEDED
Status: DISCONTINUED | OUTPATIENT
Start: 2020-01-01 | End: 2020-01-01 | Stop reason: HOSPADM

## 2020-01-01 RX ORDER — ROPINIROLE 0.25 MG/1
0.5 TABLET, FILM COATED ORAL
Status: DISCONTINUED | OUTPATIENT
Start: 2020-01-01 | End: 2020-01-01

## 2020-01-01 RX ORDER — NOREPINEPHRINE BITARTRATE 1 MG/ML
INJECTION, SOLUTION INTRAVENOUS
Status: COMPLETED
Start: 2020-01-01 | End: 2020-01-01

## 2020-01-01 RX ORDER — DIGOXIN 0.25 MG/ML
100 INJECTION INTRAMUSCULAR; INTRAVENOUS DAILY
Status: DISCONTINUED | OUTPATIENT
Start: 2020-01-01 | End: 2020-01-01

## 2020-01-01 RX ORDER — FUROSEMIDE 40 MG/1
40 TABLET ORAL DAILY
Status: DISCONTINUED | OUTPATIENT
Start: 2020-01-01 | End: 2020-01-01 | Stop reason: HOSPADM

## 2020-01-01 RX ORDER — DIGOXIN 125 MCG
125 TABLET ORAL DAILY
Status: DISCONTINUED | OUTPATIENT
Start: 2020-01-01 | End: 2020-01-01

## 2020-01-01 RX ORDER — PANTOPRAZOLE SODIUM 40 MG/1
40 TABLET, DELAYED RELEASE ORAL
Status: DISCONTINUED | OUTPATIENT
Start: 2020-01-01 | End: 2020-01-01 | Stop reason: HOSPADM

## 2020-01-01 RX ORDER — SODIUM CHLORIDE, SODIUM GLUCONATE, SODIUM ACETATE, POTASSIUM CHLORIDE, MAGNESIUM CHLORIDE, SODIUM PHOSPHATE, DIBASIC, AND POTASSIUM PHOSPHATE .53; .5; .37; .037; .03; .012; .00082 G/100ML; G/100ML; G/100ML; G/100ML; G/100ML; G/100ML; G/100ML
125 INJECTION, SOLUTION INTRAVENOUS CONTINUOUS
Status: DISCONTINUED | OUTPATIENT
Start: 2020-01-01 | End: 2020-01-01

## 2020-01-01 RX ORDER — PANTOPRAZOLE SODIUM 40 MG/1
40 TABLET, DELAYED RELEASE ORAL
Status: DISCONTINUED | OUTPATIENT
Start: 2020-01-01 | End: 2020-01-01

## 2020-01-01 RX ORDER — PANTOPRAZOLE SODIUM 40 MG/1
40 INJECTION, POWDER, FOR SOLUTION INTRAVENOUS
Status: DISCONTINUED | OUTPATIENT
Start: 2020-01-01 | End: 2020-01-01

## 2020-01-01 RX ORDER — MEMANTINE HYDROCHLORIDE 5 MG/1
5 TABLET ORAL DAILY
Status: DISCONTINUED | OUTPATIENT
Start: 2020-01-01 | End: 2020-01-01

## 2020-01-01 RX ORDER — LORAZEPAM 2 MG/ML
2 INJECTION INTRAMUSCULAR ONCE
Status: DISCONTINUED | OUTPATIENT
Start: 2020-01-01 | End: 2020-12-27 | Stop reason: HOSPADM

## 2020-01-01 RX ORDER — MULTIVIT-MIN/IRON/FOLIC ACID/K 18-600-40
1 CAPSULE ORAL DAILY
COMMUNITY
End: 2020-01-01 | Stop reason: HOSPADM

## 2020-01-01 RX ORDER — SODIUM CHLORIDE, SODIUM GLUCONATE, SODIUM ACETATE, POTASSIUM CHLORIDE, MAGNESIUM CHLORIDE, SODIUM PHOSPHATE, DIBASIC, AND POTASSIUM PHOSPHATE .53; .5; .37; .037; .03; .012; .00082 G/100ML; G/100ML; G/100ML; G/100ML; G/100ML; G/100ML; G/100ML
1000 INJECTION, SOLUTION INTRAVENOUS ONCE
Status: DISCONTINUED | OUTPATIENT
Start: 2020-01-01 | End: 2020-12-27 | Stop reason: HOSPADM

## 2020-01-01 RX ORDER — SODIUM CHLORIDE, SODIUM GLUCONATE, SODIUM ACETATE, POTASSIUM CHLORIDE, MAGNESIUM CHLORIDE, SODIUM PHOSPHATE, DIBASIC, AND POTASSIUM PHOSPHATE .53; .5; .37; .037; .03; .012; .00082 G/100ML; G/100ML; G/100ML; G/100ML; G/100ML; G/100ML; G/100ML
100 INJECTION, SOLUTION INTRAVENOUS CONTINUOUS
Status: DISCONTINUED | OUTPATIENT
Start: 2020-01-01 | End: 2020-01-01

## 2020-01-01 RX ORDER — SODIUM CHLORIDE 9 MG/ML
125 INJECTION, SOLUTION INTRAVENOUS CONTINUOUS
Status: CANCELLED | OUTPATIENT
Start: 2020-01-01

## 2020-01-01 RX ORDER — METOPROLOL TARTRATE 5 MG/5ML
2.5 INJECTION INTRAVENOUS ONCE
Status: COMPLETED | OUTPATIENT
Start: 2020-01-01 | End: 2020-01-01

## 2020-01-01 RX ORDER — FUROSEMIDE 10 MG/ML
20 INJECTION INTRAMUSCULAR; INTRAVENOUS
Status: DISCONTINUED | OUTPATIENT
Start: 2020-01-01 | End: 2020-01-01

## 2020-01-01 RX ORDER — FENTANYL CITRATE-0.9 % NACL/PF 10 MCG/ML
50 PLASTIC BAG, INJECTION (ML) INTRAVENOUS CONTINUOUS
Status: DISCONTINUED | OUTPATIENT
Start: 2020-01-01 | End: 2020-01-01

## 2020-01-01 RX ORDER — SODIUM CHLORIDE 9 MG/ML
100 INJECTION, SOLUTION INTRAVENOUS CONTINUOUS
Status: DISCONTINUED | OUTPATIENT
Start: 2020-01-01 | End: 2020-01-01

## 2020-01-01 RX ADMIN — MAGNESIUM OXIDE TAB 400 MG (241.3 MG ELEMENTAL MG) 400 MG: 400 (241.3 MG) TAB at 09:05

## 2020-01-01 RX ADMIN — LOPERAMIDE HYDROCHLORIDE 2 MG: 2 CAPSULE ORAL at 17:25

## 2020-01-01 RX ADMIN — SODIUM CHLORIDE, SODIUM GLUCONATE, SODIUM ACETATE, POTASSIUM CHLORIDE, MAGNESIUM CHLORIDE, SODIUM PHOSPHATE, DIBASIC, AND POTASSIUM PHOSPHATE 100 ML/HR: .53; .5; .37; .037; .03; .012; .00082 INJECTION, SOLUTION INTRAVENOUS at 09:34

## 2020-01-01 RX ADMIN — QUETIAPINE FUMARATE 12.5 MG: 25 TABLET ORAL at 22:29

## 2020-01-01 RX ADMIN — METOROPROLOL TARTRATE 5 MG: 5 INJECTION, SOLUTION INTRAVENOUS at 00:25

## 2020-01-01 RX ADMIN — DIGOXIN 125 MCG: 125 TABLET ORAL at 09:03

## 2020-01-01 RX ADMIN — PROPOFOL 10 MG: 10 INJECTION, EMULSION INTRAVENOUS at 10:52

## 2020-01-01 RX ADMIN — Medication 500 MG: at 07:10

## 2020-01-01 RX ADMIN — NITROGLYCERIN 1 INCH: 20 OINTMENT TOPICAL at 17:17

## 2020-01-01 RX ADMIN — POTASSIUM CHLORIDE 20 MEQ: 14.9 INJECTION, SOLUTION INTRAVENOUS at 08:59

## 2020-01-01 RX ADMIN — DONEPEZIL HYDROCHLORIDE 10 MG: 10 TABLET ORAL at 21:39

## 2020-01-01 RX ADMIN — ROCURONIUM BROMIDE 30 MG: 50 INJECTION, SOLUTION INTRAVENOUS at 06:59

## 2020-01-01 RX ADMIN — HEPARIN SODIUM 5000 UNITS: 5000 INJECTION INTRAVENOUS; SUBCUTANEOUS at 15:22

## 2020-01-01 RX ADMIN — METOROPROLOL TARTRATE 5 MG: 5 INJECTION, SOLUTION INTRAVENOUS at 01:18

## 2020-01-01 RX ADMIN — IPRATROPIUM BROMIDE 0.5 MG: 0.5 SOLUTION RESPIRATORY (INHALATION) at 13:24

## 2020-01-01 RX ADMIN — SODIUM CHLORIDE, SODIUM LACTATE, POTASSIUM CHLORIDE, AND CALCIUM CHLORIDE: .6; .31; .03; .02 INJECTION, SOLUTION INTRAVENOUS at 07:19

## 2020-01-01 RX ADMIN — METOROPROLOL TARTRATE 5 MG: 5 INJECTION, SOLUTION INTRAVENOUS at 18:15

## 2020-01-01 RX ADMIN — CEFTRIAXONE SODIUM 1000 MG: 10 INJECTION, POWDER, FOR SOLUTION INTRAVENOUS at 14:48

## 2020-01-01 RX ADMIN — APIXABAN 2.5 MG: 2.5 TABLET, FILM COATED ORAL at 09:04

## 2020-01-01 RX ADMIN — SODIUM CHLORIDE, SODIUM GLUCONATE, SODIUM ACETATE, POTASSIUM CHLORIDE, MAGNESIUM CHLORIDE, SODIUM PHOSPHATE, DIBASIC, AND POTASSIUM PHOSPHATE 100 ML/HR: .53; .5; .37; .037; .03; .012; .00082 INJECTION, SOLUTION INTRAVENOUS at 14:46

## 2020-01-01 RX ADMIN — HEPARIN SODIUM 5000 UNITS: 5000 INJECTION INTRAVENOUS; SUBCUTANEOUS at 05:45

## 2020-01-01 RX ADMIN — METOROPROLOL TARTRATE 5 MG: 5 INJECTION, SOLUTION INTRAVENOUS at 14:39

## 2020-01-01 RX ADMIN — CALCIUM CHLORIDE 0.25 G: 100 INJECTION INTRAVENOUS; INTRAVENTRICULAR at 07:51

## 2020-01-01 RX ADMIN — NOREPINEPHRINE BITARTRATE 4 MG: 1 INJECTION, SOLUTION, CONCENTRATE INTRAVENOUS at 10:43

## 2020-01-01 RX ADMIN — METOROPROLOL TARTRATE 5 MG: 5 INJECTION, SOLUTION INTRAVENOUS at 00:35

## 2020-01-01 RX ADMIN — DEXTROSE MONOHYDRATE 50 ML: 500 INJECTION PARENTERAL at 02:24

## 2020-01-01 RX ADMIN — DIGOXIN 125 MCG: 125 TABLET ORAL at 09:00

## 2020-01-01 RX ADMIN — NITROGLYCERIN 1 INCH: 20 OINTMENT TOPICAL at 17:25

## 2020-01-01 RX ADMIN — DIGOXIN 125 MCG: 125 TABLET ORAL at 08:49

## 2020-01-01 RX ADMIN — FUROSEMIDE 20 MG: 10 INJECTION, SOLUTION INTRAMUSCULAR; INTRAVENOUS at 08:59

## 2020-01-01 RX ADMIN — METOROPROLOL TARTRATE 2.5 MG: 5 INJECTION, SOLUTION INTRAVENOUS at 22:55

## 2020-01-01 RX ADMIN — NOREPINEPHRINE BITARTRATE 5 MCG/MIN: 1 INJECTION, SOLUTION, CONCENTRATE INTRAVENOUS at 07:00

## 2020-01-01 RX ADMIN — SODIUM BICARBONATE 25 MEQ: 84 INJECTION, SOLUTION INTRAVENOUS at 08:08

## 2020-01-01 RX ADMIN — CEFAZOLIN SODIUM 1000 MG: 1 SOLUTION INTRAVENOUS at 10:52

## 2020-01-01 RX ADMIN — HEPARIN SODIUM 5000 UNITS: 5000 INJECTION INTRAVENOUS; SUBCUTANEOUS at 06:18

## 2020-01-01 RX ADMIN — DEXTROSE MONOHYDRATE 50 ML: 25 INJECTION, SOLUTION INTRAVENOUS at 18:09

## 2020-01-01 RX ADMIN — PANTOPRAZOLE SODIUM 40 MG: 40 INJECTION, POWDER, FOR SOLUTION INTRAVENOUS at 16:07

## 2020-01-01 RX ADMIN — NITROGLYCERIN 1 INCH: 20 OINTMENT TOPICAL at 18:31

## 2020-01-01 RX ADMIN — DEXTROSE MONOHYDRATE 50 ML: 500 INJECTION PARENTERAL at 14:07

## 2020-01-01 RX ADMIN — DIGOXIN 100 MCG: 250 INJECTION, SOLUTION INTRAMUSCULAR; INTRAVENOUS; PARENTERAL at 09:21

## 2020-01-01 RX ADMIN — HEPARIN SODIUM 5000 UNITS: 5000 INJECTION INTRAVENOUS; SUBCUTANEOUS at 13:56

## 2020-01-01 RX ADMIN — PROPOFOL 20 MG: 10 INJECTION, EMULSION INTRAVENOUS at 13:31

## 2020-01-01 RX ADMIN — DEXTROSE 100 ML/HR: 5 SOLUTION INTRAVENOUS at 11:56

## 2020-01-01 RX ADMIN — IPRATROPIUM BROMIDE 1 PUFF: 17 AEROSOL, METERED RESPIRATORY (INHALATION) at 09:06

## 2020-01-01 RX ADMIN — HEPARIN SODIUM 5000 UNITS: 5000 INJECTION INTRAVENOUS; SUBCUTANEOUS at 06:24

## 2020-01-01 RX ADMIN — VASOPRESSIN 0.04 UNITS/MIN: 20 INJECTION INTRAVENOUS at 10:44

## 2020-01-01 RX ADMIN — PANTOPRAZOLE SODIUM 40 MG: 40 TABLET, DELAYED RELEASE ORAL at 05:26

## 2020-01-01 RX ADMIN — DEXTROSE AND SODIUM CHLORIDE 125 ML/HR: 5; .45 INJECTION, SOLUTION INTRAVENOUS at 14:05

## 2020-01-01 RX ADMIN — APIXABAN 2.5 MG: 2.5 TABLET, FILM COATED ORAL at 08:59

## 2020-01-01 RX ADMIN — PROPOFOL 40 MG: 10 INJECTION, EMULSION INTRAVENOUS at 13:25

## 2020-01-01 RX ADMIN — CEFTRIAXONE SODIUM 1000 MG: 10 INJECTION, POWDER, FOR SOLUTION INTRAVENOUS at 14:49

## 2020-01-01 RX ADMIN — HEPARIN SODIUM 5000 UNITS: 5000 INJECTION INTRAVENOUS; SUBCUTANEOUS at 14:54

## 2020-01-01 RX ADMIN — ISOSORBIDE MONONITRATE 30 MG: 30 TABLET, EXTENDED RELEASE ORAL at 09:03

## 2020-01-01 RX ADMIN — MEMANTINE 5 MG: 5 TABLET ORAL at 08:49

## 2020-01-01 RX ADMIN — SODIUM CHLORIDE 100 ML/HR: 0.9 INJECTION, SOLUTION INTRAVENOUS at 14:37

## 2020-01-01 RX ADMIN — HEPARIN SODIUM 5000 UNITS: 5000 INJECTION INTRAVENOUS; SUBCUTANEOUS at 21:27

## 2020-01-01 RX ADMIN — ALBUTEROL SULFATE 1 PUFF: 90 AEROSOL, METERED RESPIRATORY (INHALATION) at 22:56

## 2020-01-01 RX ADMIN — PHENYLEPHRINE HYDROCHLORIDE 200 MCG: 10 INJECTION INTRAVENOUS at 08:14

## 2020-01-01 RX ADMIN — DEXTROSE, SODIUM CHLORIDE, SODIUM LACTATE, POTASSIUM CHLORIDE, AND CALCIUM CHLORIDE 75 ML/HR: 5; .6; .31; .03; .02 INJECTION, SOLUTION INTRAVENOUS at 06:07

## 2020-01-01 RX ADMIN — HYDROCORTISONE SODIUM SUCCINATE 100 MG: 100 INJECTION, POWDER, FOR SOLUTION INTRAMUSCULAR; INTRAVENOUS at 10:43

## 2020-01-01 RX ADMIN — DIATRIZOATE MEGLUMINE AND DIATRIZOATE SODIUM 30 ML: 660; 100 LIQUID ORAL; RECTAL at 00:20

## 2020-01-01 RX ADMIN — ETOMIDATE 6 MG: 20 INJECTION, SOLUTION INTRAVENOUS at 06:58

## 2020-01-01 RX ADMIN — ROPINIROLE HYDROCHLORIDE 0.5 MG: 0.25 TABLET, FILM COATED ORAL at 21:27

## 2020-01-01 RX ADMIN — APIXABAN 2.5 MG: 2.5 TABLET, FILM COATED ORAL at 19:20

## 2020-01-01 RX ADMIN — SODIUM CHLORIDE, SODIUM GLUCONATE, SODIUM ACETATE, POTASSIUM CHLORIDE, MAGNESIUM CHLORIDE, SODIUM PHOSPHATE, DIBASIC, AND POTASSIUM PHOSPHATE 500 ML: .53; .5; .37; .037; .03; .012; .00082 INJECTION, SOLUTION INTRAVENOUS at 13:56

## 2020-01-01 RX ADMIN — METOPROLOL TARTRATE 50 MG: 50 TABLET, FILM COATED ORAL at 19:19

## 2020-01-01 RX ADMIN — PIPERACILLIN AND TAZOBACTAM 2.25 G: 2; .25 INJECTION, POWDER, LYOPHILIZED, FOR SOLUTION INTRAVENOUS at 10:39

## 2020-01-01 RX ADMIN — ATORVASTATIN CALCIUM 20 MG: 40 TABLET, FILM COATED ORAL at 08:58

## 2020-01-01 RX ADMIN — PHENYLEPHRINE HYDROCHLORIDE 200 MCG: 10 INJECTION INTRAVENOUS at 06:58

## 2020-01-01 RX ADMIN — SODIUM CHLORIDE, SODIUM GLUCONATE, SODIUM ACETATE, POTASSIUM CHLORIDE, MAGNESIUM CHLORIDE, SODIUM PHOSPHATE, DIBASIC, AND POTASSIUM PHOSPHATE 1000 ML: .53; .5; .37; .037; .03; .012; .00082 INJECTION, SOLUTION INTRAVENOUS at 09:19

## 2020-01-01 RX ADMIN — VASOPRESSIN 0.04 UNITS/MIN: 20 INJECTION INTRAVENOUS at 17:50

## 2020-01-01 RX ADMIN — LEVALBUTEROL HYDROCHLORIDE 1.25 MG: 1.25 SOLUTION, CONCENTRATE RESPIRATORY (INHALATION) at 13:24

## 2020-01-01 RX ADMIN — IPRATROPIUM BROMIDE 1 PUFF: 17 AEROSOL, METERED RESPIRATORY (INHALATION) at 09:05

## 2020-01-01 RX ADMIN — HEPARIN SODIUM 5000 UNITS: 5000 INJECTION INTRAVENOUS; SUBCUTANEOUS at 05:10

## 2020-01-01 RX ADMIN — HEPARIN SODIUM 5000 UNITS: 5000 INJECTION INTRAVENOUS; SUBCUTANEOUS at 13:58

## 2020-01-01 RX ADMIN — METOPROLOL TARTRATE 50 MG: 50 TABLET, FILM COATED ORAL at 00:56

## 2020-01-01 RX ADMIN — PIPERACILLIN AND TAZOBACTAM 2.25 G: 2; .25 INJECTION, POWDER, LYOPHILIZED, FOR SOLUTION INTRAVENOUS at 15:57

## 2020-01-01 RX ADMIN — METOROPROLOL TARTRATE 5 MG: 5 INJECTION, SOLUTION INTRAVENOUS at 06:19

## 2020-01-01 RX ADMIN — POTASSIUM CHLORIDE 20 MEQ: 14.9 INJECTION, SOLUTION INTRAVENOUS at 12:19

## 2020-01-01 RX ADMIN — DONEPEZIL HYDROCHLORIDE 10 MG: 5 TABLET, FILM COATED ORAL at 22:29

## 2020-01-01 RX ADMIN — Medication 50 MCG/HR: at 13:12

## 2020-01-01 RX ADMIN — DONEPEZIL HYDROCHLORIDE 10 MG: 5 TABLET, FILM COATED ORAL at 00:45

## 2020-01-01 RX ADMIN — PHENYLEPHRINE HYDROCHLORIDE 200 MCG: 10 INJECTION INTRAVENOUS at 08:35

## 2020-01-01 RX ADMIN — MAGNESIUM OXIDE TAB 400 MG (241.3 MG ELEMENTAL MG) 400 MG: 400 (241.3 MG) TAB at 08:59

## 2020-01-01 RX ADMIN — PIPERACILLIN AND TAZOBACTAM 4.5 G: 36; 4.5 INJECTION, POWDER, FOR SOLUTION INTRAVENOUS at 03:56

## 2020-01-01 RX ADMIN — POTASSIUM CHLORIDE 20 MEQ: 14.9 INJECTION, SOLUTION INTRAVENOUS at 12:36

## 2020-01-01 RX ADMIN — PROPOFOL 50 MCG/KG/MIN: 10 INJECTION, EMULSION INTRAVENOUS at 10:50

## 2020-01-01 RX ADMIN — IPRATROPIUM BROMIDE 0.5 MG: 0.5 SOLUTION RESPIRATORY (INHALATION) at 19:10

## 2020-01-01 RX ADMIN — SODIUM CHLORIDE, SODIUM GLUCONATE, SODIUM ACETATE, POTASSIUM CHLORIDE, MAGNESIUM CHLORIDE, SODIUM PHOSPHATE, DIBASIC, AND POTASSIUM PHOSPHATE 1000 ML: .53; .5; .37; .037; .03; .012; .00082 INJECTION, SOLUTION INTRAVENOUS at 14:07

## 2020-01-01 RX ADMIN — SODIUM CHLORIDE, SODIUM GLUCONATE, SODIUM ACETATE, POTASSIUM CHLORIDE, MAGNESIUM CHLORIDE, SODIUM PHOSPHATE, DIBASIC, AND POTASSIUM PHOSPHATE 100 ML/HR: .53; .5; .37; .037; .03; .012; .00082 INJECTION, SOLUTION INTRAVENOUS at 10:06

## 2020-01-01 RX ADMIN — IPRATROPIUM BROMIDE 1 PUFF: 17 AEROSOL, METERED RESPIRATORY (INHALATION) at 22:56

## 2020-01-01 RX ADMIN — SODIUM CHLORIDE, SODIUM GLUCONATE, SODIUM ACETATE, POTASSIUM CHLORIDE, MAGNESIUM CHLORIDE, SODIUM PHOSPHATE, DIBASIC, AND POTASSIUM PHOSPHATE 500 ML: .53; .5; .37; .037; .03; .012; .00082 INJECTION, SOLUTION INTRAVENOUS at 10:39

## 2020-01-01 RX ADMIN — HYDRALAZINE HYDROCHLORIDE 5 MG: 20 INJECTION, SOLUTION INTRAMUSCULAR; INTRAVENOUS at 20:37

## 2020-01-01 RX ADMIN — HEPARIN SODIUM 5000 UNITS: 5000 INJECTION INTRAVENOUS; SUBCUTANEOUS at 14:39

## 2020-01-01 RX ADMIN — HEPARIN SODIUM 5000 UNITS: 5000 INJECTION INTRAVENOUS; SUBCUTANEOUS at 23:01

## 2020-01-01 RX ADMIN — FLUTICASONE PROPIONATE 1 SPRAY: 50 SPRAY, METERED NASAL at 09:06

## 2020-01-01 RX ADMIN — LOSARTAN POTASSIUM 50 MG: 50 TABLET, FILM COATED ORAL at 09:04

## 2020-01-01 RX ADMIN — NITROGLYCERIN 1 INCH: 20 OINTMENT TOPICAL at 18:01

## 2020-01-01 RX ADMIN — DEXTROSE MONOHYDRATE 50 ML: 25 INJECTION, SOLUTION INTRAVENOUS at 04:12

## 2020-01-01 RX ADMIN — HEPARIN SODIUM 5000 UNITS: 5000 INJECTION INTRAVENOUS; SUBCUTANEOUS at 21:39

## 2020-01-01 RX ADMIN — ATORVASTATIN CALCIUM 20 MG: 20 TABLET, FILM COATED ORAL at 16:08

## 2020-01-01 RX ADMIN — CHLORHEXIDINE GLUCONATE 15 ML: 1.2 RINSE ORAL at 10:44

## 2020-01-01 RX ADMIN — MAGNESIUM OXIDE TAB 400 MG (241.3 MG ELEMENTAL MG) 400 MG: 400 (241.3 MG) TAB at 08:49

## 2020-01-01 RX ADMIN — NITROGLYCERIN 1 INCH: 20 OINTMENT TOPICAL at 12:21

## 2020-01-01 RX ADMIN — DIGOXIN 100 MCG: 250 INJECTION, SOLUTION INTRAMUSCULAR; INTRAVENOUS; PARENTERAL at 08:58

## 2020-01-01 RX ADMIN — PROPOFOL 40 MG: 10 INJECTION, EMULSION INTRAVENOUS at 13:28

## 2020-01-01 RX ADMIN — METOPROLOL TARTRATE 25 MG: 50 TABLET, FILM COATED ORAL at 09:27

## 2020-01-01 RX ADMIN — METOROPROLOL TARTRATE 5 MG: 5 INJECTION, SOLUTION INTRAVENOUS at 05:48

## 2020-01-01 RX ADMIN — ISOSORBIDE MONONITRATE 30 MG: 30 TABLET, EXTENDED RELEASE ORAL at 08:57

## 2020-01-01 RX ADMIN — MIRTAZAPINE 7.5 MG: 15 TABLET, FILM COATED ORAL at 00:45

## 2020-01-01 RX ADMIN — DEXTROSE MONOHYDRATE 50 ML: 25 INJECTION, SOLUTION INTRAVENOUS at 14:07

## 2020-01-01 RX ADMIN — LEVALBUTEROL HYDROCHLORIDE 1.25 MG: 1.25 SOLUTION, CONCENTRATE RESPIRATORY (INHALATION) at 19:10

## 2020-01-01 RX ADMIN — METOROPROLOL TARTRATE 5 MG: 5 INJECTION, SOLUTION INTRAVENOUS at 18:01

## 2020-01-01 RX ADMIN — ACETAMINOPHEN 650 MG: 650 SUPPOSITORY RECTAL at 12:59

## 2020-01-01 RX ADMIN — DIGOXIN 125 MCG: 125 TABLET ORAL at 16:44

## 2020-01-01 RX ADMIN — NITROGLYCERIN 1 INCH: 20 OINTMENT TOPICAL at 08:55

## 2020-01-01 RX ADMIN — POTASSIUM CHLORIDE 20 MEQ: 14.9 INJECTION, SOLUTION INTRAVENOUS at 09:40

## 2020-01-01 RX ADMIN — ROPINIROLE HYDROCHLORIDE 0.5 MG: 0.25 TABLET, FILM COATED ORAL at 21:39

## 2020-01-01 RX ADMIN — PROPOFOL 10 MG: 10 INJECTION, EMULSION INTRAVENOUS at 10:54

## 2020-01-01 RX ADMIN — LIDOCAINE HYDROCHLORIDE 30 MG: 10 INJECTION, SOLUTION EPIDURAL; INFILTRATION; INTRACAUDAL; PERINEURAL at 10:50

## 2020-01-01 RX ADMIN — POTASSIUM CHLORIDE: 14.9 INJECTION, SOLUTION INTRAVENOUS at 07:51

## 2020-01-01 RX ADMIN — FUROSEMIDE 40 MG: 40 TABLET ORAL at 08:59

## 2020-01-01 RX ADMIN — PROPOFOL 10 MCG/KG/MIN: 10 INJECTION, EMULSION INTRAVENOUS at 08:18

## 2020-01-01 RX ADMIN — QUETIAPINE FUMARATE 12.5 MG: 25 TABLET ORAL at 21:27

## 2020-01-01 RX ADMIN — QUETIAPINE FUMARATE 12.5 MG: 25 TABLET ORAL at 21:39

## 2020-01-01 RX ADMIN — PHENYLEPHRINE HYDROCHLORIDE 200 MCG: 10 INJECTION INTRAVENOUS at 07:07

## 2020-01-01 RX ADMIN — SODIUM CHLORIDE 75 ML/HR: 0.9 INJECTION, SOLUTION INTRAVENOUS at 00:47

## 2020-01-01 RX ADMIN — HYDROCORTISONE SODIUM SUCCINATE 50 MG: 100 INJECTION, POWDER, FOR SOLUTION INTRAMUSCULAR; INTRAVENOUS at 15:57

## 2020-01-01 RX ADMIN — IPRATROPIUM BROMIDE 1 PUFF: 17 AEROSOL, METERED RESPIRATORY (INHALATION) at 22:30

## 2020-01-01 RX ADMIN — SODIUM CHLORIDE 500 ML: 0.9 INJECTION, SOLUTION INTRAVENOUS at 21:49

## 2020-01-01 RX ADMIN — DEXTROSE MONOHYDRATE 50 ML: 500 INJECTION PARENTERAL at 04:12

## 2020-01-01 RX ADMIN — PANTOPRAZOLE SODIUM 40 MG: 40 INJECTION, POWDER, FOR SOLUTION INTRAVENOUS at 10:43

## 2020-01-01 RX ADMIN — SODIUM CHLORIDE, SODIUM GLUCONATE, SODIUM ACETATE, POTASSIUM CHLORIDE, MAGNESIUM CHLORIDE, SODIUM PHOSPHATE, DIBASIC, AND POTASSIUM PHOSPHATE 125 ML/HR: .53; .5; .37; .037; .03; .012; .00082 INJECTION, SOLUTION INTRAVENOUS at 09:19

## 2020-01-01 RX ADMIN — DEXTROSE 100 ML/HR: 5 SOLUTION INTRAVENOUS at 23:34

## 2020-01-01 RX ADMIN — SODIUM CHLORIDE, SODIUM GLUCONATE, SODIUM ACETATE, POTASSIUM CHLORIDE, MAGNESIUM CHLORIDE, SODIUM PHOSPHATE, DIBASIC, AND POTASSIUM PHOSPHATE 1000 ML: .53; .5; .37; .037; .03; .012; .00082 INJECTION, SOLUTION INTRAVENOUS at 02:45

## 2020-01-01 RX ADMIN — IPRATROPIUM BROMIDE 1 PUFF: 17 AEROSOL, METERED RESPIRATORY (INHALATION) at 00:44

## 2020-01-01 RX ADMIN — ALBUMIN (HUMAN): 12.5 INJECTION, SOLUTION INTRAVENOUS at 07:18

## 2020-01-01 RX ADMIN — HYDROMORPHONE HYDROCHLORIDE 0.2 MG: 1 INJECTION, SOLUTION INTRAMUSCULAR; INTRAVENOUS; SUBCUTANEOUS at 00:46

## 2020-01-01 RX ADMIN — HYDROMORPHONE HYDROCHLORIDE 0.2 MG: 1 INJECTION, SOLUTION INTRAMUSCULAR; INTRAVENOUS; SUBCUTANEOUS at 03:51

## 2020-01-01 RX ADMIN — DEXTROSE MONOHYDRATE 50 ML: 500 INJECTION PARENTERAL at 18:09

## 2020-01-01 RX ADMIN — PANTOPRAZOLE SODIUM 40 MG: 40 INJECTION, POWDER, FOR SOLUTION INTRAVENOUS at 22:55

## 2020-01-01 RX ADMIN — NITROGLYCERIN 1 INCH: 20 OINTMENT TOPICAL at 09:20

## 2020-01-01 RX ADMIN — CALCIUM CHLORIDE 0.25 G: 100 INJECTION INTRAVENOUS; INTRAVENTRICULAR at 07:38

## 2020-01-01 RX ADMIN — CEFAZOLIN SODIUM 1000 MG: 1 SOLUTION INTRAVENOUS at 07:05

## 2020-01-01 RX ADMIN — METOPROLOL TARTRATE 25 MG: 25 TABLET, FILM COATED ORAL at 08:49

## 2020-01-01 RX ADMIN — POTASSIUM CHLORIDE 20 MEQ: 14.9 INJECTION, SOLUTION INTRAVENOUS at 10:43

## 2020-01-01 RX ADMIN — GLYCOPYRROLATE 0.2 MG: 0.2 INJECTION, SOLUTION INTRAMUSCULAR; INTRAVENOUS at 20:44

## 2020-01-01 RX ADMIN — PHENYLEPHRINE HYDROCHLORIDE 200 MCG: 10 INJECTION INTRAVENOUS at 07:32

## 2020-01-01 RX ADMIN — SODIUM CHLORIDE, SODIUM GLUCONATE, SODIUM ACETATE, POTASSIUM CHLORIDE, MAGNESIUM CHLORIDE, SODIUM PHOSPHATE, DIBASIC, AND POTASSIUM PHOSPHATE 100 ML/HR: .53; .5; .37; .037; .03; .012; .00082 INJECTION, SOLUTION INTRAVENOUS at 20:20

## 2020-01-01 RX ADMIN — SODIUM CHLORIDE: 0.9 INJECTION, SOLUTION INTRAVENOUS at 11:50

## 2020-01-01 RX ADMIN — MEMANTINE 10 MG: 10 TABLET ORAL at 08:58

## 2020-01-01 RX ADMIN — METOROPROLOL TARTRATE 5 MG: 5 INJECTION, SOLUTION INTRAVENOUS at 12:17

## 2020-01-01 RX ADMIN — MEMANTINE 10 MG: 10 TABLET ORAL at 09:04

## 2020-01-01 RX ADMIN — METOPROLOL TARTRATE 25 MG: 50 TABLET, FILM COATED ORAL at 09:03

## 2020-01-01 RX ADMIN — METOROPROLOL TARTRATE 5 MG: 5 INJECTION, SOLUTION INTRAVENOUS at 13:00

## 2020-01-01 RX ADMIN — SODIUM CHLORIDE: 0.9 INJECTION, SOLUTION INTRAVENOUS at 06:32

## 2020-01-01 RX ADMIN — METOROPROLOL TARTRATE 5 MG: 5 INJECTION, SOLUTION INTRAVENOUS at 05:31

## 2020-01-01 RX ADMIN — PHENYLEPHRINE HYDROCHLORIDE 200 MCG: 10 INJECTION INTRAVENOUS at 07:21

## 2020-01-01 RX ADMIN — SODIUM CHLORIDE, SODIUM GLUCONATE, SODIUM ACETATE, POTASSIUM CHLORIDE, MAGNESIUM CHLORIDE, SODIUM PHOSPHATE, DIBASIC, AND POTASSIUM PHOSPHATE 100 ML/HR: .53; .5; .37; .037; .03; .012; .00082 INJECTION, SOLUTION INTRAVENOUS at 03:53

## 2020-01-01 RX ADMIN — FLUTICASONE PROPIONATE 1 SPRAY: 50 SPRAY, METERED NASAL at 09:05

## 2020-01-01 RX ADMIN — DONEPEZIL HYDROCHLORIDE 10 MG: 10 TABLET ORAL at 21:27

## 2020-01-01 RX ADMIN — ALBUMIN (HUMAN): 12.5 INJECTION, SOLUTION INTRAVENOUS at 07:07

## 2020-01-01 RX ADMIN — POTASSIUM CHLORIDE 20 MEQ: 14.9 INJECTION, SOLUTION INTRAVENOUS at 15:00

## 2020-01-01 RX ADMIN — METOPROLOL TARTRATE 25 MG: 25 TABLET, FILM COATED ORAL at 21:27

## 2020-01-01 RX ADMIN — ATORVASTATIN CALCIUM 20 MG: 40 TABLET, FILM COATED ORAL at 09:03

## 2020-01-01 RX ADMIN — LOSARTAN POTASSIUM 50 MG: 50 TABLET, FILM COATED ORAL at 08:59

## 2020-01-01 RX ADMIN — PROPOFOL 20 MG: 10 INJECTION, EMULSION INTRAVENOUS at 10:50

## 2020-01-01 RX ADMIN — LIDOCAINE HYDROCHLORIDE 50 MG: 10 INJECTION, SOLUTION EPIDURAL; INFILTRATION; INTRACAUDAL; PERINEURAL at 13:25

## 2020-01-01 RX ADMIN — FUROSEMIDE 40 MG: 40 TABLET ORAL at 09:04

## 2020-01-01 RX ADMIN — PANTOPRAZOLE SODIUM 40 MG: 40 TABLET, DELAYED RELEASE ORAL at 06:15

## 2020-01-09 NOTE — ED PROVIDER NOTES
History  Chief Complaint   Patient presents with    Dizziness     Pt reports several day hx of "cold", reports 30 minutes episode of dizziness this afternoon with some associated SOB  Denies chest pain  79 yo female with h/o , Afib on a/c, GERD, HTN, HLD, MR presents with cough and episode of dizziness that lasted 5 minutes  Reports several days of cold symptoms, cough congestion  Denies fever, cp or SOB  States she feels generally well and that her  made her come in for a chest xray  History provided by:  Spouse, patient and medical records   used: No    Dizziness   Quality:  Lightheadedness  Severity:  Mild  Onset quality:  Gradual  Timing:  Rare  Progression:  Resolved  Chronicity:  New  Context: physical activity    Context: not when bending over, not with bowel movement, not with ear pain, not with eye movement, not with head movement, not with loss of consciousness, not when standing up and not when urinating    Relieved by:  Nothing  Worsened by:  Nothing  Ineffective treatments:  None tried  Associated symptoms: no blood in stool, no chest pain, no diarrhea, no headaches, no hearing loss, no nausea, no palpitations, no shortness of breath, no syncope, no tinnitus, no vision changes, no vomiting and no weakness    Risk factors: heart disease and multiple medications    Risk factors: no anemia, no hx of vertigo and no new medications        Prior to Admission Medications   Prescriptions Last Dose Informant Patient Reported? Taking?    Coenzyme Q10 100 MG TABS  Self Yes No   Sig: Take 100 mg by mouth daily    Multiple Vitamins-Minerals (PRESERVISION AREDS 2+MULTI VIT PO)  Self Yes No   Sig: Take 1 capsule by mouth 2 (two) times a day   Specialty Vitamins Products (MAGNESIUM, AMINO ACID CHELATE,) 133 MG tablet  Self Yes No   Sig: Take 1 tablet by mouth daily   acetaminophen (TYLENOL) 325 mg tablet  Self Yes No   Sig: Take 650 mg by mouth every 6 (six) hours as needed for mild pain   apixaban (ELIQUIS) 2 5 mg  Self Yes No   Sig: Take 2 5 mg by mouth 2 (two) times a day   atorvastatin (LIPITOR) 20 mg tablet  Self Yes No   Sig: Take 20 mg by mouth daily   digoxin (LANOXIN) 0 125 mg tablet  Self Yes No   Sig: Take 125 mcg by mouth daily   donepezil (ARICEPT) 10 mg tablet  Self Yes No   Sig: Take 10 mg by mouth daily at bedtime   fluticasone (FLONASE) 50 mcg/act nasal spray  Self Yes No   Si spray into each nostril daily   furosemide (LASIX) 40 mg tablet  Self Yes No   Sig: Take 40 mg by mouth daily   ipratropium (ATROVENT) 0 06 % nasal spray  Self Yes No   ipratropium-albuterol (COMBIVENT)  mcg/act inhaler  Self Yes No   Sig: Inhale 1 puff 2 (two) times a day   isosorbide dinitrate (ISORDIL) 30 mg tablet  Self Yes No   Sig: Take 30 mg by mouth daily   isosorbide mononitrate (IMDUR) 30 mg 24 hr tablet  Self Yes No   Sig: Take 30 mg by mouth daily   losartan (COZAAR) 25 mg tablet  Self Yes No   Sig: Take 25 mg by mouth daily   magnesium Oxide (MAG-OX) 400 mg TABS  Self Yes No   Si mg daily    memantine (NAMENDA) 10 mg tablet  Self Yes No   Sig: Take 10 mg by mouth daily   metoprolol tartrate (LOPRESSOR) 25 mg tablet  Self Yes No   Sig: Take 25 mg by mouth every morning   metoprolol tartrate (LOPRESSOR) 50 mg tablet  Self Yes No   Sig: Take 50 mg by mouth every evening   mirtazapine (REMERON) 7 5 MG tablet  Self Yes No   Sig: Take 7 5 mg by mouth daily at bedtime   pantoprazole (PROTONIX) 40 mg tablet  Self Yes No   Sig: Take 40 mg by mouth daily   predniSONE 10 mg tablet  Self No No   Si mg PO x 2 days then decrease by 10 mg every 2 days until finished   rOPINIRole (REQUIP) 0 5 mg tablet  Self No No   Sig: take 1 tablet by mouth at bedtime for 2 weeks then INCREASE TO 2 TABLETS FOR RESTLESS LEGS   traMADol (ULTRAM) 50 mg tablet  Self No No   Sig: Take 1 tablet (50 mg total) by mouth every 6 (six) hours as needed for severe pain      Facility-Administered Medications: None       Past Medical History:   Diagnosis Date    Arrhythmia     Asthma     Atrial fibrillation ([address] )     Cardiac disease     COPD (chronic obstructive pulmonary disease) (HCC)     Dementia (HCC)     Depression     GERD (gastroesophageal reflux disease)     History of heart artery stent     Hyperlipidemia     Hypertension        Past Surgical History:   Procedure Laterality Date    CORONARY ANGIOPLASTY WITH STENT PLACEMENT         Family History   Problem Relation Age of Onset    Cancer Sister     Cancer Brother      I have reviewed and agree with the history as documented  Social History     Tobacco Use    Smoking status: Never Smoker    Smokeless tobacco: Never Used   Substance Use Topics    Alcohol use: No    Drug use: No        Review of Systems   HENT: Negative for hearing loss and tinnitus  Respiratory: Negative for shortness of breath  Cardiovascular: Negative for chest pain, palpitations and syncope  Gastrointestinal: Negative for blood in stool, diarrhea, nausea and vomiting  Neurological: Positive for dizziness  Negative for weakness and headaches  All other systems reviewed and are negative  Physical Exam  Physical Exam   Constitutional: She is oriented to person, place, and time  She appears well-developed and well-nourished  HENT:   Head: Normocephalic and atraumatic  Eyes: Conjunctivae are normal    Neck: Normal range of motion  Cardiovascular: Normal rate, regular rhythm, normal heart sounds and intact distal pulses  No murmur heard  Pulmonary/Chest: Effort normal and breath sounds normal  No respiratory distress  Abdominal: Soft  Musculoskeletal: Normal range of motion  She exhibits no deformity  Neurological: She is alert and oriented to person, place, and time  Skin: Skin is warm and dry  She is not diaphoretic  Psychiatric: She has a normal mood and affect   Her behavior is normal  Judgment and thought content normal  Nursing note and vitals reviewed  Vital Signs  ED Triage Vitals [01/09/20 1728]   Temperature Pulse Respirations Blood Pressure SpO2   98 2 °F (36 8 °C) 100 14 153/78 95 %      Temp Source Heart Rate Source Patient Position - Orthostatic VS BP Location FiO2 (%)   Oral -- -- -- --      Pain Score       No Pain           Vitals:    01/09/20 1728   BP: 153/78   Pulse: 100         Visual Acuity      ED Medications  Medications - No data to display    Diagnostic Studies  Results Reviewed     None                 No orders to display              Procedures  ECG 12 Lead Documentation Only  Date/Time: 1/9/2020 8:09 PM  Performed by: Darya Pablo MD  Authorized by: Darya Pablo MD     Indications / Diagnosis:  Dizziness  ECG reviewed by me, the ED Provider: yes    Patient location:  ED  Previous ECG:     Previous ECG:  Compared to current    Comparison ECG info:  5/27/2019    Similarity:  Changes noted    Comparison to cardiac monitor: Yes    Interpretation:     Interpretation: abnormal    Rate:     ECG rate:  94 BPM    ECG rate assessment: normal    Rhythm:     Rhythm: atrial fibrillation    Ectopy:     Ectopy: none    QRS:     QRS axis:  Normal  ST segments:     ST segments:  Non-specific  Comments:      No acute ischemia, no STEMI             ED Course  ED Course as of Matthieu 15 0850   Thu Jan 09, 2020 2021 Pt well appearing, dizziness resolved, has no acute complaint other than cough  EKG without acute ischemia  Pt without significant vol overload on exam or signs of acute bacterial infection  Will discharge to home with close PCP follow up                                      MDM  Number of Diagnoses or Management Options  Cough:   Dizziness:      Amount and/or Complexity of Data Reviewed  Clinical lab tests: ordered and reviewed  Tests in the radiology section of CPT®: ordered and reviewed  Tests in the medicine section of CPT®: ordered and reviewed  Obtain history from someone other than the patient: yes  Review and summarize past medical records: yes  Discuss the patient with other providers: yes  Independent visualization of images, tracings, or specimens: yes    Risk of Complications, Morbidity, and/or Mortality  Presenting problems: moderate  Diagnostic procedures: moderate  Management options: moderate    Patient Progress  Patient progress: improved        Disposition  Final diagnoses:   None     ED Disposition     None      Follow-up Information    None         Patient's Medications   Discharge Prescriptions    No medications on file     No discharge procedures on file      ED Provider  Electronically Signed by           Shoshana Leigh MD  01/15/20 0659

## 2020-01-10 NOTE — DISCHARGE INSTRUCTIONS
Your exam and testing was reassuring  Please return to the ER if you have worsening symptoms or any new concerns, otherwise please follow up with your primary care doctor on Monday

## 2020-06-26 NOTE — PROGRESS NOTES
Virtual Regular Visit      Assessment/Plan:    Problem List Items Addressed This Visit        Other    Hyperlipidemia      Other Visit Diagnoses     Essential (primary) hypertension    -  Primary    Relevant Medications    losartan (COZAAR) 50 mg tablet    Atrial fibrillation, unspecified type ([facility] [address] )        Non-rheumatic mitral regurgitation                   Reason for visit is   Chief Complaint   Patient presents with    Virtual Regular Visit     no cardiac complaints        Encounter provider Ana Allan MD    Provider located at [address] [place] [address]      Recent Visits  No visits were found meeting these conditions  Showing recent visits within past 7 days and meeting all other requirements     Today's Visits  Date Type Provider Dept   07/01/20 Telemedicine Ana Allan MD [id number] today's visits and meeting all other requirements     Future Appointments  No visits were found meeting these conditions  Showing future appointments within next 150 days and meeting all other requirements        The patient was identified by name and date of birth  [place] was informed that this is a telemedicine visit and that the visit is being conducted through [facility] and patient was informed that this is a secure, HIPAA-compliant platform  She agrees to proceed     My office door was closed  No one else was in the room  She acknowledged consent and understanding of privacy and security of the video platform  The patient has agreed to participate and understands they can discontinue the visit at any time  Patient is aware this is a billable service  Jody Kim is a 80 y o  female Patient is here for a follow-up visit  Her most recent echocardiogram was done March 2017 and demonstrated preserved LV systolic function with mild LVH    She was noted to have mild mitral regurgitation with mild aortic valve stenosis and moderate aortic regurgitation  Severe tricuspid regurgitation was noted  Patient has chronic atrial fibrillation on adjusted dose Eliquis  Patient had a lipid profile done June 2019 which demonstrated a total cholesterol of 135 with an HDL of 55 and a direct LDL of 62    patient has been well  She has had no chest pain or significant dyspnea  A caregiver is assisting with the video visit today  She was having some issue with abdominal discomfort and went for an ultrasound in the result is pending  In reference to her cardiac status she is stable and I will see her in follow-up in six months time    HPI     Past Medical History:   Diagnosis Date    Arrhythmia     Asthma     Atrial fibrillation ([address] )     Cardiac disease     [facility]) [facility])     Dementia ([facility])     Depression     GERD (gastroesophageal reflux disease)     History of heart artery stent     Hyperlipidemia     Hypertension        Past Surgical History:   Procedure Laterality Date    CORONARY ANGIOPLASTY WITH STENT PLACEMENT         Current Outpatient Medications   Medication Sig Dispense Refill    acetaminophen (TYLENOL) 325 mg tablet Take 650 mg by mouth every 6 (six) hours as needed for mild pain      apixaban (ELIQUIS) 2 5 mg Take 2 5 mg by mouth 2 (two) times a day      atorvastatin (LIPITOR) 20 mg tablet Take 20 mg by mouth daily      Coenzyme Q10 100 MG TABS Take 100 mg by mouth daily       digoxin (LANOXIN) 0 125 mg tablet Take 125 mcg by mouth daily      donepezil (ARICEPT) 10 mg tablet Take 10 mg by mouth daily at bedtime      fluticasone (FLONASE) 50 mcg/act nasal spray 1 spray into each nostril daily      furosemide (LASIX) 40 mg tablet Take 40 mg by mouth 2 (two) times a day   0    ipratropium (ATROVENT) 0 03 % nasal spray       ipratropium (ATROVENT) 0 06 % nasal spray   1    ipratropium-albuterol (COMBIVENT)  mcg/act inhaler Inhale 1 puff 2 (two) times a day      isosorbide dinitrate (ISORDIL) 30 mg tablet Take 30 mg by mouth daily      isosorbide mononitrate (IMDUR) 30 mg 24 hr tablet Take 30 mg by mouth daily      losartan (COZAAR) 50 mg tablet Take 50 mg by mouth daily      magnesium oxide (MAG-OX) 400 mg tablet Take 1 tablet by mouth daily      memantine (NAMENDA) 10 mg tablet Take 10 mg by mouth daily  0    metoprolol tartrate (LOPRESSOR) 25 mg tablet Take 25 mg by mouth every morning      metoprolol tartrate (LOPRESSOR) 50 mg tablet Take 50 mg by mouth every evening      mirtazapine (REMERON) 7 5 MG tablet Take 7 5 mg by mouth daily at bedtime      Multiple Vitamins-Minerals (PRESERVISION AREDS 2+MULTI VIT PO) Take 1 capsule by mouth 2 (two) times a day      pantoprazole (PROTONIX) 40 mg tablet Take 40 mg by mouth daily      predniSONE 10 mg tablet 50 mg PO x 2 days then decrease by 10 mg every 2 days until finished 30 tablet 0    rOPINIRole (REQUIP) 0 5 mg tablet take 1 tablet by mouth at bedtime for 2 weeks then INCREASE TO 2 TABLETS FOR RESTLESS LEGS 60 tablet 2    Specialty Vitamins Products (MAGNESIUM, AMINO ACID CHELATE,) 133 MG tablet Take 1 tablet by mouth daily      traMADol (ULTRAM) 50 mg tablet Take 1 tablet (50 mg total) by mouth every 6 (six) hours as needed for severe pain 15 tablet 0     No current facility-administered medications for this visit  Allergies   Allergen Reactions    Asa [Aspirin]     Latex     Omnipaque [Iohexol]     Sulfa Antibiotics     Thiazide-Type Diuretics        Review of Systems   All other systems reviewed and are negative  Video Exam    Vitals:    07/01/20 1512   Weight: 47 2 kg (104 lb)   Height: 5' 1" (1 549 m)       Physical Exam   Constitutional: She is oriented to person, place, and time  She appears well-developed and well-nourished  HENT:   Head: Normocephalic     Pulmonary/Chest: Effort normal    Neurological: She is alert and oriented to person, place, and time  Psychiatric: She has a normal mood and affect  As a result of this visit, I have not referred the patient for further respiratory evaluation  I spent 20 minutes directly with the patient during this visit      [address] acknowledges that she has consented to an online visit or consultation  She understands that the online visit is based solely on information provided by her, and that, in the absence of a face-to-face physical evaluation by the physician, the diagnosis she receives is both limited and provisional in terms of accuracy and completeness  This is not intended to replace a full medical face-to-face evaluation by the physician  Emelyn Nicole understands and accepts these terms

## 2020-12-08 PROBLEM — J44.9 COPD (CHRONIC OBSTRUCTIVE PULMONARY DISEASE) (HCC): Status: ACTIVE | Noted: 2017-12-31

## 2020-12-10 PROBLEM — R65.10 SIRS (SYSTEMIC INFLAMMATORY RESPONSE SYNDROME) (HCC): Status: ACTIVE | Noted: 2020-01-01

## 2020-12-10 PROBLEM — R13.10 DYSPHAGIA: Status: ACTIVE | Noted: 2020-01-01

## 2020-12-10 PROBLEM — E46 PROTEIN-CALORIE MALNUTRITION (HCC): Status: ACTIVE | Noted: 2020-01-01

## 2020-12-16 PROBLEM — N18.9 CHRONIC KIDNEY DISEASE: Status: ACTIVE | Noted: 2020-01-01

## 2020-12-21 PROBLEM — R41.82 ALTERED MENTAL STATUS: Status: ACTIVE | Noted: 2020-01-01

## 2020-12-21 PROBLEM — E87.0 HYPERNATREMIA: Status: ACTIVE | Noted: 2020-01-01

## 2020-12-21 PROBLEM — I21.4 NSTEMI (NON-ST ELEVATED MYOCARDIAL INFARCTION) (HCC): Status: ACTIVE | Noted: 2020-01-01

## 2020-12-22 PROBLEM — N17.9 AKI (ACUTE KIDNEY INJURY) (HCC): Status: ACTIVE | Noted: 2020-01-01

## 2020-12-22 PROBLEM — E87.6 HYPOKALEMIA: Status: ACTIVE | Noted: 2020-01-01

## 2020-12-22 PROBLEM — R19.7 DIARRHEA: Status: ACTIVE | Noted: 2020-01-01

## 2020-12-23 PROBLEM — R77.8 TROPONIN LEVEL ELEVATED: Status: ACTIVE | Noted: 2020-01-01

## 2020-12-24 PROBLEM — D72.829 LEUKOCYTOSIS: Status: ACTIVE | Noted: 2020-01-01

## 2020-12-24 PROBLEM — E43 SEVERE PROTEIN-CALORIE MALNUTRITION (HCC): Status: ACTIVE | Noted: 2020-01-01

## 2020-12-26 PROBLEM — N17.9 SEPSIS WITH ACUTE RENAL FAILURE AND SEPTIC SHOCK (HCC): Status: ACTIVE | Noted: 2018-01-01

## 2020-12-26 PROBLEM — R65.21 SEPSIS WITH ACUTE RENAL FAILURE AND SEPTIC SHOCK (HCC): Status: ACTIVE | Noted: 2018-01-01

## 2020-12-26 PROBLEM — J96.01 ACUTE RESPIRATORY FAILURE WITH HYPOXIA (HCC): Status: ACTIVE | Noted: 2020-01-01

## 2020-12-29 LAB
BACTERIA BLD CULT: ABNORMAL
BACTERIA BLD CULT: NORMAL
BACTERIA BLD CULT: NORMAL
GRAM STN SPEC: ABNORMAL

## 2021-01-02 LAB
BACTERIA BLD CULT: ABNORMAL
GRAM STN SPEC: ABNORMAL

## 2021-08-18 NOTE — PATIENT INSTRUCTIONS
I will continue the patient's present medical regimen  The patient appears well compensated  I have asked the patient to call if there is a problem in the interim otherwise I will see the patient in six months time  Oculoplastic Surgeon Procedure Text (A): After obtaining clear surgical margins the patient was sent to oculoplastics for surgical repair.  The patient understands they will receive post-surgical care and follow-up from the referring physician's office.

## (undated) DEVICE — GLOVE SRG LF STRL BGL SKNSNS 5.5 PF

## (undated) DEVICE — TROCAR: Brand: KII FIOS FIRST ENTRY

## (undated) DEVICE — CHLORAPREP HI-LITE 26ML ORANGE

## (undated) DEVICE — TRAY FOLEY 16FR URIMETER SILICONE SURESTEP

## (undated) DEVICE — ENSEAL 20 CM SHAFT, LARGE JAW: Brand: ENSEAL X1

## (undated) DEVICE — GLOVE SRG LF STRL BGL SKNSNS 6.5 PF

## (undated) DEVICE — 3000CC GUARDIAN II: Brand: GUARDIAN

## (undated) DEVICE — GLOVE INDICATOR UNDERGLOVE SZ 6 BLUE

## (undated) DEVICE — INTENDED FOR TISSUE SEPARATION, AND OTHER PROCEDURES THAT REQUIRE A SHARP SURGICAL BLADE TO PUNCTURE OR CUT.: Brand: BARD-PARKER SAFETY BLADES SIZE 11, STERILE

## (undated) DEVICE — INSUFLATION TUBING INSUFLOW (LEXION)

## (undated) DEVICE — PROXIMATE LINEAR CUTTER RELOAD, BLUE, 75MM: Brand: PROXIMATE

## (undated) DEVICE — ENDOPATH PNEUMONEEDLE INSUFFLATION NEEDLES WITH LUER LOCK CONNECTORS 120MM: Brand: ENDOPATH

## (undated) DEVICE — GLOVE INDICATOR PI UNDERGLOVE SZ 6.5 BLUE

## (undated) DEVICE — VAC CANISTER 500ML

## (undated) DEVICE — PROXIMATE RELOADABLE LINEAR CUTTER WITH SAFETY LOCK-OUT, 75MM: Brand: PROXIMATE

## (undated) DEVICE — ABTHERA OPEN ABDOMEN DRESSING WITH SENSATRAC PAD: Brand: ABTHERA™ SENSAT.R.A.C.™